# Patient Record
Sex: FEMALE | Race: BLACK OR AFRICAN AMERICAN | NOT HISPANIC OR LATINO | Employment: OTHER | URBAN - METROPOLITAN AREA
[De-identification: names, ages, dates, MRNs, and addresses within clinical notes are randomized per-mention and may not be internally consistent; named-entity substitution may affect disease eponyms.]

---

## 2017-06-28 ENCOUNTER — HOSPITAL ENCOUNTER (OUTPATIENT)
Dept: MEDSURG UNIT | Facility: HOSPITAL | Age: 27
End: 2017-06-30
Attending: INTERNAL MEDICINE | Admitting: INTERNAL MEDICINE

## 2017-06-28 LAB
ABS NEUT (OLG): 13.24 X10(3)/MCL (ref 2.1–9.2)
ALBUMIN SERPL-MCNC: 4.4 GM/DL (ref 3.4–5)
ALBUMIN/GLOB SERPL: 1 RATIO (ref 1–2)
ALP SERPL-CCNC: 66 UNIT/L (ref 45–117)
ALT SERPL-CCNC: 19 UNIT/L (ref 12–78)
AMPHET UR QL SCN: NEGATIVE
APPEARANCE, UA: CLEAR
AST SERPL-CCNC: 23 UNIT/L (ref 15–37)
BACTERIA #/AREA URNS AUTO: ABNORMAL /[HPF]
BARBITURATE SCN PRESENT UR: NEGATIVE
BASOPHILS # BLD AUTO: 0.15 X10(3)/MCL
BASOPHILS NFR BLD AUTO: 1 % (ref 0–1)
BENZODIAZ UR QL SCN: NEGATIVE
BILIRUB SERPL-MCNC: 1.6 MG/DL (ref 0.2–1)
BILIRUB UR QL STRIP: NEGATIVE
BILIRUBIN DIRECT+TOT PNL SERPL-MCNC: 0.3 MG/DL
BILIRUBIN DIRECT+TOT PNL SERPL-MCNC: 1.3 MG/DL
BUN SERPL-MCNC: 8 MG/DL (ref 7–18)
CALCIUM SERPL-MCNC: 9.2 MG/DL (ref 8.5–10.1)
CANNABINOIDS UR QL SCN: NEGATIVE
CHLORIDE SERPL-SCNC: 109 MMOL/L (ref 98–107)
CO2 SERPL-SCNC: 24 MMOL/L (ref 21–32)
COCAINE UR QL SCN: NEGATIVE
COLOR UR: YELLOW
CREAT SERPL-MCNC: 0.7 MG/DL (ref 0.6–1.3)
EOSINOPHIL # BLD AUTO: 0.08 X10(3)/MCL
EOSINOPHIL NFR BLD AUTO: 0 % (ref 0–5)
ERYTHROCYTE [DISTWIDTH] IN BLOOD BY AUTOMATED COUNT: 16.4 % (ref 11.5–14.5)
GLOBULIN SER-MCNC: 4.4 GM/ML (ref 2.3–3.5)
GLUCOSE (UA): NORMAL
GLUCOSE SERPL-MCNC: 118 MG/DL (ref 74–106)
HCT VFR BLD AUTO: 28.2 % (ref 35–46)
HGB BLD-MCNC: 9.3 GM/DL (ref 12–16)
HGB UR QL STRIP: >1 MG/DL
HYALINE CASTS #/AREA URNS LPF: ABNORMAL /[LPF]
IMM GRANULOCYTES # BLD AUTO: 0.06 10*3/UL
IMM GRANULOCYTES NFR BLD AUTO: 0 %
KETONES UR QL STRIP: NEGATIVE
LEUKOCYTE ESTERASE UR QL STRIP: 75 LEU/UL
LYMPHOCYTES # BLD AUTO: 4.67 X10(3)/MCL
LYMPHOCYTES NFR BLD AUTO: 24 % (ref 15–40)
MCH RBC QN AUTO: 29.2 PG (ref 26–34)
MCHC RBC AUTO-ENTMCNC: 33 GM/DL (ref 31–37)
MCV RBC AUTO: 88.4 FL (ref 80–100)
MONOCYTES # BLD AUTO: 1.24 X10(3)/MCL
MONOCYTES NFR BLD AUTO: 6 % (ref 4–12)
NEUTROPHILS # BLD AUTO: 13.24 X10(3)/MCL
NEUTROPHILS NFR BLD AUTO: 68 X10(3)/MCL
NITRITE UR QL STRIP: NEGATIVE
OPIATES UR QL SCN: POSITIVE
PCP UR QL: NEGATIVE
PH UR STRIP: 5.5 [PH] (ref 4.5–8)
PLATELET # BLD AUTO: 627 X10(3)/MCL (ref 130–400)
PMV BLD AUTO: 10.5 FL (ref 7.4–10.4)
POC BETA-HCG (QUAL): NEGATIVE
POTASSIUM SERPL-SCNC: 3.5 MMOL/L (ref 3.5–5.1)
PROT SERPL-MCNC: 8.8 GM/DL (ref 6.4–8.2)
PROT UR QL STRIP: 20 MG/DL
RBC # BLD AUTO: 3.19 X10(6)/MCL (ref 4–5.2)
RBC #/AREA URNS AUTO: ABNORMAL /[HPF]
RET# (OHS): 0.21 X10(6)/MCL (ref 0.02–0.08)
RETICULOCYTE COUNT AUTOMATED (OLG): 6.5 % (ref 0.5–1.5)
SODIUM SERPL-SCNC: 143 MMOL/L (ref 136–145)
SP GR UR STRIP: 1.01 (ref 1–1.03)
SQUAMOUS #/AREA URNS LPF: >100 /[LPF]
UROBILINOGEN UR STRIP-ACNC: NORMAL
WBC # SPEC AUTO: 19.4 X10(3)/MCL (ref 4.5–11)
WBC #/AREA URNS AUTO: ABNORMAL /HPF

## 2017-06-29 LAB
ABS NEUT (OLG): 9.47 X10(3)/MCL
ALBUMIN SERPL-MCNC: 3.8 GM/DL (ref 3.4–5)
ALBUMIN/GLOB SERPL: 1 RATIO (ref 1–2)
ALP SERPL-CCNC: 55 UNIT/L (ref 45–117)
ALT SERPL-CCNC: 16 UNIT/L (ref 12–78)
ANISOCYTOSIS BLD QL SMEAR: NORMAL
AST SERPL-CCNC: 22 UNIT/L (ref 15–37)
BASOPHILS NFR BLD MANUAL: 0 %
BILIRUB SERPL-MCNC: 1.3 MG/DL (ref 0.2–1)
BILIRUBIN DIRECT+TOT PNL SERPL-MCNC: 0.2 MG/DL
BILIRUBIN DIRECT+TOT PNL SERPL-MCNC: 1.1 MG/DL
BUN SERPL-MCNC: 7 MG/DL (ref 7–18)
CALCIUM SERPL-MCNC: 8.8 MG/DL (ref 8.5–10.1)
CHLORIDE SERPL-SCNC: 116 MMOL/L (ref 98–107)
CO2 SERPL-SCNC: 23 MMOL/L (ref 21–32)
CREAT SERPL-MCNC: 0.6 MG/DL (ref 0.6–1.3)
EOSINOPHIL NFR BLD MANUAL: 0 %
ERYTHROCYTE [DISTWIDTH] IN BLOOD BY AUTOMATED COUNT: 16.5 % (ref 11.5–14.5)
GLOBULIN SER-MCNC: 3.7 GM/ML (ref 2.3–3.5)
GLUCOSE SERPL-MCNC: 84 MG/DL (ref 74–106)
GRANULOCYTES NFR BLD MANUAL: 53 %
HCT VFR BLD AUTO: 25.3 % (ref 35–46)
HGB BLD-MCNC: 8.3 GM/DL (ref 12–16)
LYMPHOCYTES NFR BLD MANUAL: 46 %
MCH RBC QN AUTO: 30 PG (ref 26–34)
MCHC RBC AUTO-ENTMCNC: 32.8 GM/DL (ref 31–37)
MCV RBC AUTO: 91.3 FL (ref 80–100)
MONOCYTES NFR BLD MANUAL: 1 %
PLATELET # BLD AUTO: 532 X10(3)/MCL (ref 130–400)
PLATELET # BLD EST: NORMAL 10*3/UL
PMV BLD AUTO: 10.7 FL (ref 7.4–10.4)
POIKILOCYTOSIS BLD QL SMEAR: NORMAL
POLYCHROMASIA BLD QL SMEAR: NORMAL
POTASSIUM SERPL-SCNC: 3.9 MMOL/L (ref 3.5–5.1)
PROT SERPL-MCNC: 7.5 GM/DL (ref 6.4–8.2)
RBC # BLD AUTO: 2.77 X10(6)/MCL (ref 4–5.2)
RBC MORPH BLD: NORMAL
SCHISTOCYTES BLD QL AUTO: NORMAL
SODIUM SERPL-SCNC: 149 MMOL/L (ref 136–145)
TARGETS BLD QL SMEAR: NORMAL
WBC # SPEC AUTO: 16.5 X10(3)/MCL (ref 4.5–11)

## 2017-06-30 LAB
ABS NEUT (OLG): 8.61 X10(3)/MCL (ref 2.1–9.2)
ALBUMIN SERPL-MCNC: 3.8 GM/DL (ref 3.4–5)
ALBUMIN/GLOB SERPL: 1 RATIO (ref 1–2)
ALP SERPL-CCNC: 56 UNIT/L (ref 45–117)
ALT SERPL-CCNC: 16 UNIT/L (ref 12–78)
AST SERPL-CCNC: 21 UNIT/L (ref 15–37)
BASOPHILS # BLD AUTO: 0.12 X10(3)/MCL
BASOPHILS NFR BLD AUTO: 1 % (ref 0–1)
BILIRUB SERPL-MCNC: 1.2 MG/DL (ref 0.2–1)
BILIRUBIN DIRECT+TOT PNL SERPL-MCNC: 0.3 MG/DL
BILIRUBIN DIRECT+TOT PNL SERPL-MCNC: 0.9 MG/DL
BUN SERPL-MCNC: 6 MG/DL (ref 7–18)
CALCIUM SERPL-MCNC: 8.8 MG/DL (ref 8.5–10.1)
CHLORIDE SERPL-SCNC: 113 MMOL/L (ref 98–107)
CO2 SERPL-SCNC: 27 MMOL/L (ref 21–32)
CREAT SERPL-MCNC: 0.6 MG/DL (ref 0.6–1.3)
EOSINOPHIL # BLD AUTO: 0.23 10*3/UL
EOSINOPHIL NFR BLD AUTO: 2 % (ref 0–5)
ERYTHROCYTE [DISTWIDTH] IN BLOOD BY AUTOMATED COUNT: 16.2 % (ref 11.5–14.5)
FINAL CULTURE: NO GROWTH
FINAL CULTURE: NORMAL
GLOBULIN SER-MCNC: 3.6 GM/ML (ref 2.3–3.5)
GLUCOSE SERPL-MCNC: 84 MG/DL (ref 74–106)
HCT VFR BLD AUTO: 23.7 % (ref 35–46)
HGB BLD-MCNC: 8 GM/DL (ref 12–16)
IMM GRANULOCYTES # BLD AUTO: 0.05 10*3/UL
IMM GRANULOCYTES NFR BLD AUTO: 0 %
LYMPHOCYTES # BLD AUTO: 4.74 X10(3)/MCL
LYMPHOCYTES NFR BLD AUTO: 32 % (ref 15–40)
MCH RBC QN AUTO: 30.2 PG (ref 26–34)
MCHC RBC AUTO-ENTMCNC: 33.8 GM/DL (ref 31–37)
MCV RBC AUTO: 89.4 FL (ref 80–100)
MONOCYTES # BLD AUTO: 1.07 X10(3)/MCL
MONOCYTES NFR BLD AUTO: 7 % (ref 4–12)
NEUTROPHILS # BLD AUTO: 8.61 X10(3)/MCL
NEUTROPHILS NFR BLD AUTO: 58 X10(3)/MCL
PLATELET # BLD AUTO: 519 X10(3)/MCL (ref 130–400)
PMV BLD AUTO: 10.4 FL (ref 7.4–10.4)
POTASSIUM SERPL-SCNC: 3.7 MMOL/L (ref 3.5–5.1)
PROT SERPL-MCNC: 7.4 GM/DL (ref 6.4–8.2)
RAPID GROUP A STREP (OHS): NORMAL
RBC # BLD AUTO: 2.65 X10(6)/MCL (ref 4–5.2)
SODIUM SERPL-SCNC: 150 MMOL/L (ref 136–145)
WBC # SPEC AUTO: 14.8 X10(3)/MCL (ref 4.5–11)

## 2017-07-04 LAB
FINAL CULTURE: NORMAL
FINAL CULTURE: NORMAL

## 2017-08-29 ENCOUNTER — TELEPHONE (OUTPATIENT)
Dept: HEMATOLOGY/ONCOLOGY | Facility: CLINIC | Age: 27
End: 2017-08-29

## 2017-09-18 ENCOUNTER — TELEPHONE (OUTPATIENT)
Dept: HEMATOLOGY/ONCOLOGY | Facility: CLINIC | Age: 27
End: 2017-09-18

## 2017-09-18 NOTE — TELEPHONE ENCOUNTER
Spoke with patient.  She prefers to be seen in [place] or facility closer to her.  She was unable to keep last years apptmnt with Dr Izquierdo as she was in hospital.  Will forward to Dr Izquierdo's office    ----- Message from Day Paul sent at 9/18/2017  9:17 AM CDT -----  Contact: Self  Pt is calling to speak with Staff regarding her appt.  Pt says she was told she needed a referral and her doctor sent one and wants to know when her appt will be?    She can be reached at [phone].    Thank you.

## 2017-09-22 ENCOUNTER — TELEPHONE (OUTPATIENT)
Dept: HEMATOLOGY/ONCOLOGY | Facility: CLINIC | Age: 27
End: 2017-09-22

## 2017-09-22 NOTE — TELEPHONE ENCOUNTER
Explained to patient that Dr Bhagat works with other cancer patients but we can schedule her with hematologists here.  She does not want to drive 2 hours to come here.  Requested help in getting scheduled in [place].   Forwarded to [facility] facility.  ER records already in Media.  Pt has not seen hematologist in 6 mos  - she goes to ER when she has sickle cell crisis.    =====================================  ----- Message from Tracie Mitchell sent at 9/22/2017  1:33 PM CDT -----  Contact: pt   Pt contact [phone]    New pt said she was told to call back in a week if she hasn't heard anything about an appt to see Dr Bhagat    Pt wants an update about her appt    Please update pt

## 2019-09-08 PROBLEM — D57.1 SICKLE CELL DISEASE: Status: ACTIVE | Noted: 2019-09-08

## 2019-09-08 PROBLEM — E83.111 IRON OVERLOAD DUE TO REPEATED RED BLOOD CELL TRANSFUSIONS: Status: ACTIVE | Noted: 2019-09-08

## 2021-05-12 ENCOUNTER — PATIENT MESSAGE (OUTPATIENT)
Dept: RESEARCH | Facility: HOSPITAL | Age: 31
End: 2021-05-12

## 2022-04-11 ENCOUNTER — HISTORICAL (OUTPATIENT)
Dept: ADMINISTRATIVE | Facility: HOSPITAL | Age: 32
End: 2022-04-11
Payer: MEDICAID

## 2022-04-29 VITALS
DIASTOLIC BLOOD PRESSURE: 98 MMHG | HEIGHT: 67 IN | BODY MASS INDEX: 26.26 KG/M2 | WEIGHT: 167.31 LBS | SYSTOLIC BLOOD PRESSURE: 162 MMHG

## 2022-04-30 NOTE — H&P
"   Patient:   Anya Leal             MRN: [id number]            FIN: [id number]               Age:   27 years     Sex:  Female     :  1990   Associated Diagnoses:   None   Author:   Cindi Roman DO      Basic Information   Source of history:  Self.    Referral source:  Emergency department.    History limitation:  None.       Chief Complaint   2017 18:18 CDT      HX of sickle cell anemia. Reports Bilat knee, shoulder and back pain x 1 week. States " I dont have a PCP and I don't have any pain medicine." Pt also c/o sore throat x 2 days.        History of Present Illness   Patient is a 27-year-old -American female who presented to the ED today with complaints of pain in her back, bilateral arms, and bilateral legs.  Patient has a past medical history of sickle cell disease.  Patient states that her leg and back pain is always present, however over the last week, the pain has worsened and she has also developed bilateral arm pain.  She is also complaining of sore throat, tender left submandibular lymphadenopathy, and subjective fevers for the past week.  She denies any cough, chest pain, shortness of breath, dysuria, abdominal pain, weakness, lightheadedness, or syncope.  The patient has been transfused multiple times as a child and also in her adult life.  She could not recall the approximate date of the last transfusion.  Past medical history of sickle cell disease  Family history: None documented  Allergies: Aspirin, dilaudid, morphine, Nubain, Stadol, Toradol, tramadol  Medications: Folic acid 1 mg daily and occasionally Percocet 10/325.  Surgical history: Tubal ligation in .  MediPort insertion.  Social history: Denies tobacco, alcohol, and drug use.  Lives with her mother.  Has 2 children, neither has sickle cell disease.      Review of Systems   Constitutional:  Fever, No chills, No sweats, No weakness, No fatigue, No loss of appetite, No weight gain, No weight loss.  "   Eye:  No visual disturbances.    Ear/Nose/Mouth/Throat:  Sore throat, tender LAD, No decreased hearing, No nasal congestion.    Respiratory:  No shortness of breath, No cough, No sputum production, No hemoptysis, No wheezing.    Cardiovascular:  No chest pain, No palpitations, No peripheral edema, No syncope.    Gastrointestinal:  No nausea, No vomiting, No diarrhea, No constipation, No heartburn, No abdominal pain.    Genitourinary:  No dysuria, No hematuria.    Hematology/Lymphatics:  No bruising tendency.    Musculoskeletal:  Muscle pain, B/L thigh pain and B/L arm pain, No decreased range of motion, No joint stiffness, No joint swelling, No trauma.         Back pain: Bilaterally, In the lower region, The pain is severe, Not radiating.    Integumentary:  No rash, No pruritus, No breakdown, No skin lesion.    Neurologic:  Alert and oriented X4, No abnormal balance, No confusion, No numbness, No tingling.    Psychiatric:  No anxiety, No depression.       Physical Examination      Vital Signs (last 24 hrs)_____  Last Charted___________  Temp Oral     37.2 DegC  (JUN 28 23:21)  Heart Rate Peripheral   98 bpm  (JUN 28 23:21)  Resp Rate         17 br/min  (JUN 28 23:21)  SBP      H 145mmHg  (JUN 28 23:21)  DBP      85 mmHg  (JUN 28 23:21)  SpO2      99 %  (JUN 28 23:21)  Weight      73.9 kg  (JUN 28 23:21)  Height      170.18 cm  (JUN 28 22:00)     General:  Alert and oriented, No acute distress.    Eye:  Pupils are equal, round and reactive to light, Extraocular movements are intact, Normal conjunctiva.    HENT:  Normocephalic, Normal hearing.    Neck:  Supple, Non-tender, No carotid bruit, No jugular venous distention, No lymphadenopathy, No thyromegaly.    Respiratory:  Respirations are non-labored, Breath sounds are equal, Symmetrical chest wall expansion, No chest wall tenderness, decreased lung sounds LLL. CTA otherwise., trach in place.    Cardiovascular:  Regular rhythm, No murmur, No gallop, Good pulses  equal in all extremities, Normal peripheral perfusion, No edema, tachycardic.    Gastrointestinal:  Soft, Non-tender, Non-distended, Normal bowel sounds, No organomegaly.    Genitourinary:  No costovertebral angle tenderness.    Musculoskeletal:  Normal range of motion, Normal strength, No tenderness, No swelling, No deformity.    Integumentary:  Warm, Dry, Intact.    Neurologic:  Alert, Oriented, Normal sensory, Normal motor function, No focal deficits, Cranial Nerves II-XII are grossly intact.    Cognition and Speech:  Oriented, Speech clear and coherent.    Psychiatric:  Cooperative, Appropriate mood & affect.       Review / Management   Laboratory Results   Today's Lab Results : PowerNote Discrete Results   6/28/2017 18:52 CDT      WBC                       19.4 x10(3)/mcL  HI                             RBC                       3.19 x10(6)/mcL  LOW                             Hgb                       9.3 gm/dL  LOW                             Hct                       28.2 %  LOW                             Platelet                  627 x10(3)/mcL  HI                             MCV                       88.4 fL                             MCH                       29.2 pg                             MCHC                      33.0 gm/dL                             RDW                       16.4 %  HI                             MPV                       10.5 fL  HI                             Abs Neut                  13.24 x10(3)/mcL  HI                             Neutro Auto               68 x10(3)/mcL  NA                             Lymph Auto                24 %                             Mono Auto                 6 %                             Eos Auto                  0 %                             Abs Eos                   0.08 x10(3)/mcL  NA                             Basophil Auto             1 %                             Abs Neutro                13.24 x10(3)/mcL  NA                              Abs Lymph                 4.67 x10(3)/mcL  NA                             Abs Mono                  1.24 x10(3)/mcL  NA                             Abs Baso                  0.15 x10(3)/mcL  NA                             IG%                       0 %  NA                             IG#                       0.0600  NA                             Retic Cnt Auto            6.5 %  HI                             RET#                      0.21 x10(6)/mcL  HI                             Sodium Lvl                143 mmol/L                             Potassium Lvl             3.5 mmol/L                             Chloride                  109 mmol/L  HI                             CO2                       24 mmol/L                             Calcium Lvl               9.2 mg/dL                             Glucose Lvl               118 mg/dL  HI                             BUN                       8 mg/dL                             Creatinine                0.70 mg/dL                             eGFR-AA                   >105 mL/min                             eGFR-MILADY                  >105 mL/min                             Bili Total                1.6 mg/dL  HI                             Bili Direct               0.3 mg/dL                             Bili Indirect             1.3 mg/dL  HI                             AST                       23 unit/L                             ALT                       19 unit/L                             Alk Phos                  66 unit/L                             Total Protein             8.8 gm/dL  HI                             Albumin Lvl               4.4 gm/dL                             Globulin                  4.40 gm/mL  HI                             A/G Ratio                 1 ratio                             Rapid Strep Test          Review  (In Progress)   6/28/2017 18:51 CDT      UA Appear                 Clear                             UA Color                   YELLOW                             UA Spec Grav              1.012                             UA Bili                   Negative                             UA pH                     5.5                             UA Urobilinogen           Normal                             UA Blood                  >1.0 mg/dL  (Modified)                            UA Glucose                Normal                             UA Ketones                Negative                             UA Protein                20 mg/dL                             UA Nitrite                Negative                             UA Leuk Est               75 Dominick/uL                             UA WBC Interp             6-10 /HPF                             UA RBC Interp             3-5                             UA Bact Interp            Many                             UA Squam Epi Interp       >100                             UA Hyal Cast Interp       3-5                             U Amph Scr                Negative                             U Rakel Scr                Negative                             U Benzodia Scr            Negative                             U Cannab Scr              Negative                             U Cocaine Scr             Negative                             U Opiate Scr              Positive                             U Phencyclidine Scr       Negative        Radiology:  Chest x-ray:  Official read pending.  Personal interpretation: Possible right middle lobe infiltrate.      Impression and Plan   Sickle cell pain crisis  -Elevated reticulocyte count, mildly elevated indirect bilirubin.  -PRN norco, Demerol, Benadryl, Zofran and Phenergan, and nasal canula.  -Started patient on hydroxyurea 1000 mg daily and folic acid 1 mg.  -Chest x-ray possible right middle lobe infiltrates.  Monitor for signs of acute chest syndrome.  SPO2 100% on room air.  -H&H stable.  Continue to monitor    Tonsillitis with  exudate and 2/4 SIRS  -Sore throat with exudates and tender lymphadenopathy.    -Tachycardic and leukocytosis-possibly secondary to pain, cannot rule out infection  - Rapid strep negative, follow-up culture. UA revealed leukocyte esterase and WBCs, however was not a clean catch.  Follow-up repeat UA and urine culture.  -Chest x-ray official read pending, however possible right middle lobe infiltrate.  Patient asymptomatic.  -Started on IV fluids and received 1 g Rocephin in the ED.  -Follow-up a.m. labs and cultures.  Evaluate need to continue antibiotics.    Elevated blood pressure  -Likely secondary to pain  -PRN labetalol  -Continue to monitor    Ppx: VTE-SCDs, GI- protonix  Disposition: Patient admitted to medical unit for observation and treatment of acute pain crisis.  Follow-up cultures and monitor vitals.  Currently afebrile and satting 100% on room air.  Follow-up official read chest x-ray.  Evaluate the need to continue antibiotics in a.m.  Received 1 g of Rocephin in the ED.

## 2022-04-30 NOTE — ED PROVIDER NOTES
"   Patient:   Anya Leal             MRN: [id number]            FIN: [id number]               Age:   27 years     Sex:  Female     :  1990   Associated Diagnoses:   Acute sickle cell crisis   Author:   Santhosh Hennessy MD      Basic Information   Time seen: Date 2017, Immediately upon arrival.   History source: Patient.   Arrival mode: Private vehicle.   History limitation: None.   Additional information: Chief Complaint from Nursing Triage Note : Chief Complaint   2017 18:18 CDT      Chief Complaint           HX of sickle cell anemia. Reports Bilat knee, shoulder and back pain x 1 week. States " I dont have a PCP and I don't have any pain medicine." Pt also c/o sore throat x 2 days.  .      History of Present Illness   The patient presents with Hx of sickle cell anemia. noticed worsening of the pain past few days. also sore throat past 2 days.  The onset was 2  days ago.  The course/duration of symptoms is worsening.  Location: Generalized back upper extremity lower extremity. The character of symptoms is sharp.  The degree of pain is moderate.  The exacerbating factor is exertion.  The relieving factor is rest.  Risk factors consist of Sickle cell.  Prior episodes: similar to "sickle cell pain".  Therapy today: none.  Associated symptoms: none.        Review of Systems   Constitutional symptoms:  Fever, chills, no weakness, no fatigue, no decreased activity.    Skin symptoms:  No jaundice, no rash, no pruritus.    Eye symptoms:  No recent vision problems,    ENMT symptoms:  Sore throat, No nasal congestion,    Respiratory symptoms:  No orthopnea, no sputum production.    Cardiovascular symptoms:  No chest pain, no palpitations, no syncope, no diaphoresis.    Gastrointestinal symptoms:  No abdominal pain, no nausea, no vomiting, no diarrhea.    Genitourinary symptoms:  No dysuria,    Musculoskeletal symptoms:  Back pain, Muscle pain, Joint pain.    Neurologic symptoms:  No headache, no " dizziness, no altered level of consciousness.    Psychiatric symptoms:  No anxiety, no depression, no sleeping problems, no substance abuse.    Endocrine symptoms:  No polyuria, no polydipsia, no polyphagia, no hyperglycemia.    Hematologic/Lymphatic symptoms:  Bleeding tendency negative, bruising tendency negative, no petechiae, no gum bleeding.    Allergy/immunologic symptoms:  No food allergies, no recurrent infections, no impaired immunity.              Additional review of systems information: All other systems reviewed and otherwise negative.      Health Status   Allergies:    Allergic Reactions (Selected)  Severity Not Documented  Aspirin- No reactions were documented.  Dilaudid- No reactions were documented.  Morphine- No reactions were documented.  Nubain- No reactions were documented.  Stadol- No reactions were documented.  Toradol- No reactions were documented.  TraMADol- No reactions were documented.,    Allergies (7) Active Reaction  aspirin None Documented  Dilaudid None Documented  morphine None Documented  Nubain None Documented  Stadol None Documented  Toradol None Documented  traMADol None Documented  .   Medications:  (Selected)   Prescriptions  Prescribed  folic acid 1 mg oral tablet: 1 mg = 1 tab(s), Oral, Daily, # 30 tab(s), 3 Refill(s)  ibuprofen 600 mg oral tablet: 600 mg = 1 tab(s), Oral, q6hr, PRN as needed for pain, # 40 tab(s), 0 Refill(s), per nurse's notes.      Past Medical/ Family/ Social History   Medical history:    Resolved  Sickle cell ([id number]): Onset in 1993 at 3 years.  Resolved., Reviewed as documented in chart.   Surgical history:    Tubal ligation (SNOMED CT [id number]) on 4/26/2014 at 24 Years., Reviewed as documented in chart.   Family history:    No family history items have been selected or recorded., Reviewed as documented in chart.   Social history:    Social & Psychosocial Habits    Alcohol  07/07/2015  Use: Never    Substance Abuse  07/07/2015  Use:  Never    Tobacco  04/25/2015 Risk Assessment: Denies Tobacco Use    07/07/2015  Use: Never smoker  , Reviewed as documented in chart.   Problem list:    Active Problems (1)  Sickle cell   .      Physical Examination               Vital Signs             Time:  6/28/2017 18:53:00.   Vital Signs   6/28/2017 18:14 CDT      Temperature Oral          37.2 DegC                             Heart Rate Monitored      144 bpm  HI                             Respiratory Rate          18 br/min                             SpO2                      100 %                             Systolic Blood Pressure   132 mmHg                             Diastolic Blood Pressure  112 mmHg  HI  .      Vital Signs (last 24 hrs)_____  Last Charted___________  Temp Oral     37.2 DegC  (JUN 28 18:14)  Resp Rate         18 br/min  (JUN 28 18:14)  SBP      132 mmHg  (JUN 28 18:14)  DBP      H 112mmHg  (JUN 28 18:14)  SpO2      100 %  (JUN 28 18:14)  .   Basic Oxygen Information   6/28/2017 18:14 CDT      SpO2                      100 %  .   General:  Alert, no acute distress.    Skin:  Warm, pink, intact, moist, no pallor, no rash, normal for ethnicity.    Head:  Normocephalic, atraumatic.    Neck:  Supple, trachea midline, no tenderness, no JVD, no carotid bruit.    Eye:  Pupils are equal, round and reactive to light, extraocular movements are intact, normal conjunctiva.    Ears, nose, mouth and throat:  Tympanic membranes clear, oral mucosa moist, bilateral tonsilar exudates.    Cardiovascular:  No murmur, Normal peripheral perfusion, No edema, Tachycardia with regular rhythm.    Respiratory:  Lungs are clear to auscultation, respirations are non-labored, breath sounds are equal, Symmetrical chest wall expansion.    Chest wall:  No tenderness, No deformity.    Back:  Nontender, Normal range of motion, Normal alignment, no step-offs.    Musculoskeletal:  Normal ROM, normal strength, no tenderness, no swelling.    Gastrointestinal:  Soft,  Nontender, Non distended, Normal bowel sounds, No organomegaly.    Genitourinary   Neurological:  Alert and oriented to person, place, time, and situation, No focal neurological deficit observed, CN II-XII intact, normal sensory observed, normal motor observed.    Lymphatics:  No lymphadenopathy.   Psychiatric:  Cooperative, appropriate mood & affect, normal judgment, non-suicidal.       Medical Decision Making   Electrocardiogram:  Time 6/28/2017 18:54:00, rate 137, EP Interp, Sinus tachycardia with LVH and LAE.    Results review:     No qualifying data available.      Reexamination/ Reevaluation   Vital signs   Basic Oxygen Information   6/28/2017 18:14 CDT      SpO2                      100 %        Impression and Plan   Diagnosis   Acute sickle cell crisis (DYL64-FG D57.00)   Plan   Disposition: Patient care transitioned to: Time: 6/28/2017 19:00:00, Millie FAIRCHILD, Asma.

## 2022-04-30 NOTE — ED PROVIDER NOTES
"   Patient:   Anya Leal             MRN: [id number]            FIN: [id number]               Age:   27 years     Sex:  Female     :  1990   Associated Diagnoses:   Acute sickle cell crisis; Tonsillitis with exudate   Author:   Vance Pinto MD      Basic Information   Time seen: Date 2017, Immediately upon arrival.   History source: Patient.   Arrival mode: Private vehicle.   History limitation: None.   Additional information.   History of Present Illness   The patient presents with Hx of sickle cell anemia. noticed worsening of the pain past few days. also sore throat past 2 days.  The onset was 2  days ago.  The course/duration of symptoms is worsening.  Location: Generalized back upper extremity lower extremity. The character of symptoms is sharp.  The degree of pain is moderate.  The exacerbating factor is exertion.  The relieving factor is rest.  Risk factors consist of Sickle cell.  Prior episodes: similar to "sickle cell pain".  Therapy today: none.  Associated symptoms: none.        Review of Systems   Constitutional symptoms:  Fever, chills, no weakness, no fatigue, no decreased activity.    Skin symptoms:  No jaundice, no rash, no pruritus.    Eye symptoms:  No recent vision problems,    ENMT symptoms:  Sore throat, No nasal congestion,    Respiratory symptoms:  No orthopnea, no sputum production.    Cardiovascular symptoms:  No chest pain, no palpitations, no syncope, no diaphoresis.    Gastrointestinal symptoms:  No abdominal pain, no nausea, no vomiting, no diarrhea.    Genitourinary symptoms:  No dysuria,    Musculoskeletal symptoms:  Back pain, Muscle pain, Joint pain.    Neurologic symptoms:  No headache, no dizziness, no altered level of consciousness.    Psychiatric symptoms:  No anxiety, no depression, no sleeping problems, no substance abuse.    Endocrine symptoms:  No polyuria, no polydipsia, no polyphagia, no hyperglycemia.    Hematologic/Lymphatic symptoms:  Bleeding " tendency negative, bruising tendency negative, no petechiae, no gum bleeding.    Allergy/immunologic symptoms:  No food allergies, no recurrent infections, no impaired immunity.              Additional review of systems information: All other systems reviewed and otherwise negative.      Health Status   Allergies:    Allergic Reactions (Selected)  Severity Not Documented  Aspirin- No reactions were documented.  Dilaudid- No reactions were documented.  Morphine- No reactions were documented.  Nubain- No reactions were documented.  Stadol- No reactions were documented.  Toradol- No reactions were documented.  TraMADol- No reactions were documented.,    Allergies (7) Active Reaction  aspirin None Documented  Dilaudid None Documented  morphine None Documented  Nubain None Documented  Stadol None Documented  Toradol None Documented  traMADol None Documented  .   Medications:  (Selected)   Inpatient Medications  Ordered  NS 1,000 mL: 1,000 mL, 1,000 mL, IV, 1,000 mL/hr, start date 06/28/17 19:17:00 CDT  Prescriptions  Prescribed  folic acid 1 mg oral tablet: 1 mg = 1 tab(s), Oral, Daily, # 30 tab(s), 3 Refill(s)  ibuprofen 600 mg oral tablet: 600 mg = 1 tab(s), Oral, q6hr, PRN as needed for pain, # 40 tab(s), 0 Refill(s), per nurse's notes.      Past Medical/ Family/ Social History   Medical history:    Resolved  Sickle cell ([id number]): Onset in 1993 at 3 years.  Resolved., Reviewed as documented in chart.   Surgical history:    Tubal ligation ([id number]) on 4/26/2014 at 24 Years., Reviewed as documented in chart.   Family history:    No family history items have been selected or recorded., Reviewed as documented in chart.   Social history:    Social & Psychosocial Habits    Alcohol  07/07/2015  Use: Never    Substance Abuse  07/07/2015  Use: Never    Tobacco  04/25/2015 Risk Assessment: Denies Tobacco Use    07/07/2015  Use: Never smoker  , Reviewed as documented in chart.   Problem list:    Active Problems (1)  Sickle  cell   .      Physical Examination               Vital Signs             Time:  6/28/2017 18:53:00.      Vital Signs (last 24 hrs)_____  Last Charted___________  Temp Oral     37.2 DegC  (JUN 28 18:14)  Heart Rate Peripheral   H 144bpm  (JUN 28 18:53)  Resp Rate         18 br/min  (JUN 28 18:14)  SBP      132 mmHg  (JUN 28 18:14)  DBP      H 112mmHg  (JUN 28 18:14)  SpO2      100 %  (JUN 28 18:53)  .   Oxygen saturation.   General:  Alert, no acute distress.    Skin:  Warm, pink, intact, moist, no pallor, no rash, normal for ethnicity.    Head:  Normocephalic, atraumatic.    Neck:  Supple, trachea midline, no tenderness, no JVD, no carotid bruit.    Eye:  Pupils are equal, round and reactive to light, extraocular movements are intact, normal conjunctiva.    Ears, nose, mouth and throat:  Tympanic membranes clear, oral mucosa moist, Throat: Left, moderate, tonsil, erythema, with exudate, swelling, Throat: Right, moderate, tonsil, erythema, with exudate.    Cardiovascular:  No murmur, Normal peripheral perfusion, No edema, Tachycardia with regular rhythm.    Respiratory:  Lungs are clear to auscultation, respirations are non-labored, breath sounds are equal, Symmetrical chest wall expansion.    Chest wall:  No tenderness, No deformity.    Back:  Nontender, Normal range of motion, Normal alignment, no step-offs.    Musculoskeletal:  Normal ROM, normal strength, no tenderness, no swelling.    Gastrointestinal:  Soft, Nontender, Non distended, Normal bowel sounds, No organomegaly.    Genitourinary   Neurological:  Alert and oriented to person, place, time, and situation, No focal neurological deficit observed, CN II-XII intact, normal sensory observed, normal motor observed.    Lymphatics:  No lymphadenopathy.   Psychiatric:  Cooperative, appropriate mood & affect, normal judgment, non-suicidal.       Medical Decision Making   Documents reviewed:  Emergency department nurses' notes, emergency department records, prior  records.    Electrocardiogram:  Time 6/28/2017 18:54:00, rate 137, EP Interp, Sinus tachycardia with LVH and LAE.    Results review:  Lab results : Lab View   6/28/2017 19:05 CDT      U beta hCG Ql POC         Negative    6/28/2017 18:52 CDT      Sodium Lvl                143 mmol/L                             Potassium Lvl             3.5 mmol/L                             Chloride                  109 mmol/L  HI                             CO2                       24 mmol/L                             Calcium Lvl               9.2 mg/dL                             Glucose Lvl               118 mg/dL  HI                             BUN                       8 mg/dL                             Creatinine                0.70 mg/dL                             eGFR-AA                   >105 mL/min                             eGFR-MILADY                  >105 mL/min                             Bili Total                1.6 mg/dL  HI                             Bili Direct               0.3 mg/dL                             Bili Indirect             1.3 mg/dL  HI                             AST                       23 unit/L                             ALT                       19 unit/L                             Alk Phos                  66 unit/L                             Total Protein             8.8 gm/dL  HI                             Albumin Lvl               4.4 gm/dL                             Globulin                  4.40 gm/mL  HI                             A/G Ratio                 1 ratio                             WBC                       19.4 x10(3)/mcL  HI                             RBC                       3.19 x10(6)/mcL  LOW                             Hgb                       9.3 gm/dL  LOW                             Hct                       28.2 %  LOW                             Platelet                  627 x10(3)/mcL  HI                             MCV                       88.4 fL                              MCH                       29.2 pg                             MCHC                      33.0 gm/dL                             RDW                       16.4 %  HI                             MPV                       10.5 fL  HI                             Abs Neut                  13.24 x10(3)/mcL  HI                             Neutro Auto               68 x10(3)/mcL  NA                             Lymph Auto                24 %                             Mono Auto                 6 %                             Eos Auto                  0 %                             Abs Eos                   0.08 x10(3)/mcL  NA                             Basophil Auto             1 %                             Abs Neutro                13.24 x10(3)/mcL  NA                             Abs Lymph                 4.67 x10(3)/mcL  NA                             Abs Mono                  1.24 x10(3)/mcL  NA                             Abs Baso                  0.15 x10(3)/mcL  NA                             IG%                       0 %  NA                             IG#                       0.0600  NA                             Retic Cnt Auto            6.5 %  HI                             RET#                      0.21 x10(6)/mcL  HI                             Rapid Strep Test          Review  (In Progress)   6/28/2017 18:51 CDT      UA Appear                 Clear                             UA Color                  YELLOW                             UA Spec Grav              1.012                             UA Bili                   Negative                             UA pH                     5.5                             UA Urobilinogen           Normal                             UA Blood                  >1.0 mg/dL  (Modified)                            UA Glucose                Normal                             UA Ketones                Negative                             UA Protein                 20 mg/dL                             UA Nitrite                Negative                             UA Leuk Est               75 Dominick/uL                             UA WBC Interp             6-10 /HPF                             UA RBC Interp             3-5                             UA Bact Interp            Many                             UA Squam Epi Interp       >100                             UA Hyal Cast Interp       3-5                             U Amph Scr                Negative                             U Rakel Scr                Negative                             U Benzodia Scr            Negative                             U Cannab Scr              Negative                             U Cocaine Scr             Negative                             U Opiate Scr              Positive                             U Phencyclidine Scr       Negative    .   Chest X-Ray:  No acute disease process, interpretation by Emergency Physician.       Reexamination/ Reevaluation   Time: 6/28/2017 20:00:00 .   Vital signs   results included from flowsheet : Vital Signs   6/28/2017 20:40 CDT      Peripheral Pulse Rate     99 bpm    6/28/2017 19:55 CDT      Peripheral Pulse Rate     108 bpm  HI    6/28/2017 19:29 CDT      Peripheral Pulse Rate     141 bpm  HI                             SpO2                      100 %                             Oxygen Therapy            Room air                             Systolic Blood Pressure   149 mmHg  HI                             Diastolic Blood Pressure  114 mmHg  HI    6/28/2017 18:53 CDT      Peripheral Pulse Rate     144 bpm  HI                             SpO2                      100 %                             Oxygen Therapy            Room air    6/28/2017 18:14 CDT      Temperature Oral          37.2 DegC                             Heart Rate Monitored      144 bpm  HI                             Respiratory Rate          18 br/min                              SpO2                      100 %                             Systolic Blood Pressure   132 mmHg                             Diastolic Blood Pressure  112 mmHg  HI     Course: progressing as expected.   Pain status: unchanged.   Assessment: exam unchanged, Pt NAD, VSS, pt not ill or toxic appearing, pt with no acute abdomen, no neuro defecits, no active CP or SOB. Pt with tonsillitis and in pain crisis. will consult medicine for admission. Pt recevied 2 L NS, Rocephin 1 gm given in ER. .      Impression and Plan   Diagnosis   Acute sickle cell crisis (HCN66-XY D57.00)   Tonsillitis with exudate (BCW34-QY J03.90)      Calls-Consults   -  6/28/2017 20:14:00 , Internal Medicine, recommends Spoke with Dr. Umaña, will come and see patient in ER..    Plan   Condition: Stable.    Disposition: Admit time  6/28/2017 20:19:00, Place in Observation Unit.    Counseled: Patient, Regarding diagnosis, Regarding diagnostic results, Regarding treatment plan, Patient indicated understanding of instructions.    Notes:   2014: Discussed case with Dr. Eric regarding patient. Dr. Eric reports that he will be there shortly.   2140: Med team has not seen patient yet   2142: Paged Med team   2143: Dr. Roman reports she is on hre way to see patient.  .

## 2022-04-30 NOTE — DISCHARGE SUMMARY
Patient:   Anya Leal             MRN: [id number]            FIN: [id number]               Age:   27 years     Sex:  Female     :  1990   Associated Diagnoses:   None   Author:   Curtis Umaña MD        Admission Date: 2017  Discharge Date: 2017  Admit Diagnosis: Sickle cell pain crisis, indirect hyperbilirubinemia, and normochromic normocytic anemia  Discharge Diagnosis: Sickle cell pain crisis, indirect hyperbilirubinemia, and normochromic normocytic anemia      Resident: Curtis Umaña MD; Cindi Roman DO  Attending: Tanner Garrison MD  Referring Physician: ED  Consults: None   Procedures: None    HPI:  Patient is a 27-year-old -American female who presented to the ED today with complaints of pain in her back, bilateral arms, and bilateral legs.  Patient has a past medical history of sickle cell disease.  Patient states that her leg and back pain is always present, however over the last week, the pain has worsened and she has also developed bilateral arm pain.  She is also complaining of sore throat, tender left submandibular lymphadenopathy, and subjective fevers for the past week.  She denies any cough, chest pain, shortness of breath, dysuria, abdominal pain, weakness, lightheadedness, or syncope.  The patient has been transfused multiple times as a child and also in her adult life.  She could not recall the approximate date of the last transfusion.         Hospital Course   Patient was admitted to the hospital on 2017 for a sickle cell pain crisis.  Patient was initially initiated on Demerol 50 mg every 6 hours and Norco 5 mg for breakthrough pain secondary to multiple patient allergies.  The following day patient reported IV Demerol was not helping with her pain.  The patient was subsequently switched to long-acting formulary of MS Contin in which patient refused secondary to patient being allergic to morphine she reports.  Patient then requested IV Dilaudid but reports  she also has an allergy to that as well.  Patient initially stated that she had not been hospitalized for over a year for a sickle cell crisis.  Later stated that she was recently hospitalized in [place] approximately 2 weeks prior to presentation and was given IV Dilaudid which helped with her pain.  Patient not taking her folic acid and hydroxyurea.  Patient was transitioned to oxycodone 10 mg every 12 hours and release and Percocet for breakthrough pain.  Blood culture no growth ×1 day.  Urine culture no growth ×12 hours.  Indirect hyperbilirubinemia improved.  H&H remained stable.  On the day of discharge patient's hemoglobin was stable and tolerating by mouth with no nausea or vomiting.  Patient was discharged home on by mouth Percocet, hydroxyurea, and folic acid.      Physical Examination      Vital Signs (last 24 hrs)_____  Last Charted___________  Temp Oral     36.6 DegC  (JUN 30 04:00)  Heart Rate Peripheral   76 bpm  (JUN 30 04:00)  Resp Rate         17 br/min  (JUN 30 04:00)  SBP      H 151mmHg  (JUN 30 04:00)  DBP      90 mmHg  (JUN 30 04:00)  SpO2      98 %  (JUN 30 08:00)       General:  Alert and oriented, No acute distress.    Eye:  Pupils are equal, round and reactive to light, Extraocular movements are intact, Normal conjunctiva.    HENT:  Normocephalic, Normal hearing.    Neck:  Supple, Non-tender, No carotid bruit, No jugular venous distention, No lymphadenopathy, No thyromegaly.    Respiratory:  Respirations are non-labored, Breath sounds are equal, Symmetrical chest wall expansion, No chest wall tenderness, decreased lung sounds LLL. CTA otherwise., trach in place.    Cardiovascular:  Regular rhythm, No murmur, No gallop, Good pulses equal in all extremities, Normal peripheral perfusion, No edema, tachycardic.    Gastrointestinal:  Soft, Non-tender, Non-distended, Normal bowel sounds, No organomegaly.    Genitourinary:  No costovertebral angle tenderness.    Musculoskeletal:  Normal range  of motion, Normal strength, No tenderness, No swelling, No deformity.    Integumentary:  Warm, Dry, Intact.    Neurologic:  Alert, Oriented, Normal sensory, Normal motor function, No focal deficits, Cranial Nerves II-XII are grossly intact.    Cognition and Speech:  Oriented, Speech clear and coherent.    Psychiatric:  Cooperative, Appropriate mood & affect.           Discharge Plan   Discharge Summary Plan   Discharge Status: improved.     Discharge instructions given: to patient.     Discharge disposition: discharge to home, _.     Prescriptions: _, See MAR.                 Instructions: Keep all appointments as scheduled.  Take all medications as prescribed.  Return to emergency department if symptoms recur or for any other concerns. 15 minutes spent education on disease process, medications and future follow-up.  All questions answered to patient's satisfaction.  Patient provided with written education materials on current condition.       Follow-Up: Post barron follow up in 2 week with Dr. Umaña.

## 2022-06-10 DIAGNOSIS — D57.00 HB-SS DISEASE WITH CRISIS: ICD-10-CM

## 2022-06-10 DIAGNOSIS — D57.00 SICKLE CELL PAIN CRISIS: Primary | ICD-10-CM

## 2022-06-10 RX ORDER — HYDROXYUREA 500 MG/1
2000 CAPSULE ORAL DAILY
Qty: 120 CAPSULE | Refills: 1 | Status: SHIPPED | OUTPATIENT
Start: 2022-06-10 | End: 2023-01-12 | Stop reason: SDUPTHER

## 2022-06-10 RX ORDER — FOLIC ACID 1 MG/1
1 TABLET ORAL DAILY
Qty: 90 TABLET | Refills: 3 | Status: SHIPPED | OUTPATIENT
Start: 2022-06-10 | End: 2023-01-12 | Stop reason: SDUPTHER

## 2022-06-10 RX ORDER — OXYCODONE AND ACETAMINOPHEN 10; 325 MG/1; MG/1
1 TABLET ORAL EVERY 6 HOURS PRN
Qty: 60 TABLET | Refills: 0 | Status: SHIPPED | OUTPATIENT
Start: 2022-06-10 | End: 2022-06-25

## 2022-06-10 RX ORDER — DEFERASIROX 360 MG/1
1080 TABLET, FILM COATED ORAL DAILY
Qty: 90 TABLET | Refills: 0 | Status: SHIPPED | OUTPATIENT
Start: 2022-06-10 | End: 2022-07-12 | Stop reason: SDUPTHER

## 2022-06-10 RX ORDER — FENTANYL 100 UG/H
1 PATCH TRANSDERMAL
Qty: 10 PATCH | Refills: 0 | Status: SHIPPED | OUTPATIENT
Start: 2022-06-10 | End: 2022-07-12 | Stop reason: SDUPTHER

## 2022-06-10 RX ORDER — PROMETHAZINE HYDROCHLORIDE 25 MG/1
12.5 TABLET ORAL EVERY 6 HOURS PRN
Qty: 30 TABLET | Refills: 1 | Status: SHIPPED | OUTPATIENT
Start: 2022-06-10 | End: 2022-07-05 | Stop reason: SDUPTHER

## 2022-06-17 ENCOUNTER — HOSPITAL ENCOUNTER (EMERGENCY)
Facility: HOSPITAL | Age: 32
Discharge: HOME | End: 2022-06-17
Attending: EMERGENCY MEDICINE
Payer: MEDICAID

## 2022-06-17 VITALS
TEMPERATURE: 100 F | HEIGHT: 67 IN | RESPIRATION RATE: 20 BRPM | SYSTOLIC BLOOD PRESSURE: 129 MMHG | BODY MASS INDEX: 27.15 KG/M2 | HEART RATE: 104 BPM | WEIGHT: 173 LBS | DIASTOLIC BLOOD PRESSURE: 80 MMHG | OXYGEN SATURATION: 100 %

## 2022-06-17 DIAGNOSIS — D57.00 SICKLE CELL ANEMIA WITH PAIN: Primary | ICD-10-CM
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
## 2022-06-17 LAB
ANISOCYTOSIS BLD QL SMEAR: ABNORMAL
BASOPHILS # BLD AUTO: 0.08 X10(3)/MCL (ref 0–0.2)
BASOPHILS NFR BLD AUTO: 0.8 %
EOSINOPHIL # BLD AUTO: 0.15 X10(3)/MCL (ref 0–0.9)
EOSINOPHIL NFR BLD AUTO: 1.5 %
ERYTHROCYTE [DISTWIDTH] IN BLOOD BY AUTOMATED COUNT: 14.2 % (ref 11.5–17)
HCT VFR BLD AUTO: 24.7 % (ref 37–47)
HGB BLD-MCNC: 8.7 GM/DL (ref 12–16)
HYPOCHROMIA BLD QL SMEAR: SLIGHT
IMM GRANULOCYTES # BLD AUTO: 0.04 X10(3)/MCL (ref 0–0.02)
IMM GRANULOCYTES NFR BLD AUTO: 0.4 % (ref 0–0.43)
LYMPHOCYTES # BLD AUTO: 3.43 X10(3)/MCL (ref 0.6–4.6)
LYMPHOCYTES NFR BLD AUTO: 33.6 %
MACROCYTES BLD QL SMEAR: ABNORMAL
MCH RBC QN AUTO: 37.7 PG (ref 27–31)
MCHC RBC AUTO-ENTMCNC: 35.2 MG/DL (ref 33–36)
MCV RBC AUTO: 106.9 FL (ref 80–94)
MONOCYTES # BLD AUTO: 0.75 X10(3)/MCL (ref 0.1–1.3)
MONOCYTES NFR BLD AUTO: 7.4 %
NEUTROPHILS # BLD AUTO: 5.8 X10(3)/MCL (ref 2.1–9.2)
NEUTROPHILS NFR BLD AUTO: 56.3 %
NRBC BLD AUTO-RTO: 0.5 %
PLATELET # BLD AUTO: 363 X10(3)/MCL (ref 130–400)
PLATELET # BLD EST: ADEQUATE 10*3/UL
PLATELETS.RETICULATED NFR BLD AUTO: 2.4 % (ref 0.9–11.2)
PMV BLD AUTO: 9.7 FL (ref 9.4–12.4)
RBC # BLD AUTO: 2.31 X10(6)/MCL (ref 4.2–5.4)
RBC MORPH BLD: ABNORMAL
TARGETS BLD QL SMEAR: SLIGHT
WBC # SPEC AUTO: 10.2 X10(3)/MCL (ref 4.5–11.5)

## 2022-06-17 PROCEDURE — 63600175 PHARM REV CODE 636 W HCPCS: Performed by: EMERGENCY MEDICINE

## 2022-06-17 PROCEDURE — 85025 COMPLETE CBC W/AUTO DIFF WBC: CPT | Performed by: EMERGENCY MEDICINE

## 2022-06-17 PROCEDURE — 25000003 PHARM REV CODE 250: Performed by: EMERGENCY MEDICINE

## 2022-06-17 PROCEDURE — 96372 THER/PROPH/DIAG INJ SC/IM: CPT | Performed by: EMERGENCY MEDICINE

## 2022-06-17 PROCEDURE — 99284 EMERGENCY DEPT VISIT MOD MDM: CPT | Mod: 25

## 2022-06-17 PROCEDURE — 36415 COLL VENOUS BLD VENIPUNCTURE: CPT | Performed by: EMERGENCY MEDICINE

## 2022-06-17 RX ORDER — DIPHENHYDRAMINE HYDROCHLORIDE 50 MG/ML
25 INJECTION INTRAMUSCULAR; INTRAVENOUS
Status: COMPLETED | OUTPATIENT
Start: 2022-06-17 | End: 2022-06-17

## 2022-06-17 RX ORDER — OXYCODONE AND ACETAMINOPHEN 10; 325 MG/1; MG/1
1 TABLET ORAL ONCE
Status: COMPLETED | OUTPATIENT
Start: 2022-06-17 | End: 2022-06-17

## 2022-06-17 RX ORDER — ONDANSETRON 4 MG/1
4 TABLET, ORALLY DISINTEGRATING ORAL
Status: COMPLETED | OUTPATIENT
Start: 2022-06-17 | End: 2022-06-17

## 2022-06-17 RX ORDER — HYDROMORPHONE HYDROCHLORIDE 2 MG/ML
1 INJECTION, SOLUTION INTRAMUSCULAR; INTRAVENOUS; SUBCUTANEOUS
Status: COMPLETED | OUTPATIENT
Start: 2022-06-17 | End: 2022-06-17

## 2022-06-17 RX ADMIN — OXYCODONE AND ACETAMINOPHEN 1 TABLET: 10; 325 TABLET ORAL at 09:06

## 2022-06-17 RX ADMIN — DIPHENHYDRAMINE HYDROCHLORIDE 25 MG: 50 INJECTION INTRAMUSCULAR; INTRAVENOUS at 09:06

## 2022-06-17 RX ADMIN — HYDROMORPHONE HYDROCHLORIDE 1 MG: 2 INJECTION, SOLUTION INTRAMUSCULAR; INTRAVENOUS; SUBCUTANEOUS at 09:06

## 2022-06-17 RX ADMIN — ONDANSETRON 4 MG: 4 TABLET, ORALLY DISINTEGRATING ORAL at 09:06
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
## 2022-06-20 ENCOUNTER — OFFICE VISIT (OUTPATIENT)
Dept: HEMATOLOGY/ONCOLOGY | Facility: CLINIC | Age: 32
End: 2022-06-20
Payer: MEDICAID

## 2022-06-20 VITALS
HEART RATE: 111 BPM | OXYGEN SATURATION: 99 % | BODY MASS INDEX: 26.78 KG/M2 | RESPIRATION RATE: 18 BRPM | TEMPERATURE: 99 F | SYSTOLIC BLOOD PRESSURE: 131 MMHG | DIASTOLIC BLOOD PRESSURE: 84 MMHG | WEIGHT: 171 LBS

## 2022-06-20 DIAGNOSIS — R63.0 DECREASED APPETITE: ICD-10-CM

## 2022-06-20 DIAGNOSIS — D57.1 SICKLE CELL DISEASE WITHOUT CRISIS: Primary | ICD-10-CM

## 2022-06-20 DIAGNOSIS — D57.00 HB-SS DISEASE WITH CRISIS: ICD-10-CM

## 2022-06-20 PROCEDURE — 3079F DIAST BP 80-89 MM HG: CPT | Mod: CPTII,,, | Performed by: INTERNAL MEDICINE

## 2022-06-20 PROCEDURE — 3075F PR MOST RECENT SYSTOLIC BLOOD PRESS GE 130-139MM HG: ICD-10-PCS | Mod: CPTII,,, | Performed by: INTERNAL MEDICINE

## 2022-06-20 PROCEDURE — 3008F PR BODY MASS INDEX (BMI) DOCUMENTED: ICD-10-PCS | Mod: CPTII,,, | Performed by: INTERNAL MEDICINE

## 2022-06-20 PROCEDURE — 3075F SYST BP GE 130 - 139MM HG: CPT | Mod: CPTII,,, | Performed by: INTERNAL MEDICINE

## 2022-06-20 PROCEDURE — 99215 OFFICE O/P EST HI 40 MIN: CPT | Mod: ,,, | Performed by: INTERNAL MEDICINE

## 2022-06-20 PROCEDURE — 99215 PR OFFICE/OUTPT VISIT, EST, LEVL V, 40-54 MIN: ICD-10-PCS | Mod: ,,, | Performed by: INTERNAL MEDICINE

## 2022-06-20 PROCEDURE — 3079F PR MOST RECENT DIASTOLIC BLOOD PRESSURE 80-89 MM HG: ICD-10-PCS | Mod: CPTII,,, | Performed by: INTERNAL MEDICINE

## 2022-06-20 PROCEDURE — 3008F BODY MASS INDEX DOCD: CPT | Mod: CPTII,,, | Performed by: INTERNAL MEDICINE

## 2022-06-20 NOTE — PROGRESS NOTES
DATE:   06/20/2022    PROBLEM:  Sickle cell disease (SS)  With acute exacerbation    HxPI:   33 y/o F w/ PMHx of SCD and HTN now in for follow-up visit.     On hydrea started +/- 2018  Has been on folic acid for years  No ACS ever in her life  never intubated  Never full exchange transfusions  >20 lifetime transfusions, in 2019 pt reports ~10 units of PRBC overall  No AVN  Never told she has iron overload and has never been on iron chelation  No strokes  Not on any pediatric transfusion regimen  >10 pain crisis per year (occur almost with every cycle requiring frequent hospital visits)  hospitalization 2/2021, had partial exchange of 1 unit  Most recent hospitalization 11/2021, received 1 unit PRBC and 3 days of Desferal    Today   06/20/2022:    Her only brother was 28 years old was just shot and killed by a gunshot wound after an altercation.  This has caused the patient a great deal of emotional stress and she believes that she is in her early sickle cell crisis exacerbation laboratory data confirms hematocrit down to the 22 range with a hemoglobin of 8 and a retic count of 6.44.  Patient's LDH is elevated at 250, ferritin is at 1124.        PMHx: SCD, HTN, secondary iron overload  PSHx: tubal ligation, port  Social Hx: no tobacco, ETOH or drugs  Family Hx: unknown cancer in grandmother, other family members with SCD and trait  Meds: reviewed  Allergies: aspirin, compazine, morphine, nubain, stadol, toradol etc    Labs:  06/20/22 Hgb 8.0, Hct 22.8, , Bili 1.5, RETIC @ 6.44  9/5/17 Hg A 9.3 Hg A2 3.8 Hg F 3.6 Hg S 83.3  6/3/2020 hgb 9.5, WBC 10.7, , MCV 96.7, RDW 18.4 Hg A 30.6 Hg F 5.2% Hg S 60.7%  8/10/20 Hg F 7.8% Hg A 10.8% Hg S 77.4% ferritin 2997 Cr 0.64 Hg 9 .3  ANC 7.9  10/5/20 Cr 0.63, Tbili 2.4, , Ferritin 2556, WBC 8.23, Hgb 8.9, .5, , retic 8.41, ANC 4.78, Urine Protein 11.56, Hgb A 0% A21 3.8% F 8.9% S 83.5%  11/23/20 Cr 0.69, TP 8.5, ALB 4.6, alk phos  77, T bili 2.2, , iron 151, TIBC 194, iron sat 78%, ferritin 2746, WBC 8.47, Hgb 9.2, , retic 26K, ANC 5.69, urine protein 40.21, Hgb F 10.2, Hgb S 83.8  1/27/21 Hg A 0% A2 3.8 F 11.3% S 82.5% Other 2.4% Cr 0.61 Alb 4.3 TP 7.8 Tbili 2.1 AST 19 ALT 10  Iron 128 Ferritin 2218 WBC 9.87 Hg 9.1 .2  ANC 6.6 Abs retic 125k urine protein 12.17 mg/dl  3/31/21 Cr 0.69, TP 8.1, T bili 2.2, , Ferritin 2166, Folic acid 19.68, WBC 8.23, Hgb 8.0, .3, , ANC 3.90, abs retic ~175K, urine protein neg, urine culture neg, Hgb A 0%, Hgb A2 4.1%, Hgb F 11.7%, Hgb S 80.5% other 3.7%  8/23/21 WBC 9.59, Hgb 7.9, .4, , Cr 0.74, Tbili 1.5, , Folic acid 10.48, Ferritin 1460, UA protein neg, Hgb A2 3.5% F 10.8% S 82.6%  10/15/21 Hg A2 3.2% Hg F 9.2% Hg S 84.5% Cr 0.67 Alb 4.5 Tbili 1.8  Ferritin 1850 Folate 11.45 WBC 10.47 Hg 8.5  Retic 192k UA protein neg  12/8/21 HGB electrophoresis pending**, , Ferritin 1654, Folic acid 13.44, WBC 10.39, Hgb 8.1, PLT 42, Retic 6.13%, Urine protein neg    Imaging:  reviewed, X-Rays of b/l hips 11/2019 no AVN, CXR unremarkable    Path: none     Review of Systems    CONSTITUTIONAL: no fevers, no chills, no weight loss, but feels markedly weak and fatigued.  Having symptoms of abdominal pain and joint discomfort at this time.  HEMATOLOGIC: no abnormal bleeding, no abnormal bruising, no drenching  night sweats  ONCOLOGIC: no new masses or lumps  HEENT: no vision loss, no tinnitus or hearing loss, no nose bleeding, no dysphagia, no odynophagia  CVS: no chest pain, no palpitations, no dyspnea on exertion  RESP: no shortness of breath, no hemoptysis, no cough  BREAST: no nipple discharge, no breast tenderness, no breast masses on self breast examination  GI: + nausea, +occasional vomiting, no diarrhea, no constipation, no melena, no hematochezia, no hematemesis, + abdominal pain, no increase in abdominal girth  : no  dysuria, no hematuria, no discharge  GYN: no abnormal vaginal bleeding, no dyspareunia, no vaginal discharge  INTEGUMENT: no rashes, no abnormal bruising, no nail pitting, no hyperpigmentation  NEURO: no falls, no memory loss, no paresthesias or dysesthesias, no urofecal incontinence or retention, no loss of strength on any extremity  MSK: + back pain, + multi joint pain, no joint swelling  PSYCH: no suicidal or homicidal ideation, no depression, no insomnia, no anhedonia  ENDOCRINE: no heat or cold intolerance, no polyuria, no polydipsia     Physical Exam  Vitals & Measurements  T: 98.0  °F (Oral)  HR: 110(Peripheral)  RR: 16  BP: 145/96  SpO2: 95%    HT: 169.00 cm  WT: 82.400 kg  BMI: 28.85  LMP: 12/07/2021 00:00 CST        ECOG PS 0  GA: AAOx3, NAD  HEENT: NCAT, PERRLA, EOMI, good dentition, no oral ulcers  LYMPH: no cervical, axillary or supraclavicular adenopathy  CVS: s1s2 RRR, no M/R/G  RESP: CTA b/l, no crackles, no wheezes or rhonchi  ABD: soft, NT, ND, BS+, no hepatosplenomegaly  EXT: no deformities, no pedal edema  SKIN: no rashes, no bruises or purpura, warm and dry  NEURO: normal mentation, strength 5/5 on all 4 extremities, no sensory deficits        Referrals      Clinic Follow up, *Est. 02/07/22 3:00:00 CST, Order for future visit, Sickle cell disease, CCA at [facility]      Clinic Follow up, 02/07/22 13:40:00 CST, Order for future visit      Clinic Follow up, 12/13/21 13:30:00 CST     Problem List/Past Medical History    Ongoing  Hemochromatosis    Hemoglobin SS disease with crisis    Sickle cell    Sickle cell disease    Historical  Sickle cell    Sickle cell pain crisis    Procedure/Surgical History  Transfusion of Nonautologous Red Blood Cells into Peripheral Vein, Percutaneous Approach (09/09/2019)  Transfusion of packed cells (04/27/2015)  Tubal ligation (04/26/2014)  mediport insertion    Medications    fentaNYL 100 mcg/hr transdermal film, extended release, 1 patch(es), TOP,  q72hr    fentaNYL 100 mcg/hr transdermal film, extended release, 1 patch(es), TOP, q72hr    folic acid 1 mg oral tablet, 1 mg= 1 tab(s), Oral, Daily, 6 refills    folic acid 1 mg oral tablet, 1 mg= 1 tab(s), Oral, Daily, 3 refills,   Not taking: duplicate    Hydrea 500 mg oral capsule, See Instructions, 1 refills,   Not taking: duplicate    Hydrea 500 mg oral capsule, 2000 mg= 4 cap(s), Oral, Daily, 1 refills    Jadenu 360 mg oral tablet, 1080 mg= 3 tab(s), Oral, Daily, 1 refills    Jadenu 360 mg oral tablet, 1080 mg= 3 tab(s), Oral, Daily, 1 refills,   Not taking: duplicate    Jadenu 360 mg oral tablet, 1080 mg= 3 tab(s), Oral, Daily, 1 refills,   Not taking: duplicate    Mirena 52 mg intrauteral device, 1 EA, Intrauteral, Once    Percocet 10/325 oral tablet, See Instructions    Percocet 10/325 oral tablet, 1 tab(s), Oral, q6hr, PRN    Phenergan 12.5 mg Tab, 12.5 mg= 1 tab(s), Oral, q6hr, PRN    Phenergan 12.5 mg Tab, 12.5 mg= 1 tab(s), Oral, q4hr, PRN, 1 refills    Zofran 8 mg oral tablet, 8 mg= 1 tab(s), Oral, q8hr, PRN, 3 refills    Allergies    Compazine (swelling)    Nubain    Stadol    Toradol    aspirin (hives)    ketorolac (other)    morphine (hives)    traMADol    Exercise duration: 15. Exercise frequency: 1-2 times/week. Exercise type: Walking., 12/09/2020      Home/Environment      Lives with Mother. Living situation: Home/Independent., 01/08/2020      Nutrition/Health      Regular, Poor, 12/09/2020      Sexual      Sexually active: Yes. No, 12/09/2020      Spiritual/Cultural      Lutheran, Yes, 12/09/2020      Substance Use      Never, 12/09/2020      Tobacco - Denies Tobacco Use, 04/25/2015      Never (less than 100 in lifetime), No, 12/13/2021    Family History    Hypertension.: Mother and Sister.    Immunizations      Vaccine    Date    Status      influenza virus vaccine, inactivated 11/02/2007 Recorded   human papillomavirus vaccine 05/07/2007 Recorded   haemophilus b-hepatitis B vaccine 11/16/2001  Recorded   pneumococcal 23-polyvalent vaccine 10/20/1999 Recorded   influenza virus vaccine, inactivated 10/20/1999 Recorded     Health Maintenance  Health Maintenance     Pending (in the next year)        OverDue           Alcohol Misuse Screening due  01/02/21  and every 1  year(s)        Due            Cervical Cancer Screening due  12/13/21  Unknown Frequency           Tetanus Vaccine due  12/13/21  and every 10  year(s)        Due In Future            Obesity Screening not due until  01/01/22  and every 1  year(s)           Hypertension Management-BMP not due until  11/24/22  and every 1  year(s)           ADL Screening not due until  11/24/22  and every 1  year(s)     Satisfied (in the past 1 year)        Satisfied            ADL Screening on  11/24/21.  Satisfied by Oksana Sun           Blood Pressure Screening on  12/13/21.  Satisfied by Sakina Walker           Body Mass Index Check on  12/13/21.  Satisfied by Sakina Walker           Depression Screening on  12/13/21.  Satisfied by Sakina Walker           Diabetes Screening on  11/24/21.  Satisfied by Brittni Penaloza           Hypertension Management-Blood Pressure on  12/13/21.  Satisfied by Sakina Walker           Influenza Vaccine on  12/13/21.  Satisfied by Sakina Walker           Obesity Screening on  12/13/21.  Satisfied by Sakina Walker       Lab       Assessment:    1. Sickle cell disease D57.1  Early acute crisis  Hydrea 2500mg daily (dose increased from 2000mg to 2500mg on 12/4/20), Hg F not at goal and pt has not been taking correct dose due to GI toxicity. She has only been taking 3 tabs since her last visit. Increase to 4 tabs 2000mg daily, 2 tabs in AM and 2 tabs in PM  C/w folic acid 1mg daily  Adequate PO hydration  Analgesia with fentanyl and percocet 10mg as needed. C/w fentanyl to 100mcg/hr, I advised her to use fentanyl consistently. I also advised her to use PRN motrin 400-600mg as needed  Avoid  overtransfusion. Only transfuse if symptomatic anemia. Do not transfuse for simple pain crisis  Iron overload is noted. She had upset stomach previously with iron chelation. Initiated jadenu at 360mg 1 tab daily 8/2020, increased to 720mg and then increased to 1080mg on 12/4/20. Decreased to 720mg on 8/24/21 due to GI toxicity. She states she has been taking 3 tabs (1080mg) daily, will continue as she states she has been tolerating it okay    PLAN:  Contact hospitalist service.  Patient will need admission for hydration and transfusion.  Continue chelation treatment.  Next follow-up in 8 weeks or sooner on a p.r.n. basis.    AMY ZAVALA M.D., FACP  Answers for HPI/ROS submitted by the patient on 6/19/2022  appetite change : Yes  unexpected weight change: Yes  mouth sores: No  visual disturbance: No  cough: No  shortness of breath: No  chest pain: Yes  abdominal pain: Yes  diarrhea: Yes  frequency: Yes  back pain: Yes  rash: No  headaches: Yes  adenopathy: No  nervous/ anxious: No

## 2022-06-22 RX ORDER — PROMETHAZINE HYDROCHLORIDE 25 MG/1
12.5 TABLET ORAL EVERY 6 HOURS PRN
Qty: 30 TABLET | Refills: 1 | Status: CANCELLED | OUTPATIENT
Start: 2022-06-22

## 2022-06-22 RX ORDER — CYPROHEPTADINE HYDROCHLORIDE 4 MG/1
4 TABLET ORAL 3 TIMES DAILY PRN
Qty: 90 TABLET | Refills: 1 | Status: CANCELLED | OUTPATIENT
Start: 2022-06-22

## 2022-06-30 DIAGNOSIS — D57.00 HB-SS DISEASE WITH CRISIS: ICD-10-CM

## 2022-07-01 RX ORDER — OXYCODONE AND ACETAMINOPHEN 10; 325 MG/1; MG/1
1 TABLET ORAL EVERY 6 HOURS PRN
Qty: 60 TABLET | Refills: 0 | OUTPATIENT
Start: 2022-07-01 | End: 2022-07-16

## 2022-07-12 DIAGNOSIS — D57.00 SICKLE CELL PAIN CRISIS: ICD-10-CM

## 2022-07-12 DIAGNOSIS — D57.00 HB-SS DISEASE WITH CRISIS: ICD-10-CM

## 2022-07-13 RX ORDER — DEFERASIROX 360 MG/1
1080 TABLET, FILM COATED ORAL DAILY
Qty: 90 TABLET | Refills: 3 | Status: SHIPPED | OUTPATIENT
Start: 2022-07-13 | End: 2023-01-12 | Stop reason: SDUPTHER

## 2022-07-13 RX ORDER — FENTANYL 100 UG/H
1 PATCH TRANSDERMAL
Qty: 10 PATCH | Refills: 0 | Status: SHIPPED | OUTPATIENT
Start: 2022-07-13 | End: 2022-08-18 | Stop reason: SDUPTHER

## 2022-07-13 RX ORDER — OXYCODONE AND ACETAMINOPHEN 10; 325 MG/1; MG/1
1 TABLET ORAL EVERY 6 HOURS PRN
Qty: 30 TABLET | Refills: 0 | Status: SHIPPED | OUTPATIENT
Start: 2022-07-13 | End: 2022-07-21 | Stop reason: SDUPTHER

## 2022-07-13 RX ORDER — PROMETHAZINE HYDROCHLORIDE 25 MG/1
12.5 TABLET ORAL EVERY 6 HOURS PRN
Qty: 30 TABLET | Refills: 1 | Status: SHIPPED | OUTPATIENT
Start: 2022-07-13 | End: 2022-10-12 | Stop reason: SDUPTHER

## 2022-07-14 DIAGNOSIS — D57.00 HB-SS DISEASE WITH CRISIS: ICD-10-CM

## 2022-07-14 DIAGNOSIS — D57.00 SICKLE CELL PAIN CRISIS: ICD-10-CM

## 2022-07-14 RX ORDER — HYDROXYUREA 500 MG/1
2000 CAPSULE ORAL DAILY
Qty: 120 CAPSULE | Refills: 1 | OUTPATIENT
Start: 2022-07-14

## 2022-07-14 RX ORDER — FOLIC ACID 1 MG/1
1 TABLET ORAL DAILY
Qty: 90 TABLET | Refills: 3 | OUTPATIENT
Start: 2022-07-14 | End: 2023-07-14

## 2022-07-14 RX ORDER — DEFERASIROX 360 MG/1
1080 TABLET, FILM COATED ORAL DAILY
Qty: 90 TABLET | Refills: 3 | OUTPATIENT
Start: 2022-07-14

## 2022-07-21 DIAGNOSIS — D57.00 HB-SS DISEASE WITH CRISIS: ICD-10-CM

## 2022-07-21 RX ORDER — OXYCODONE AND ACETAMINOPHEN 10; 325 MG/1; MG/1
1 TABLET ORAL EVERY 6 HOURS PRN
Qty: 30 TABLET | Refills: 0 | Status: CANCELLED | OUTPATIENT
Start: 2022-07-21

## 2022-07-21 RX ORDER — OXYCODONE AND ACETAMINOPHEN 10; 325 MG/1; MG/1
1 TABLET ORAL EVERY 6 HOURS PRN
Qty: 30 TABLET | Refills: 0 | Status: SHIPPED | OUTPATIENT
Start: 2022-07-21 | End: 2022-07-27 | Stop reason: SDUPTHER

## 2022-08-18 ENCOUNTER — OFFICE VISIT (OUTPATIENT)
Dept: HEMATOLOGY/ONCOLOGY | Facility: CLINIC | Age: 32
End: 2022-08-18
Payer: MEDICAID

## 2022-08-18 VITALS — WEIGHT: 173 LBS | HEIGHT: 67 IN | BODY MASS INDEX: 27.15 KG/M2

## 2022-08-18 DIAGNOSIS — R63.0 DECREASED APPETITE: ICD-10-CM

## 2022-08-18 DIAGNOSIS — D57.00 HB-SS DISEASE WITH CRISIS: ICD-10-CM

## 2022-08-18 DIAGNOSIS — D57.00 SICKLE CELL DISEASE WITH CRISIS: Primary | ICD-10-CM

## 2022-08-18 PROCEDURE — 99214 OFFICE O/P EST MOD 30 MIN: CPT | Mod: 95,,, | Performed by: INTERNAL MEDICINE

## 2022-08-18 PROCEDURE — 1159F MED LIST DOCD IN RCRD: CPT | Mod: CPTII,95,, | Performed by: INTERNAL MEDICINE

## 2022-08-18 PROCEDURE — 3008F BODY MASS INDEX DOCD: CPT | Mod: CPTII,95,, | Performed by: INTERNAL MEDICINE

## 2022-08-18 PROCEDURE — 1159F PR MEDICATION LIST DOCUMENTED IN MEDICAL RECORD: ICD-10-PCS | Mod: CPTII,95,, | Performed by: INTERNAL MEDICINE

## 2022-08-18 PROCEDURE — 3008F PR BODY MASS INDEX (BMI) DOCUMENTED: ICD-10-PCS | Mod: CPTII,95,, | Performed by: INTERNAL MEDICINE

## 2022-08-18 PROCEDURE — 1160F PR REVIEW ALL MEDS BY PRESCRIBER/CLIN PHARMACIST DOCUMENTED: ICD-10-PCS | Mod: CPTII,95,, | Performed by: INTERNAL MEDICINE

## 2022-08-18 PROCEDURE — 1160F RVW MEDS BY RX/DR IN RCRD: CPT | Mod: CPTII,95,, | Performed by: INTERNAL MEDICINE

## 2022-08-18 PROCEDURE — 99214 PR OFFICE/OUTPT VISIT, EST, LEVL IV, 30-39 MIN: ICD-10-PCS | Mod: 95,,, | Performed by: INTERNAL MEDICINE

## 2022-08-18 RX ORDER — FENTANYL 100 UG/H
1 PATCH TRANSDERMAL
Qty: 10 PATCH | Refills: 0 | Status: SHIPPED | OUTPATIENT
Start: 2022-08-18 | End: 2022-10-18

## 2022-08-18 RX ORDER — OXYCODONE AND ACETAMINOPHEN 10; 325 MG/1; MG/1
1 TABLET ORAL EVERY 4 HOURS PRN
Qty: 90 TABLET | Refills: 0 | Status: SHIPPED | OUTPATIENT
Start: 2022-08-18 | End: 2022-10-05

## 2022-08-18 NOTE — PROGRESS NOTES
DATE:  08/18/2022    Established Patient - Audio Only Telehealth Visit     The patient location is: HOME  The chief complaint leading to consultation is: SICKLE CELL  Visit type: Virtual visit with audio only (telephone)  Total time spent with patient: 20min.       The reason for the audio only service rather than synchronous audio and video virtual visit was related to technical difficulties or patient preference/necessity.     Each patient to whom I provide medical services by telemedicine is:  (1) informed of the relationship between the physician and patient and the respective role of any other health care provider with respect to management of the patient; and (2) notified that they may decline to receive medical services by telemedicine and may withdraw from such care at any time. Patient verbally consented to receive this service via voice-only telephone call.       HPI:  Continued chronic pain issues from sickle cell disease    ROS:  CONSTITUTIONAL: no fevers, no chills, no weight loss, but feels markedly weak and fatigued.  Having symptoms of abdominal pain and joint discomfort at this time.  HEMATOLOGIC: no abnormal bleeding, no abnormal bruising, no drenching  night sweats  ONCOLOGIC: no new masses or lumps  HEENT: no vision loss, no tinnitus or hearing loss, no nose bleeding, no dysphagia, no odynophagia  CVS: no chest pain, no palpitations, no dyspnea on exertion  RESP: no shortness of breath, no hemoptysis, no cough  BREAST: no nipple discharge, no breast tenderness, no breast masses on self breast examination  GI: + nausea, +occasional vomiting, no diarrhea, no constipation, no melena, no hematochezia, no hematemesis, + abdominal pain, no increase in abdominal girth  : no dysuria, no hematuria, no discharge  GYN: no abnormal vaginal bleeding, no dyspareunia, no vaginal discharge  INTEGUMENT: no rashes, no abnormal bruising, no nail pitting, no hyperpigmentation  NEURO: no falls, no memory loss, no  paresthesias or dysesthesias, no urofecal incontinence or retention, no loss of strength on any extremity  MSK: + back pain, + multi joint pain, no joint swelling  PSYCH: no suicidal or homicidal ideation, no depression, no insomnia, no anhedonia  ENDOCRINE: no heat or cold intolerance, no polyuria, no polydipsia   Answers for HPI/ROS submitted by the patient on 8/16/2022  appetite change : Yes  unexpected weight change: Yes  mouth sores: No  visual disturbance: No  cough: No  shortness of breath: No  chest pain: No  abdominal pain: No  diarrhea: No  frequency: Yes  back pain: Yes  rash: No  headaches: No  adenopathy: No  nervous/ anxious: No    LABS:  WBC 10.11, hemoglobin 8.0, hematocrit 23.3, .4.  Ferritin at 884.    ASSESSMENT:  Active sickle cell disease.  Above transfusion threshold, however.    PLAN:  Refill pain medication.  Patient instructed to stay well hydrated.  She needs to have a face-to-face visit with us within 4 weeks.  We need to talk to her about prospects of new and different treatment such as Crizanlizumab which may improve her chronic pain issues.    AMY ZAVALA M.D., [facility]                   This service was not originating from a related E/M service provided within the previous 7 days nor will  to an E/M service or procedure within the next 24 hours or my soonest available appointment.  Prevailing standard of care was able to be met in this audio-only visit.

## 2022-08-19 RX ORDER — FENTANYL 100 UG/H
1 PATCH TRANSDERMAL
Qty: 10 PATCH | Refills: 0 | Status: SHIPPED | OUTPATIENT
Start: 2022-08-19 | End: 2022-09-15 | Stop reason: SDUPTHER

## 2022-08-19 RX ORDER — OXYCODONE AND ACETAMINOPHEN 10; 325 MG/1; MG/1
1 TABLET ORAL EVERY 6 HOURS PRN
Qty: 30 TABLET | Refills: 0 | Status: SHIPPED | OUTPATIENT
Start: 2022-08-19 | End: 2022-10-05

## 2022-08-19 RX ORDER — CYPROHEPTADINE HYDROCHLORIDE 4 MG/1
4 TABLET ORAL 3 TIMES DAILY PRN
Qty: 90 TABLET | Refills: 1 | Status: SHIPPED | OUTPATIENT
Start: 2022-08-19 | End: 2023-05-23 | Stop reason: SDUPTHER

## 2022-09-08 ENCOUNTER — OFFICE VISIT (OUTPATIENT)
Dept: HEMATOLOGY/ONCOLOGY | Facility: CLINIC | Age: 32
End: 2022-09-08
Payer: MEDICAID

## 2022-09-08 VITALS
TEMPERATURE: 98 F | HEIGHT: 67 IN | RESPIRATION RATE: 18 BRPM | WEIGHT: 180 LBS | BODY MASS INDEX: 28.25 KG/M2 | SYSTOLIC BLOOD PRESSURE: 128 MMHG | HEART RATE: 117 BPM | OXYGEN SATURATION: 100 % | DIASTOLIC BLOOD PRESSURE: 88 MMHG

## 2022-09-08 DIAGNOSIS — D57.00 SICKLE CELL DISEASE WITH CRISIS: Primary | ICD-10-CM

## 2022-09-08 PROCEDURE — 99214 PR OFFICE/OUTPT VISIT, EST, LEVL IV, 30-39 MIN: ICD-10-PCS | Mod: ,,, | Performed by: INTERNAL MEDICINE

## 2022-09-08 PROCEDURE — 3008F PR BODY MASS INDEX (BMI) DOCUMENTED: ICD-10-PCS | Mod: CPTII,,, | Performed by: INTERNAL MEDICINE

## 2022-09-08 PROCEDURE — 3074F SYST BP LT 130 MM HG: CPT | Mod: CPTII,,, | Performed by: INTERNAL MEDICINE

## 2022-09-08 PROCEDURE — 1160F RVW MEDS BY RX/DR IN RCRD: CPT | Mod: CPTII,,, | Performed by: INTERNAL MEDICINE

## 2022-09-08 PROCEDURE — 3079F DIAST BP 80-89 MM HG: CPT | Mod: CPTII,,, | Performed by: INTERNAL MEDICINE

## 2022-09-08 PROCEDURE — 3074F PR MOST RECENT SYSTOLIC BLOOD PRESSURE < 130 MM HG: ICD-10-PCS | Mod: CPTII,,, | Performed by: INTERNAL MEDICINE

## 2022-09-08 PROCEDURE — 1159F PR MEDICATION LIST DOCUMENTED IN MEDICAL RECORD: ICD-10-PCS | Mod: CPTII,,, | Performed by: INTERNAL MEDICINE

## 2022-09-08 PROCEDURE — 3008F BODY MASS INDEX DOCD: CPT | Mod: CPTII,,, | Performed by: INTERNAL MEDICINE

## 2022-09-08 PROCEDURE — 99214 OFFICE O/P EST MOD 30 MIN: CPT | Mod: ,,, | Performed by: INTERNAL MEDICINE

## 2022-09-08 PROCEDURE — 1160F PR REVIEW ALL MEDS BY PRESCRIBER/CLIN PHARMACIST DOCUMENTED: ICD-10-PCS | Mod: CPTII,,, | Performed by: INTERNAL MEDICINE

## 2022-09-08 PROCEDURE — 3079F PR MOST RECENT DIASTOLIC BLOOD PRESSURE 80-89 MM HG: ICD-10-PCS | Mod: CPTII,,, | Performed by: INTERNAL MEDICINE

## 2022-09-08 PROCEDURE — 1159F MED LIST DOCD IN RCRD: CPT | Mod: CPTII,,, | Performed by: INTERNAL MEDICINE

## 2022-09-08 RX ORDER — OXYCODONE AND ACETAMINOPHEN 10; 325 MG/1; MG/1
1 TABLET ORAL EVERY 4 HOURS PRN
Qty: 120 TABLET | Refills: 0 | Status: SHIPPED | OUTPATIENT
Start: 2022-09-08 | End: 2022-10-05

## 2022-09-08 NOTE — PROGRESS NOTES
DATE:   09/08/2022    PROBLEM:  Sickle cell disease (SS)  With acute exacerbation    HxPI:   31 y/o F w/ PMHx of SCD and HTN now in for follow-up visit.     On hydrea started +/- 2018  Has been on folic acid for years  No ACS ever in her life  never intubated  Never full exchange transfusions  >20 lifetime transfusions, in 2019 pt reports ~10 units of PRBC overall  No AVN  Never told she has iron overload and has never been on iron chelation  No strokes  Not on any pediatric transfusion regimen  >10 pain crisis per year (occur almost with every cycle requiring frequent hospital visits)  hospitalization 2/2021, had partial exchange of 1 unit  Most recent hospitalization 11/2021, received 1 unit PRBC and 3 days of Desferal    INTERVAL Hx:  09/08/2022.   Feels weak and fatigued, but no worse than usual.  Continues on oral Jadenu as an iron chelation agent     PMHx: SCD, HTN, secondary iron overload  PSHx: tubal ligation, port  Social Hx: no tobacco, ETOH or drugs  Family Hx: unknown cancer in grandmother, other family members with SCD and trait  Meds: reviewed  Allergies: aspirin, compazine, morphine, nubain, stadol, toradol etc    Imaging:  reviewed, X-Rays of b/l hips 11/2019 no AVN, CXR unremarkable    Path: none     Review of Systems    CONSTITUTIONAL: no fevers, no chills, no weight loss, but feels markedly weak and fatigued.  Having symptoms of abdominal pain and joint discomfort at this time.  HEMATOLOGIC: no abnormal bleeding, no abnormal bruising, no drenching  night sweats  ONCOLOGIC: no new masses or lumps  HEENT: no vision loss, no tinnitus or hearing loss, no nose bleeding, no dysphagia, no odynophagia  CVS: no chest pain, no palpitations, no dyspnea on exertion  RESP: no shortness of breath, no hemoptysis, no cough  BREAST: no nipple discharge, no breast tenderness, no breast masses on self breast examination  GI: + nausea, +occasional vomiting, no diarrhea, no constipation, no melena, no hematochezia, no  hematemesis, + abdominal pain, no increase in abdominal girth  : no dysuria, no hematuria, no discharge  GYN: no abnormal vaginal bleeding, no dyspareunia, no vaginal discharge  INTEGUMENT: no rashes, no abnormal bruising, no nail pitting, no hyperpigmentation  NEURO: no falls, no memory loss, no paresthesias or dysesthesias, no urofecal incontinence or retention, no loss of strength on any extremity  MSK: + back pain, + multi joint pain, no joint swelling  PSYCH: no suicidal or homicidal ideation, no depression, no insomnia, no anhedonia  ENDOCRINE: no heat or cold intolerance, no polyuria, no polydipsia     Physical Exam  Vitals & Measurements  T: 98.0  °F (Oral)  HR: 110(Peripheral)  RR: 16  BP: 145/96  SpO2: 95%    HT: 169.00 cm  WT: 82.400 kg  BMI: 28.85  LMP: 12/07/2021 00:00 CST        ECOG PS 0  GA: AAOx3, NAD  HEENT: NCAT, PERRLA, EOMI, good dentition, no oral ulcers  LYMPH: no cervical, axillary or supraclavicular adenopathy  CVS: s1s2 RRR, no M/R/G  RESP: CTA b/l, no crackles, no wheezes or rhonchi  ABD: soft, NT, ND, BS+, no hepatosplenomegaly  EXT: no deformities, no pedal edema  SKIN: no rashes, no bruises or purpura, warm and dry  NEURO: normal mentation, strength 5/5 on all 4 extremities, no sensory deficits        Referrals      Clinic Follow up, *Est. 02/07/22 3:00:00 CST, Order for future visit, Sickle cell disease, [facility] at [facility]      Clinic Follow up, 02/07/22 13:40:00 CST, Order for future visit      Clinic Follow up, 12/13/21 13:30:00 CST     Problem List/Past Medical History    Ongoing  Hemochromatosis    Hemoglobin SS disease with crisis    Sickle cell    Sickle cell disease    Historical  Sickle cell    Sickle cell pain crisis    Procedure/Surgical History  Transfusion of Nonautologous Red Blood Cells into Peripheral Vein, Percutaneous Approach (09/09/2019)  Transfusion of packed cells (04/27/2015)  Tubal ligation (04/26/2014)  mediport insertion    Medications    fentaNYL  100 mcg/hr transdermal film, extended release, 1 patch(es), TOP, q72hr    fentaNYL 100 mcg/hr transdermal film, extended release, 1 patch(es), TOP, q72hr    folic acid 1 mg oral tablet, 1 mg= 1 tab(s), Oral, Daily, 6 refills    folic acid 1 mg oral tablet, 1 mg= 1 tab(s), Oral, Daily, 3 refills,   Not taking: duplicate    Hydrea 500 mg oral capsule, See Instructions, 1 refills,   Not taking: duplicate    Hydrea 500 mg oral capsule, 2000 mg= 4 cap(s), Oral, Daily, 1 refills    Jadenu 360 mg oral tablet, 1080 mg= 3 tab(s), Oral, Daily, 1 refills    Jadenu 360 mg oral tablet, 1080 mg= 3 tab(s), Oral, Daily, 1 refills,   Not taking: duplicate    Jadenu 360 mg oral tablet, 1080 mg= 3 tab(s), Oral, Daily, 1 refills,   Not taking: duplicate    Mirena 52 mg intrauteral device, 1 EA, Intrauteral, Once    Percocet 10/325 oral tablet, See Instructions    Percocet 10/325 oral tablet, 1 tab(s), Oral, q6hr, PRN    Phenergan 12.5 mg Tab, 12.5 mg= 1 tab(s), Oral, q6hr, PRN    Phenergan 12.5 mg Tab, 12.5 mg= 1 tab(s), Oral, q4hr, PRN, 1 refills    Zofran 8 mg oral tablet, 8 mg= 1 tab(s), Oral, q8hr, PRN, 3 refills    Allergies    Compazine (swelling)    Nubain    Stadol    Toradol    aspirin (hives)    ketorolac (other)    morphine (hives)    traMADol    Exercise duration: 15. Exercise frequency: 1-2 times/week. Exercise type: Walking., 12/09/2020      Home/Environment      Lives with Mother. Living situation: Home/Independent., 01/08/2020      Nutrition/Health      Regular, Poor, 12/09/2020      Sexual      Sexually active: Yes. No, 12/09/2020      Spiritual/Cultural      Spiritism, Yes, 12/09/2020      Substance Use      Never, 12/09/2020      Tobacco - Denies Tobacco Use, 04/25/2015      Never (less than 100 in lifetime), No, 12/13/2021    Family History    Hypertension.: Mother and Sister.    Immunizations      Vaccine    Date    Status      influenza virus vaccine, inactivated 11/02/2007 Recorded   human papillomavirus vaccine  "05/07/2007 Recorded   haemophilus b-hepatitis B vaccine 11/16/2001 Recorded   pneumococcal 23-polyvalent vaccine 10/20/1999 Recorded   influenza virus vaccine, inactivated 10/20/1999 Recorded     Health Maintenance  Health Maintenance     Pending (in the next year)        OverDue           Alcohol Misuse Screening due  01/02/21  and every 1  year(s)        Due            Cervical Cancer Screening due  12/13/21  Unknown Frequency           Tetanus Vaccine due  12/13/21  and every 10  year(s)        Due In Future            Obesity Screening not due until  01/01/22  and every 1  year(s)           Hypertension Management-BMP not due until  11/24/22  and every 1  year(s)           ADL Screening not due until  11/24/22  and every 1  year(s)     Satisfied (in the past 1 year)        Satisfied            ADL Screening on  11/24/21.  Satisfied by Oksana Sun           Blood Pressure Screening on  12/13/21.  Satisfied by Sakina Walker           Body Mass Index Check on  12/13/21.  Satisfied by Sakina Walker           Depression Screening on  12/13/21.  Satisfied by Sakina Walker           Diabetes Screening on  11/24/21.  Satisfied by Brittni Penaloza           Hypertension Management-Blood Pressure on  12/13/21.  Satisfied by Sakina Walker           Influenza Vaccine on  12/13/21.  Satisfied by Sakina Walker           Obesity Screening on  12/13/21.  Satisfied by Sakina Walker       LABS:  09/08/2022:  WBC 9.8, hemoglobin 7.6, hematocrit 21.7, platelet count 3 8 6 K    Assessment:  Sickle cell disease D57.1.  The counts were just above transfusion threshold, but that can change "on a dime.    PLAN:  Follow-up in 2 weeks.  Counts on RTC.  Give the patient literature to read about OXBRYTA.  This might improve her sense of well-being and decreased transfusion needs if it is efficacious in her situation.    AMY ZAVALA M.D., FACP  "

## 2022-09-15 DIAGNOSIS — D57.00 HB-SS DISEASE WITH CRISIS: ICD-10-CM

## 2022-09-16 RX ORDER — FENTANYL 100 UG/H
1 PATCH TRANSDERMAL
Qty: 10 PATCH | Refills: 0 | Status: SHIPPED | OUTPATIENT
Start: 2022-09-16 | End: 2022-10-12 | Stop reason: SDUPTHER

## 2022-10-05 DIAGNOSIS — D57.00 SICKLE CELL DISEASE WITH CRISIS: Primary | ICD-10-CM

## 2022-10-05 RX ORDER — OXYCODONE AND ACETAMINOPHEN 10; 325 MG/1; MG/1
1 TABLET ORAL EVERY 4 HOURS PRN
Qty: 90 TABLET | Refills: 0 | Status: CANCELLED | OUTPATIENT
Start: 2022-10-05

## 2022-10-05 RX ORDER — OXYCODONE AND ACETAMINOPHEN 10; 325 MG/1; MG/1
1 TABLET ORAL EVERY 4 HOURS PRN
Qty: 120 TABLET | Refills: 0 | Status: SHIPPED | OUTPATIENT
Start: 2022-10-05 | End: 2022-10-31 | Stop reason: SDUPTHER

## 2022-10-05 NOTE — TELEPHONE ENCOUNTER
Called the pharmacy spoke with Niki, and she spoke with pharmacist pt has already signed contract from a different provider stating that she will not receive any pain meds from another provider.    Called pt back  she states that she signed the form with Dr. Izquierdo at [facility] in [place] pt states that he not there anymore.

## 2022-10-06 ENCOUNTER — OFFICE VISIT (OUTPATIENT)
Dept: HEMATOLOGY/ONCOLOGY | Facility: CLINIC | Age: 32
End: 2022-10-06
Payer: MEDICAID

## 2022-10-06 DIAGNOSIS — D57.00 SICKLE CELL DISEASE WITH CRISIS: Primary | ICD-10-CM

## 2022-10-06 PROCEDURE — 1159F MED LIST DOCD IN RCRD: CPT | Mod: CPTII,95,, | Performed by: INTERNAL MEDICINE

## 2022-10-06 PROCEDURE — 99213 PR OFFICE/OUTPT VISIT, EST, LEVL III, 20-29 MIN: ICD-10-PCS | Mod: 95,,, | Performed by: INTERNAL MEDICINE

## 2022-10-06 PROCEDURE — 99213 OFFICE O/P EST LOW 20 MIN: CPT | Mod: 95,,, | Performed by: INTERNAL MEDICINE

## 2022-10-06 PROCEDURE — 1159F PR MEDICATION LIST DOCUMENTED IN MEDICAL RECORD: ICD-10-PCS | Mod: CPTII,95,, | Performed by: INTERNAL MEDICINE

## 2022-10-06 NOTE — PROGRESS NOTES
Established Patient - Audio Only Telehealth Visit     The patient location is:HOME  The chief complaint leading to consultation is: SICKLE CELL F/U  Visit type: Virtual visit with audio only (telephone)  Total time spent with patient: 12 MIN.       The reason for the audio only service rather than synchronous audio and video virtual visit was related to technical difficulties or patient preference/necessity.     Each patient to whom I provide medical services by telemedicine is:  (1) informed of the relationship between the physician and patient and the respective role of any other health care provider with respect to management of the patient; and (2) notified that they may decline to receive medical services by telemedicine and may withdraw from such care at any time. Patient verbally consented to receive this service via voice-only telephone call.       HxPI:  Patient feels rundown but no worse than usual.  Blood work drawn yesterday reveals hemoglobin of 8.3 and hematocrit in the 25 range.     Assessment and plan:  Sickle cell disease, active, but not to a point where patient requires another transfusion at this time.  We will have patient get another CBC next week and we will have another TeleMed at that time.     AMY ZAVALA M.D., FACP

## 2022-10-12 DIAGNOSIS — D57.00 HB-SS DISEASE WITH CRISIS: ICD-10-CM

## 2022-10-13 RX ORDER — PROMETHAZINE HYDROCHLORIDE 25 MG/1
12.5 TABLET ORAL EVERY 6 HOURS PRN
Qty: 30 TABLET | Refills: 1 | Status: SHIPPED | OUTPATIENT
Start: 2022-10-13 | End: 2023-02-14 | Stop reason: SDUPTHER

## 2022-10-13 RX ORDER — FENTANYL 100 UG/H
1 PATCH TRANSDERMAL
Qty: 10 PATCH | Refills: 0 | Status: SHIPPED | OUTPATIENT
Start: 2022-10-13 | End: 2022-10-18 | Stop reason: SDUPTHER

## 2022-10-13 RX ORDER — ONDANSETRON 4 MG/1
8 TABLET, FILM COATED ORAL EVERY 8 HOURS PRN
Qty: 30 TABLET | Refills: 3 | Status: SHIPPED | OUTPATIENT
Start: 2022-10-13 | End: 2023-06-21 | Stop reason: SDUPTHER

## 2022-10-18 DIAGNOSIS — D57.00 HB-SS DISEASE WITH CRISIS: ICD-10-CM

## 2022-10-18 RX ORDER — FENTANYL 100 UG/H
1 PATCH TRANSDERMAL
Qty: 10 PATCH | Refills: 0 | Status: SHIPPED | OUTPATIENT
Start: 2022-10-18 | End: 2022-11-15 | Stop reason: SDUPTHER

## 2022-10-31 ENCOUNTER — OFFICE VISIT (OUTPATIENT)
Dept: HEMATOLOGY/ONCOLOGY | Facility: CLINIC | Age: 32
End: 2022-10-31
Payer: MEDICAID

## 2022-10-31 VITALS — WEIGHT: 180 LBS | HEIGHT: 67 IN | BODY MASS INDEX: 28.25 KG/M2

## 2022-10-31 DIAGNOSIS — D57.1 SICKLE CELL DISEASE WITHOUT CRISIS: Primary | ICD-10-CM

## 2022-10-31 PROCEDURE — 1159F PR MEDICATION LIST DOCUMENTED IN MEDICAL RECORD: ICD-10-PCS | Mod: CPTII,95,, | Performed by: INTERNAL MEDICINE

## 2022-10-31 PROCEDURE — 99214 OFFICE O/P EST MOD 30 MIN: CPT | Mod: 95,,, | Performed by: INTERNAL MEDICINE

## 2022-10-31 PROCEDURE — 1160F PR REVIEW ALL MEDS BY PRESCRIBER/CLIN PHARMACIST DOCUMENTED: ICD-10-PCS | Mod: CPTII,95,, | Performed by: INTERNAL MEDICINE

## 2022-10-31 PROCEDURE — 99214 PR OFFICE/OUTPT VISIT, EST, LEVL IV, 30-39 MIN: ICD-10-PCS | Mod: 95,,, | Performed by: INTERNAL MEDICINE

## 2022-10-31 PROCEDURE — 1159F MED LIST DOCD IN RCRD: CPT | Mod: CPTII,95,, | Performed by: INTERNAL MEDICINE

## 2022-10-31 PROCEDURE — 1160F RVW MEDS BY RX/DR IN RCRD: CPT | Mod: CPTII,95,, | Performed by: INTERNAL MEDICINE

## 2022-10-31 RX ORDER — OXYCODONE AND ACETAMINOPHEN 10; 325 MG/1; MG/1
1 TABLET ORAL EVERY 6 HOURS PRN
Qty: 120 TABLET | Refills: 0 | Status: SHIPPED | OUTPATIENT
Start: 2022-10-31 | End: 2022-11-28 | Stop reason: SDUPTHER

## 2022-10-31 NOTE — PROGRESS NOTES
Established Patient - Audio Only Telehealth Visit    PROBLEM:  1.)  Sickle cell disease  2. ) URI symptoms currently on course of antibiotic therapy.     The patient location is: Home  The chief complaint leading to consultation is: SICKLE CELL F/U  Visit type: Virtual visit with audio only (telephone)  Total time spent with patient:12 min       The reason for the audio only service rather than synchronous audio and video virtual visit was related to technical difficulties or patient preference/necessity.     Each patient to whom I provide medical services by telemedicine is:  (1) informed of the relationship between the physician and patient and the respective role of any other health care provider with respect to management of the patient; and (2) notified that they may decline to receive medical services by telemedicine and may withdraw from such care at any time. Patient verbally consented to receive this service via voice-only telephone call.    INTERVAL Hx:  10/31/2021:  Feels rundown diminished energy level, but not to point were she feels as if she needs a transfusion as of yet.  By the way she feels and with the familiarity of her own disease process, she feels she might need a transfusion by next week however.  Patient seen by primary care physician for productive cough and sore throat.  Was placed on course of oral antibiotics end of last week.  She has several more days of treatment to go.    ROS:  Answers submitted by the patient for this visit:  10/30/2022  appetite change : Yes  unexpected weight change: Yes  mouth sores: No  visual disturbance: No  cough: No  shortness of breath: No  chest pain: No  abdominal pain: No  diarrhea: No  frequency: Yes  back pain: Yes  rash: No  headaches: Yes  adenopathy: No  nervous/ anxious: No    LABS:  10/21/2022:  WBC 15.63, hemoglobin 7.4, hematocrit 21.1, platelet count 454K     PLAN:    Patient offered a transfusion at this time, but she declines.  She would like  to try and hold off until next week.  Lab another TeleMed visit in a week with CBC and CMP prior to RTC.  Patient instructed to finish her course of antibiotics for her URI symptoms.  We will refill meds today.    AMY ZAVALA M.D., FACP          .

## 2022-11-01 DIAGNOSIS — D57.1 SICKLE CELL DISEASE WITHOUT CRISIS: Primary | ICD-10-CM

## 2022-11-07 ENCOUNTER — TELEPHONE (OUTPATIENT)
Dept: HEMATOLOGY/ONCOLOGY | Facility: CLINIC | Age: 32
End: 2022-11-07

## 2022-11-07 NOTE — TELEPHONE ENCOUNTER
Please advise patient it is important for her to get labs done ASAP as her hemoglobin is low, and she may need blood transfusion if it gets lower.   Thanks. MISHA Marie

## 2022-11-07 NOTE — TELEPHONE ENCOUNTER
----- Message from Shilpi Brownlee sent at 11/7/2022  8:34 AM CST -----  Ok    ----- Message -----  From: Nadir Beverly  Sent: 11/7/2022   8:32 AM CST  To: Melissa Irizarry NP    Good morning,     PT called this morning stating that she was in pain and did not complete her reuired labs for her visit today. I asked the patient if she would be able to have the labs done before noon since her visit was at 1300. PT stated that she would not be going to do lab work and would like to be r/s. I asked the PT when she would be able to do labs so that I can schedule her follow up accordingly. PT stated she is unsure when she will be able to go do labs. She will call when she is going to do her lab work and be scheduled following that.     Thank you,  Nadir

## 2022-11-07 NOTE — TELEPHONE ENCOUNTER
----- Message from Nadir Beverly sent at 11/7/2022  8:29 AM CST -----  Good morning,     PT called this morning stating that she was in pain and did not complete her reuired labs for her visit today. I asked the patient if she would be able to have the labs done before noon since her visit was at 1300. PT stated that she would not be going to do lab work and would like to be r/s. I asked the PT when she would be able to do labs so that I can schedule her follow up accordingly. PT stated she is unsure when she will be able to go do labs. She will call when she is going to do her lab work and be scheduled following that.     Thank you,  Nadir

## 2022-11-15 DIAGNOSIS — D57.00 HB-SS DISEASE WITH CRISIS: ICD-10-CM

## 2022-11-16 RX ORDER — FENTANYL 100 UG/H
1 PATCH TRANSDERMAL
Qty: 10 PATCH | Refills: 0 | Status: SHIPPED | OUTPATIENT
Start: 2022-11-16 | End: 2022-12-22 | Stop reason: SDUPTHER

## 2022-11-28 DIAGNOSIS — D57.1 SICKLE CELL DISEASE WITHOUT CRISIS: ICD-10-CM

## 2022-11-28 RX ORDER — OXYCODONE AND ACETAMINOPHEN 10; 325 MG/1; MG/1
1 TABLET ORAL EVERY 6 HOURS PRN
Qty: 120 TABLET | Refills: 0 | Status: SHIPPED | OUTPATIENT
Start: 2022-11-28 | End: 2022-12-22 | Stop reason: SDUPTHER

## 2022-12-01 ENCOUNTER — OFFICE VISIT (OUTPATIENT)
Dept: HEMATOLOGY/ONCOLOGY | Facility: CLINIC | Age: 32
End: 2022-12-01
Payer: MEDICAID

## 2022-12-01 VITALS — WEIGHT: 179 LBS | BODY MASS INDEX: 28.04 KG/M2

## 2022-12-01 DIAGNOSIS — D57.1 SICKLE CELL DISEASE WITHOUT CRISIS: Primary | ICD-10-CM

## 2022-12-01 PROCEDURE — 1159F PR MEDICATION LIST DOCUMENTED IN MEDICAL RECORD: ICD-10-PCS | Mod: CPTII,95,, | Performed by: INTERNAL MEDICINE

## 2022-12-01 PROCEDURE — 1159F MED LIST DOCD IN RCRD: CPT | Mod: CPTII,95,, | Performed by: INTERNAL MEDICINE

## 2022-12-01 PROCEDURE — 3008F PR BODY MASS INDEX (BMI) DOCUMENTED: ICD-10-PCS | Mod: CPTII,95,, | Performed by: INTERNAL MEDICINE

## 2022-12-01 PROCEDURE — 99212 OFFICE O/P EST SF 10 MIN: CPT | Mod: 95,,, | Performed by: INTERNAL MEDICINE

## 2022-12-01 PROCEDURE — 1160F RVW MEDS BY RX/DR IN RCRD: CPT | Mod: CPTII,95,, | Performed by: INTERNAL MEDICINE

## 2022-12-01 PROCEDURE — 99212 PR OFFICE/OUTPT VISIT, EST, LEVL II, 10-19 MIN: ICD-10-PCS | Mod: 95,,, | Performed by: INTERNAL MEDICINE

## 2022-12-01 PROCEDURE — 3008F BODY MASS INDEX DOCD: CPT | Mod: CPTII,95,, | Performed by: INTERNAL MEDICINE

## 2022-12-01 PROCEDURE — 1160F PR REVIEW ALL MEDS BY PRESCRIBER/CLIN PHARMACIST DOCUMENTED: ICD-10-PCS | Mod: CPTII,95,, | Performed by: INTERNAL MEDICINE

## 2022-12-01 NOTE — PROGRESS NOTES
Established Patient - Audio Only Telehealth Visit     The patient location is:  Home  The chief complaint leading to consultation is:  Follow-up of sickle cell disease  Visit type: Virtual visit with audio only (telephone)  Total time spent with patient:  13 minutes       The reason for the audio only service rather than synchronous audio and video virtual visit was related to technical difficulties or patient preference/necessity.     Each patient to whom I provide medical services by telemedicine is:  (1) informed of the relationship between the physician and patient and the respective role of any other health care provider with respect to management of the patient; and (2) notified that they may decline to receive medical services by telemedicine and may withdraw from such care at any time. Patient verbally consented to receive this service via voice-only telephone call.    LAB:   12.01/2022: WBC 13.36, hemoglobin 7.5, hematocrit 22.6, platelet count 4 4 6.  MCV 95.8    HxPI:  Patient feels her energy level is now starting to wane but not to the point that she needs a transfusion.  As the way she feels now, she states that she might need 1 in a couple of weeks by mid December.    Answers submitted by the patient for this visit:  Review of Systems Questionnaire (Submitted on 11/30/2022)  appetite change : No  unexpected weight change: No  mouth sores: No  visual disturbance: No  cough: No  shortness of breath: No  chest pain: No  abdominal pain: No  diarrhea: No  frequency: No  back pain: No  rash: No  headaches: No  adenopathy: No  nervous/ anxious: No    ASSESSMENT:   Sickle cell disease    PLAN:  TeleMed visit in 2 weeks.  CBC prior to TeleMed visit.  She might need transfusion in mid December.    AMY ZAVALA M.D., [facility]

## 2022-12-22 DIAGNOSIS — D57.00 HB-SS DISEASE WITH CRISIS: ICD-10-CM

## 2022-12-22 DIAGNOSIS — D57.1 SICKLE CELL DISEASE WITHOUT CRISIS: ICD-10-CM

## 2022-12-23 RX ORDER — OXYCODONE AND ACETAMINOPHEN 10; 325 MG/1; MG/1
1 TABLET ORAL EVERY 6 HOURS PRN
Qty: 120 TABLET | Refills: 0 | OUTPATIENT
Start: 2022-12-27 | End: 2023-12-27

## 2022-12-23 RX ORDER — FENTANYL 100 UG/H
1 PATCH TRANSDERMAL
Qty: 10 PATCH | Refills: 0 | Status: SHIPPED | OUTPATIENT
Start: 2022-12-23 | End: 2023-01-12 | Stop reason: SDUPTHER

## 2022-12-23 RX ORDER — OXYCODONE AND ACETAMINOPHEN 10; 325 MG/1; MG/1
1 TABLET ORAL EVERY 6 HOURS PRN
Qty: 120 TABLET | Refills: 0 | Status: SHIPPED | OUTPATIENT
Start: 2022-12-27 | End: 2023-01-12 | Stop reason: SDUPTHER

## 2022-12-23 NOTE — TELEPHONE ENCOUNTER
Dinapetar stated that her percocet Rx wasn't receive at the pharmacy my [facility]. She just got out the hospital and she need a refill

## 2023-01-11 NOTE — PROGRESS NOTES
Established Patient - Audio Only Telehealth Visit     The patient location is: home   The chief complaint leading to consultation is: home  Visit type: Virtual visit with audio only (telephone)  Total time spent with patient: 15       The reason for the audio only service rather than synchronous audio and video virtual visit was related to technical difficulties or patient preference/necessity.     Each patient to whom I provide medical services by telemedicine is:  (1) informed of the relationship between the physician and patient and the respective role of any other health care provider with respect to management of the patient; and (2) notified that they may decline to receive medical services by telemedicine and may withdraw from such care at any time. Patient verbally consented to receive this service via voice-only telephone call.           HEMATOLOGY / ONCOLOGY   CLINIC NOTE     ONCOLOGICAL HISTORY:     Diagnosis:  - SCD    Treatment History:  - Folic acid, HU, Jadenu    Current Treatment:   -     Subjective:       Chief Complaint: sickle cell  (Lab results review, pt discharged from hospital [place]13 [place] [facility] pt states she had blood transfusion while in hospital, pt states she is currently in pain now )          HPI      Anya Leal  32 y.o.  female with past medical history significant for SCD here for follow-up    According to the patient she had been on folic acid, hydroxyurea and jadenu in the past.  Patient had not been taking her HU and Jadenu recently.  Patient had multiple transfusions over the year, requiring almost 10 transfusion last year.  Denies any history of exchange transfusion or acute chest syndrome.  Denies any history of avascular necrosis.  Denies any history of stroke.     Interval History:       Patient was [place] [facility] [place]le cell crisis from 12/11/2022 to 12/23/2022 and was transfused 3 units of PRBC.  Patient is still complaining of body  aches.      Past Medical History:   Diagnosis Date    Encounter for blood transfusion     Flu 04/2019    sore throat    Sickle cell anemia       Past Surgical History:   Procedure Laterality Date    MEDIPORT INSERTION, SINGLE      TUBAL LIGATION       Social History     Socioeconomic History    Marital status:    Tobacco Use    Smoking status: Never    Smokeless tobacco: Never   Substance and Sexual Activity    Alcohol use: No    Drug use: No    Sexual activity: Yes     Partners: Male      Family History   Problem Relation Age of Onset    Hypertension Mother       Review of patient's allergies indicates:   Allergen Reactions    Aspirin     Butorphanol     Compazine [prochlorperazine edisylate]     Ketorolac      Other reaction(s): other    Morpholine analogues     Nubain [nalbuphine]     Stadol [butorphanol tartrate]     Tramadol Rash     hives      Review of Systems   Constitutional:  Positive for appetite change and unexpected weight change.   HENT:  Negative for mouth sores.    Eyes:  Negative for visual disturbance.   Respiratory:  Negative for cough and shortness of breath.    Cardiovascular:  Negative for chest pain.   Gastrointestinal:  Negative for abdominal pain and diarrhea.   Genitourinary:  Negative for frequency.   Musculoskeletal:  Positive for back pain.   Integumentary:  Negative for rash.   Neurological:  Negative for headaches.   Hematological:  Negative for adenopathy.   Psychiatric/Behavioral:  The patient is not nervous/anxious.        Objective:        There were no vitals filed for this visit.     Physical Exam      LABS / IMAGING        12.01/2022: WBC 13.36, hemoglobin 7.5, hematocrit 22.6, platelet count 4 4 6.  MCV 95.8  10/21/2022:  WBC 15.63, hemoglobin 7.4, hematocrit 21.1, platelet count 454K    - 12/13/2022 bilateral hips-normal hips      Assessment:     SICKLE CELL DISEASE  - no history of acute chest syndrome, stroke, avascular necrosis  - no history of exchange  transfusion  - previously taking folic acid, hydroxyurea, Jadenu      Plan:     - restarted folic acid, hydroxyurea and jadenu with prescriptions sent to the pharmacy   - referred to ophthalmology for yearly evaluation  - echo requested  -  reviewed and prescription sent for fentanyl and Percocet   - Repeat labs on follow up: CBC, CMP, FERRITIN   - MD / LABS VISIT - 4 WEEKS     The patient was interviewed . Pertinent lab and radiology studies were reviewed. Pt instructed to call should develop concerning signs/symptoms or have further questions.       Portions of the record may have been created with voice recognition software. Occasional wrong-word or sound-a-like substitutions may have occurred due to the inherent limitations of voice recognition software. Read the chart carefully and recognize, using context, where substitutions have occurred.    Mecca Bowles MD  Hematology / Oncology                        This service was not originating from a related E/M service provided within the previous 7 days nor will  to an E/M service or procedure within the next 24 hours or my soonest available appointment.  Prevailing standard of care was able to be met in this audio-only visit.

## 2023-01-12 ENCOUNTER — OFFICE VISIT (OUTPATIENT)
Dept: HEMATOLOGY/ONCOLOGY | Facility: CLINIC | Age: 33
End: 2023-01-12
Payer: MEDICAID

## 2023-01-12 VITALS — WEIGHT: 167 LBS | BODY MASS INDEX: 26.16 KG/M2

## 2023-01-12 DIAGNOSIS — D57.1 SICKLE CELL DISEASE WITHOUT CRISIS: Primary | ICD-10-CM

## 2023-01-12 DIAGNOSIS — D57.00 HB-SS DISEASE WITH CRISIS: ICD-10-CM

## 2023-01-12 DIAGNOSIS — E83.111 IRON OVERLOAD DUE TO REPEATED RED BLOOD CELL TRANSFUSIONS: ICD-10-CM

## 2023-01-12 DIAGNOSIS — D57.00 SICKLE CELL PAIN CRISIS: ICD-10-CM

## 2023-01-12 PROCEDURE — 99214 OFFICE O/P EST MOD 30 MIN: CPT | Mod: 95,,, | Performed by: INTERNAL MEDICINE

## 2023-01-12 PROCEDURE — 3008F PR BODY MASS INDEX (BMI) DOCUMENTED: ICD-10-PCS | Mod: CPTII,95,, | Performed by: INTERNAL MEDICINE

## 2023-01-12 PROCEDURE — 1159F PR MEDICATION LIST DOCUMENTED IN MEDICAL RECORD: ICD-10-PCS | Mod: CPTII,95,, | Performed by: INTERNAL MEDICINE

## 2023-01-12 PROCEDURE — 3008F BODY MASS INDEX DOCD: CPT | Mod: CPTII,95,, | Performed by: INTERNAL MEDICINE

## 2023-01-12 PROCEDURE — 1159F MED LIST DOCD IN RCRD: CPT | Mod: CPTII,95,, | Performed by: INTERNAL MEDICINE

## 2023-01-12 PROCEDURE — 99214 PR OFFICE/OUTPT VISIT, EST, LEVL IV, 30-39 MIN: ICD-10-PCS | Mod: 95,,, | Performed by: INTERNAL MEDICINE

## 2023-01-12 RX ORDER — FENTANYL 100 UG/H
1 PATCH TRANSDERMAL
Qty: 10 PATCH | Refills: 0 | Status: SHIPPED | OUTPATIENT
Start: 2023-01-12 | End: 2023-01-12

## 2023-01-12 RX ORDER — DEFERASIROX 360 MG/1
1080 TABLET, FILM COATED ORAL DAILY
Qty: 90 TABLET | Refills: 3 | Status: SHIPPED | OUTPATIENT
Start: 2023-01-12 | End: 2023-04-19 | Stop reason: SDUPTHER

## 2023-01-12 RX ORDER — HYDROXYUREA 500 MG/1
1500 CAPSULE ORAL DAILY
Qty: 90 CAPSULE | Refills: 5 | Status: SHIPPED | OUTPATIENT
Start: 2023-01-12 | End: 2023-01-12

## 2023-01-12 RX ORDER — FOLIC ACID 1 MG/1
1 TABLET ORAL DAILY
Qty: 90 TABLET | Refills: 3 | Status: SHIPPED | OUTPATIENT
Start: 2023-01-12 | End: 2023-04-19 | Stop reason: SDUPTHER

## 2023-01-12 RX ORDER — HYDROXYUREA 500 MG/1
1500 CAPSULE ORAL DAILY
Qty: 90 CAPSULE | Refills: 5 | Status: SHIPPED | OUTPATIENT
Start: 2023-01-12 | End: 2023-04-19 | Stop reason: SDUPTHER

## 2023-01-12 RX ORDER — FOLIC ACID 1 MG/1
1 TABLET ORAL DAILY
Qty: 90 TABLET | Refills: 3 | Status: SHIPPED | OUTPATIENT
Start: 2023-01-12 | End: 2023-01-12

## 2023-01-12 RX ORDER — OXYCODONE AND ACETAMINOPHEN 10; 325 MG/1; MG/1
1 TABLET ORAL EVERY 6 HOURS PRN
Qty: 120 TABLET | Refills: 0 | Status: SHIPPED | OUTPATIENT
Start: 2023-01-12 | End: 2023-01-12

## 2023-01-12 RX ORDER — DEFERASIROX 360 MG/1
1080 TABLET, FILM COATED ORAL DAILY
Qty: 90 TABLET | Refills: 3 | Status: SHIPPED | OUTPATIENT
Start: 2023-01-12 | End: 2023-01-12

## 2023-01-12 RX ORDER — FENTANYL 100 UG/H
1 PATCH TRANSDERMAL
Qty: 10 PATCH | Refills: 0 | Status: SHIPPED | OUTPATIENT
Start: 2023-01-12 | End: 2023-02-14 | Stop reason: SDUPTHER

## 2023-01-12 RX ORDER — OXYCODONE AND ACETAMINOPHEN 10; 325 MG/1; MG/1
1 TABLET ORAL EVERY 6 HOURS PRN
Qty: 120 TABLET | Refills: 0 | Status: SHIPPED | OUTPATIENT
Start: 2023-01-12 | End: 2023-02-14 | Stop reason: SDUPTHER

## 2023-01-20 DIAGNOSIS — D57.1 SICKLE CELL DISEASE WITHOUT CRISIS: ICD-10-CM

## 2023-02-14 DIAGNOSIS — D57.00 HB-SS DISEASE WITH CRISIS: ICD-10-CM

## 2023-02-14 DIAGNOSIS — D57.1 SICKLE CELL DISEASE WITHOUT CRISIS: ICD-10-CM

## 2023-02-14 RX ORDER — FENTANYL 100 UG/H
1 PATCH TRANSDERMAL
Qty: 10 PATCH | Refills: 0 | Status: SHIPPED | OUTPATIENT
Start: 2023-02-14 | End: 2023-03-22

## 2023-02-14 RX ORDER — ONDANSETRON 4 MG/1
8 TABLET, ORALLY DISINTEGRATING ORAL 2 TIMES DAILY
Qty: 30 TABLET | Refills: 3 | Status: SHIPPED | OUTPATIENT
Start: 2023-02-14 | End: 2023-04-19 | Stop reason: SDUPTHER

## 2023-02-14 RX ORDER — OXYCODONE AND ACETAMINOPHEN 10; 325 MG/1; MG/1
1 TABLET ORAL EVERY 6 HOURS PRN
Qty: 120 TABLET | Refills: 0 | Status: SHIPPED | OUTPATIENT
Start: 2023-02-14 | End: 2023-02-20

## 2023-02-14 RX ORDER — PROMETHAZINE HYDROCHLORIDE 25 MG/1
12.5 TABLET ORAL EVERY 6 HOURS PRN
Qty: 30 TABLET | Refills: 1 | Status: SHIPPED | OUTPATIENT
Start: 2023-02-14 | End: 2023-04-19 | Stop reason: SDUPTHER

## 2023-02-20 DIAGNOSIS — D57.00 SICKLE CELL PAIN CRISIS: Primary | ICD-10-CM

## 2023-02-22 RX ORDER — OXYCODONE AND ACETAMINOPHEN 10; 325 MG/1; MG/1
1 TABLET ORAL EVERY 6 HOURS PRN
Qty: 120 TABLET | Refills: 0 | Status: SHIPPED | OUTPATIENT
Start: 2023-02-22 | End: 2023-03-20 | Stop reason: SDUPTHER

## 2023-03-16 DIAGNOSIS — D57.00 HB-SS DISEASE WITH CRISIS: ICD-10-CM

## 2023-03-16 DIAGNOSIS — D57.00 SICKLE CELL PAIN CRISIS: ICD-10-CM

## 2023-03-16 RX ORDER — OXYCODONE AND ACETAMINOPHEN 10; 325 MG/1; MG/1
1 TABLET ORAL EVERY 6 HOURS PRN
Qty: 120 TABLET | Refills: 0 | Status: CANCELLED | OUTPATIENT
Start: 2023-03-16 | End: 2024-03-15

## 2023-03-22 RX ORDER — FENTANYL 100 UG/H
1 PATCH TRANSDERMAL
Qty: 10 PATCH | Refills: 0 | OUTPATIENT
Start: 2023-03-22

## 2023-03-24 ENCOUNTER — TELEPHONE (OUTPATIENT)
Dept: HEMATOLOGY/ONCOLOGY | Facility: CLINIC | Age: 33
End: 2023-03-24

## 2023-03-24 NOTE — TELEPHONE ENCOUNTER
----- Message from Staci Smith NP sent at 3/24/2023  8:21 AM CDT -----  Please note. I will not refill pain medication for next month until patient is in clinic. Please advise patient. Thanks

## 2023-04-19 ENCOUNTER — OFFICE VISIT (OUTPATIENT)
Dept: HEMATOLOGY/ONCOLOGY | Facility: CLINIC | Age: 33
End: 2023-04-19
Payer: MEDICAID

## 2023-04-19 VITALS — HEIGHT: 67 IN | WEIGHT: 176 LBS | BODY MASS INDEX: 27.62 KG/M2

## 2023-04-19 DIAGNOSIS — D57.00 SICKLE CELL PAIN CRISIS: ICD-10-CM

## 2023-04-19 DIAGNOSIS — D57.00 HB-SS DISEASE WITH CRISIS: ICD-10-CM

## 2023-04-19 DIAGNOSIS — E83.111 IRON OVERLOAD DUE TO REPEATED RED BLOOD CELL TRANSFUSIONS: ICD-10-CM

## 2023-04-19 DIAGNOSIS — D57.00 SICKLE CELL DISEASE WITH CRISIS: Primary | ICD-10-CM

## 2023-04-19 PROCEDURE — 99214 PR OFFICE/OUTPT VISIT, EST, LEVL IV, 30-39 MIN: ICD-10-PCS | Mod: 95,,,

## 2023-04-19 PROCEDURE — 3008F BODY MASS INDEX DOCD: CPT | Mod: CPTII,95,,

## 2023-04-19 PROCEDURE — 99214 OFFICE O/P EST MOD 30 MIN: CPT | Mod: 95,,,

## 2023-04-19 PROCEDURE — 1159F MED LIST DOCD IN RCRD: CPT | Mod: CPTII,95,,

## 2023-04-19 PROCEDURE — 1159F PR MEDICATION LIST DOCUMENTED IN MEDICAL RECORD: ICD-10-PCS | Mod: CPTII,95,,

## 2023-04-19 PROCEDURE — 3008F PR BODY MASS INDEX (BMI) DOCUMENTED: ICD-10-PCS | Mod: CPTII,95,,

## 2023-04-19 RX ORDER — ONDANSETRON 4 MG/1
8 TABLET, ORALLY DISINTEGRATING ORAL 2 TIMES DAILY
Qty: 30 TABLET | Refills: 3 | Status: SHIPPED | OUTPATIENT
Start: 2023-04-19 | End: 2024-01-04 | Stop reason: SDUPTHER

## 2023-04-19 RX ORDER — HYDROXYUREA 500 MG/1
1500 CAPSULE ORAL DAILY
Qty: 90 CAPSULE | Refills: 5 | Status: SHIPPED | OUTPATIENT
Start: 2023-04-19 | End: 2023-04-19 | Stop reason: SDUPTHER

## 2023-04-19 RX ORDER — OXYCODONE AND ACETAMINOPHEN 10; 325 MG/1; MG/1
1 TABLET ORAL EVERY 6 HOURS PRN
Qty: 120 TABLET | Refills: 0 | Status: SHIPPED | OUTPATIENT
Start: 2023-04-19 | End: 2023-04-19 | Stop reason: SDUPTHER

## 2023-04-19 RX ORDER — FOLIC ACID 1 MG/1
1 TABLET ORAL DAILY
Qty: 90 TABLET | Refills: 3 | Status: SHIPPED | OUTPATIENT
Start: 2023-04-19 | End: 2023-04-19 | Stop reason: SDUPTHER

## 2023-04-19 RX ORDER — DEFERASIROX 360 MG/1
1080 TABLET, FILM COATED ORAL DAILY
Qty: 90 TABLET | Refills: 3 | Status: SHIPPED | OUTPATIENT
Start: 2023-04-19 | End: 2023-06-21 | Stop reason: SDUPTHER

## 2023-04-19 RX ORDER — FOLIC ACID 1 MG/1
1 TABLET ORAL DAILY
Qty: 90 TABLET | Refills: 3 | Status: SHIPPED | OUTPATIENT
Start: 2023-04-19 | End: 2023-06-21 | Stop reason: SDUPTHER

## 2023-04-19 RX ORDER — PROMETHAZINE HYDROCHLORIDE 25 MG/1
12.5 TABLET ORAL EVERY 6 HOURS PRN
Qty: 30 TABLET | Refills: 1 | Status: SHIPPED | OUTPATIENT
Start: 2023-04-19 | End: 2023-04-19 | Stop reason: SDUPTHER

## 2023-04-19 RX ORDER — FENTANYL 100 UG/H
PATCH TRANSDERMAL
Qty: 10 PATCH | Refills: 0 | Status: SHIPPED | OUTPATIENT
Start: 2023-04-19 | End: 2023-05-23 | Stop reason: SDUPTHER

## 2023-04-19 RX ORDER — PROMETHAZINE HYDROCHLORIDE 25 MG/1
12.5 TABLET ORAL EVERY 6 HOURS PRN
Qty: 30 TABLET | Refills: 1 | Status: SHIPPED | OUTPATIENT
Start: 2023-04-19 | End: 2023-06-21 | Stop reason: SDUPTHER

## 2023-04-19 RX ORDER — HYDROXYUREA 500 MG/1
1500 CAPSULE ORAL DAILY
Qty: 90 CAPSULE | Refills: 5 | Status: SHIPPED | OUTPATIENT
Start: 2023-04-19 | End: 2023-06-21 | Stop reason: SDUPTHER

## 2023-04-19 RX ORDER — ONDANSETRON 4 MG/1
8 TABLET, ORALLY DISINTEGRATING ORAL 2 TIMES DAILY
Qty: 30 TABLET | Refills: 3 | Status: SHIPPED | OUTPATIENT
Start: 2023-04-19 | End: 2023-04-19 | Stop reason: SDUPTHER

## 2023-04-19 RX ORDER — DEFERASIROX 360 MG/1
1080 TABLET, FILM COATED ORAL DAILY
Qty: 90 TABLET | Refills: 3 | Status: SHIPPED | OUTPATIENT
Start: 2023-04-19 | End: 2023-04-19 | Stop reason: SDUPTHER

## 2023-04-19 RX ORDER — OXYCODONE AND ACETAMINOPHEN 10; 325 MG/1; MG/1
1 TABLET ORAL EVERY 6 HOURS PRN
Qty: 120 TABLET | Refills: 0 | Status: SHIPPED | OUTPATIENT
Start: 2023-04-19 | End: 2023-05-23 | Stop reason: SDUPTHER

## 2023-04-19 RX ORDER — FENTANYL 100 UG/H
PATCH TRANSDERMAL
Qty: 10 PATCH | Refills: 0 | Status: SHIPPED | OUTPATIENT
Start: 2023-04-19 | End: 2023-04-19 | Stop reason: SDUPTHER

## 2023-04-19 NOTE — PROGRESS NOTES
Established Patient - Audio Only Telehealth Visit     The patient location is: Home  The chief complaint leading to consultation is: Sickle cell  Visit type: Virtual visit with audio only (telephone)  Total time spent with patient: 13 minutes      Established Patient - Audio Only Telehealth Visit     The reason for the audio only service rather than synchronous audio and video virtual visit was related to technical difficulties or patient preference/necessity.     Each patient to whom I provide medical services by telemedicine is:  (1) informed of the relationship between the physician and patient and the respective role of any other health care provider with respect to management of the patient; and (2) notified that they may decline to receive medical services by telemedicine and may withdraw from such care at any time. Patient verbally consented to receive this service via voice-only telephone call.    Diagnosis:  - SCD    Treatment History:  - Folic acid, Hydrea, Jadenu    Current Treatment:   - hydrea 1500 mg daily  - Jadenu 1080 mg daily  - Folic acid 1 mg daily        Subjective:       Chief Complaint: Weakness (Gets weak a lot and blacks, she states when she gets weak she has to sit fast so she does not fall, had 2 falls and states this happens a lot and not sure what is causing it and she feels when it is about to come on ) and Pain (Pain in lower back and legs )          HPI      Anya Leal  33 y.o.  female with past medical history significant for SCD here for follow-up    According to the patient she had been on folic acid, hydroxyurea and jadenu in the past.  Patient had not been taking her HU and Jadenu recently.  Patient had multiple transfusions over the year, requiring almost 10 transfusion last year.  Denies any history of exchange transfusion or acute chest syndrome.  Denies any history of avascular necrosis.  Denies any history of stroke. No ACS ever in her life never intubated  Never full exchange transfusions >20 lifetime transfusions, in 2019 pt reports ~10 units of PRBC overall  No AVN  Never told she has iron overload and has never been on iron chelation  No strokes  Not on any pediatric transfusion regimen  >10 pain crisis per year (occur almost with every cycle requiring frequent hospital visits)  hospitalization 2/2021, had partial exchange of 1 unit  Most recent hospitalization 3/2022, did not receive any blood transfusions         Interval History:   Today 04/19/23: Patient via telephone today for follow up visit. She has not taken hydrea, jadenu and folic acid due to prescription expiring. She has cancelled several clinic visits. She continues with increased pain during her menstrual cycles. She continues using fentanyl patches and takes percocet PRN breakthrough pain. We discussed compliance with OV appts and medication.  Today she is concerned over symptoms of severe weakness, dizziness, and lower back pain (may be related to menstral cramping). She continues to decline adakveo. She has not scheduled follow up with ophthalmology. She denies fevers, chills, CP, melena, hematochezia, constipation or any other complaints. No other complaints or concerns reported.        Past Medical History:   Diagnosis Date    Encounter for blood transfusion     Flu 04/2019    sore throat    Sickle cell anemia       Past Surgical History:   Procedure Laterality Date    MEDIPORT INSERTION, SINGLE      TUBAL LIGATION       Social History     Socioeconomic History    Marital status:    Tobacco Use    Smoking status: Never    Smokeless tobacco: Never   Substance and Sexual Activity    Alcohol use: No    Drug use: No    Sexual activity: Yes     Partners: Male      Family History   Problem Relation Age of Onset    Hypertension Mother       Review of patient's allergies indicates:   Allergen Reactions    Aspirin     Butorphanol     Compazine [prochlorperazine edisylate]     Ketorolac      Other  reaction(s): other    Morpholine analogues     Nubain [nalbuphine]     Stadol [butorphanol tartrate]     Tramadol Rash     hives      Review of Systems   Constitutional:  Positive for appetite change and unexpected weight change.   HENT:  Negative for mouth sores.    Eyes:  Negative for visual disturbance.   Respiratory:  Negative for cough and shortness of breath.    Cardiovascular:  Negative for chest pain.   Gastrointestinal:  Negative for abdominal pain and diarrhea.   Genitourinary:  Negative for frequency.   Musculoskeletal:  Positive for back pain.   Integumentary:  Negative for rash.   Neurological:  Negative for headaches.   Hematological:  Negative for adenopathy.   Psychiatric/Behavioral:  The patient is not nervous/anxious.        Objective:        There were no vitals filed for this visit.     Physical Exam      No PE or VS for telephone visit    LABS / IMAGING      Other labs reviewed, Hg ranged 7-10, normal Cr  9/5/17 Hg A 9.3 Hg A2 3.8 Hg F 3.6 Hg S 83.3  6/3/2020 hgb 9.5, WBC 10.7, , MCV 96.7, RDW 18.4 Hg A 30.6 Hg F 5.2% Hg S 60.7%  8/10/20 Hg F 7.8% Hg A 10.8% Hg S 77.4% ferritin 2997 Cr 0.64 Hg 9 .3  ANC 7.9  10/5/20 Cr 0.63, Tbili 2.4, , Ferritin 2556, WBC 8.23, Hgb 8.9, .5, , retic 8.41, ANC 4.78, Urine Protein 11.56, Hgb A 0% A21 3.8% F 8.9% S 83.5%  11/23/20 Cr 0.69, TP 8.5, ALB 4.6, alk phos 77, T bili 2.2, , iron 151, TIBC 194, iron sat 78%, ferritin 2746, WBC 8.47, Hgb 9.2, , retic 26K, ANC 5.69, urine protein 40.21, Hgb F 10.2, Hgb S 83.8  1/27/21 Hg A 0% A2 3.8 F 11.3% S 82.5% Other 2.4% Cr 0.61 Alb 4.3 TP 7.8 Tbili 2.1 AST 19 ALT 10  Iron 128 Ferritin 2218 WBC 9.87 Hg 9.1 .2  ANC 6.6 Abs retic 125k urine protein 12.17 mg/dl  3/31/21 Cr 0.69, TP 8.1, T bili 2.2, , Ferritin 2166, Folic acid 19.68, WBC 8.23, Hgb 8.0, .3, , ANC 3.90, abs retic ~175K, urine protein neg, urine culture neg, Hgb A  0%, Hgb A2 4.1%, Hgb F 11.7%, Hgb S 80.5% other 3.7%  8/23/21 WBC 9.59, Hgb 7.9, .4, , Cr 0.74, Tbili 1.5, , Folic acid 10.48, Ferritin 1460, UA protein neg, Hgb A2 3.5% F 10.8% S 82.6%  10/15/21 Hg A2 3.2% Hg F 9.2% Hg S 84.5% Cr 0.67 Alb 4.5 Tbili 1.8  Ferritin 1850 Folate 11.45 WBC 10.47 Hg 8.5  Retic 192k UA protein neg  12/8/21 Hg A 18.1% A2 3.1% F 7.7% S 71.1%, , Ferritin 1654, Folic acid 13.44, WBC 10.39, Hgb 8.1, PLT 42, Retic 6.13%, Urine protein neg  2/3/22 Cr 0.75 Alb 4.7 TP 8.2 Tbili 2.9  Ferritin 1729 Folate 15.9 WBC 11.75 Hg 7.4 RBC 2.25 MCV 97.8 RDW 17.9  Retic 5.85% Urine protein neg Hg A 10.6% Hg F 8.3% Hg S 78.1% Hg A2 3%  4/25/22 Cr 0.77, Alb 4.5, TP 7.8, Tbili 1.8, , Ferritin 1584, folic acid 15, WBC 9.90, Hgb 8.2, , ANC 3.77, urine protein 10.47 mg/dL, hgb electrophoresis pending**  - 10/21/2022:  WBC 15.63, hemoglobin 7.4, hematocrit 21.1, platelet count 454K  - 12/01/2022: WBC 13.36, hemoglobin 7.5, hematocrit 22.6, platelet count 4 4 6.  MCV 95.8    04/18/23: Chloride 109, cr 0.71, alb 4.5, ca 9.5, TB 2.4, , LFTs WNL, Ferritin 1457, uric acid 7.2, wbc 11.87, hgb 8.0, plt 508, ANC 4.51, retic 9.39,     Imaging:  reviewed, X-Rays of b/l hips 11/2019 no AVN, CXR unremarkable    Assessment:   1. Sickle cell disease D57.1 C/w hydrea 1500mg daily, Hg F not at goal on most recent lab work, Hgb electrophoresis obtained today still pending  C/w folic acid 1mg daily  Adequate PO hydration  Analgesia with fentanyl and percocet 10mg as needed. C/w fentanyl 100mcg/hr, I advised her to use fentanyl consistently. I also advised her to use PRN motrin 400-600mg as needed  Avoid overtransfusion. Only transfuse if symptomatic anemia. Do not transfuse for simple pain crisis  Iron overload is noted. She had upset stomach previously with iron chelation. Initiated jadenu at 360mg 1 tab daily 8/2020, increased to 720mg and then increased to  1080mg on 12/4/20. She states she has been taking 3 tabs (1080mg) daily, will attempt to increase to 4 tabs (1440mg daily)  Of note, she did receive 3 days of Desferal and was transfused 1 unit PRBC during hospitalization at [facility] 11/2021.  Desferal x 10 days planned for 3/2022 but patient decided against Desferal, will continue to monitor ferritin closely and will c/w Jadenu as above  C/w zofran and Phenergan PRN nausea, Phenergan refilled today  Obtain hg electrophoresis, ferritin, iron, tibc, CBC w/ diff, CMP, folate, retic, UA for urine protein 1 week prior to return visit  Unknown prior vaccinations. She believes she last got PNA vaccinations ~5 years ago.  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  - Continue folic acid 1 mg daily,  - Continue hydroxyurea 1500 daily  - Continue jadenu 1080 daily  - all above refilled today  - C/w antinausea meds   - Echo scheduled for end of month  - referral resent for ophthalmology   -  reviewed and prescription sent for fentanyl and Percocet  - Repeat labs on follow up including vit D  - RTC 4 WEEKS            This service was not originating from a related E/M service provided within the previous 7 days nor will  to an E/M service or procedure within the next 24 hours or my soonest available appointment.  Prevailing standard of care was able to be met in this audio-only visit.

## 2023-04-19 NOTE — TELEPHONE ENCOUNTER
Can you please resend these medications, insurance called and they are only covered under you and will not be covered under [facility]PI.     Thank you

## 2023-05-17 DIAGNOSIS — D57.1 SICKLE CELL DISEASE WITHOUT CRISIS: ICD-10-CM

## 2023-05-23 ENCOUNTER — OFFICE VISIT (OUTPATIENT)
Dept: HEMATOLOGY/ONCOLOGY | Facility: CLINIC | Age: 33
End: 2023-05-23
Payer: MEDICAID

## 2023-05-23 VITALS
DIASTOLIC BLOOD PRESSURE: 92 MMHG | BODY MASS INDEX: 26.32 KG/M2 | HEIGHT: 67 IN | HEART RATE: 109 BPM | OXYGEN SATURATION: 98 % | TEMPERATURE: 99 F | WEIGHT: 167.69 LBS | SYSTOLIC BLOOD PRESSURE: 137 MMHG | RESPIRATION RATE: 18 BRPM

## 2023-05-23 DIAGNOSIS — R63.0 DECREASED APPETITE: ICD-10-CM

## 2023-05-23 DIAGNOSIS — D50.0 ANEMIA DUE TO CHRONIC BLOOD LOSS: ICD-10-CM

## 2023-05-23 DIAGNOSIS — D57.1 SICKLE CELL DISEASE WITHOUT CRISIS: ICD-10-CM

## 2023-05-23 DIAGNOSIS — D57.00 SICKLE CELL PAIN CRISIS: ICD-10-CM

## 2023-05-23 DIAGNOSIS — E83.111 IRON OVERLOAD DUE TO REPEATED RED BLOOD CELL TRANSFUSIONS: Primary | ICD-10-CM

## 2023-05-23 DIAGNOSIS — D57.00 HB-SS DISEASE WITH CRISIS: ICD-10-CM

## 2023-05-23 PROCEDURE — 1159F MED LIST DOCD IN RCRD: CPT | Mod: CPTII,,, | Performed by: INTERNAL MEDICINE

## 2023-05-23 PROCEDURE — 3075F PR MOST RECENT SYSTOLIC BLOOD PRESS GE 130-139MM HG: ICD-10-PCS | Mod: CPTII,,, | Performed by: INTERNAL MEDICINE

## 2023-05-23 PROCEDURE — 3008F BODY MASS INDEX DOCD: CPT | Mod: CPTII,,, | Performed by: INTERNAL MEDICINE

## 2023-05-23 PROCEDURE — 99214 OFFICE O/P EST MOD 30 MIN: CPT | Mod: ,,, | Performed by: INTERNAL MEDICINE

## 2023-05-23 PROCEDURE — 3080F DIAST BP >= 90 MM HG: CPT | Mod: CPTII,,, | Performed by: INTERNAL MEDICINE

## 2023-05-23 PROCEDURE — 3080F PR MOST RECENT DIASTOLIC BLOOD PRESSURE >= 90 MM HG: ICD-10-PCS | Mod: CPTII,,, | Performed by: INTERNAL MEDICINE

## 2023-05-23 PROCEDURE — 3075F SYST BP GE 130 - 139MM HG: CPT | Mod: CPTII,,, | Performed by: INTERNAL MEDICINE

## 2023-05-23 PROCEDURE — 3008F PR BODY MASS INDEX (BMI) DOCUMENTED: ICD-10-PCS | Mod: CPTII,,, | Performed by: INTERNAL MEDICINE

## 2023-05-23 PROCEDURE — 99214 PR OFFICE/OUTPT VISIT, EST, LEVL IV, 30-39 MIN: ICD-10-PCS | Mod: ,,, | Performed by: INTERNAL MEDICINE

## 2023-05-23 PROCEDURE — 1159F PR MEDICATION LIST DOCUMENTED IN MEDICAL RECORD: ICD-10-PCS | Mod: CPTII,,, | Performed by: INTERNAL MEDICINE

## 2023-05-23 RX ORDER — FENTANYL 100 UG/H
PATCH TRANSDERMAL
Qty: 10 PATCH | Refills: 0 | Status: SHIPPED | OUTPATIENT
Start: 2023-05-23 | End: 2023-06-21 | Stop reason: SDUPTHER

## 2023-05-23 RX ORDER — OXYCODONE AND ACETAMINOPHEN 10; 325 MG/1; MG/1
1 TABLET ORAL EVERY 6 HOURS PRN
Qty: 120 TABLET | Refills: 0 | Status: SHIPPED | OUTPATIENT
Start: 2023-05-23 | End: 2023-06-21 | Stop reason: SDUPTHER

## 2023-05-23 RX ORDER — CYPROHEPTADINE HYDROCHLORIDE 4 MG/1
4 TABLET ORAL 3 TIMES DAILY PRN
Qty: 90 TABLET | Refills: 1 | Status: SHIPPED | OUTPATIENT
Start: 2023-05-23 | End: 2024-02-01 | Stop reason: SDUPTHER

## 2023-05-23 RX ORDER — TAPENTADOL HYDROCHLORIDE 75 MG/1
TABLET, FILM COATED ORAL
Qty: 120 TABLET | Refills: 0 | Status: SHIPPED | OUTPATIENT
Start: 2023-05-23 | End: 2023-06-21

## 2023-05-23 NOTE — PROGRESS NOTES
Diagnosis:  - SCD    Treatment History:  - Folic acid, Hydrea, Jadenu    Current Treatment:   - hydrea 1500 mg daily  - Jadenu 1080 mg daily  - Folic acid 1 mg daily        Subjective:       Chief Complaint: No chief complaint on file.    [facility]  Anya Leal  33 y.o.  female with past medical history significant for SCD here for follow-up  According to the patient she had been on folic acid, hydroxyurea and jadenu in the past.  Patient had not been taking her HU and Jadenu recently.  Patient had multiple transfusions over the year, requiring almost 10 transfusion last year.  Denies any history of exchange transfusion or acute chest syndrome.  Denies any history of avascular necrosis.  Denies any history of stroke. No ACS ever in her life never intubated Never full exchange transfusions >20 lifetime transfusions, in 2019 pt reports ~10 units of PRBC overall  No AVN  Never told she has iron overload and has never been on iron chelation  No strokes  Not on any pediatric transfusion regimen  >10 pain crisis per year (occur almost with every cycle requiring frequent hospital visits)  hospitalization 2/2021, had partial exchange of 1 unit  Most recent hospitalization 3/2022, did not receive any blood transfusions         Interval History:   Today 5/23/23. Last pain crisis in December (though she does feel bad around her menstrual cycle). Prior to Hydrea she states that she was having every 2 months. Last transfusion was in December. Requires a transfusion today for Hgb 6.9, orders written but she did not show up for type and cross, stated would do so tomorrow. Despite claiming compliant with Jadenu, her ferritin is a little higher.   04/19/23: Patient via telephone today for follow up visit. She has not taken hydrea, jadenu and folic acid due to prescription expiring. She has cancelled several clinic visits. She continues with increased pain during her menstrual cycles. She continues using fentanyl patches  and takes percocet PRN breakthrough pain. We discussed compliance with OV appts and medication.  Today she is concerned over symptoms of severe weakness, dizziness, and lower back pain (may be related to menstral cramping). She continues to decline adakveo. She has not scheduled follow up with ophthalmology. She denies fevers, chills, CP, melena, hematochezia, constipation or any other complaints. No other complaints or concerns reported.    Past Medical History:   Diagnosis Date    Encounter for blood transfusion     Flu 04/2019    sore throat    Sickle cell anemia       Past Surgical History:   Procedure Laterality Date    MEDIPORT INSERTION, SINGLE      TUBAL LIGATION       Social History     Socioeconomic History    Marital status:    Tobacco Use    Smoking status: Never    Smokeless tobacco: Never   Substance and Sexual Activity    Alcohol use: No    Drug use: No    Sexual activity: Yes     Partners: Male      Family History   Problem Relation Age of Onset    Hypertension Mother       Review of patient's allergies indicates:   Allergen Reactions    Aspirin     Butorphanol     Compazine [prochlorperazine edisylate]     Ketorolac      Other reaction(s): other    Morpholine analogues     Nubain [nalbuphine]     Stadol [butorphanol tartrate]     Tramadol Rash     hives      Review of Systems   Constitutional:  Positive for appetite change and unexpected weight change.   HENT:  Negative for mouth sores.    Eyes:  Negative for visual disturbance.   Respiratory:  Negative for cough and shortness of breath.    Cardiovascular:  Negative for chest pain.   Gastrointestinal:  Negative for abdominal pain and diarrhea.   Genitourinary:  Negative for frequency.   Musculoskeletal:  Positive for back pain.   Integumentary:  Negative for rash.   Neurological:  Negative for headaches.   Hematological:  Negative for adenopathy.   Psychiatric/Behavioral:  The patient is not nervous/anxious.        Objective:        There  were no vitals filed for this visit.     Physical Exam      No PE or VS for telephone visit    LABS / IMAGING      Other labs reviewed, Hg ranged 7-10, normal Cr  9/5/17 Hg A 9.3 Hg A2 3.8 Hg F 3.6 Hg S 83.3  6/3/2020 hgb 9.5, WBC 10.7, , MCV 96.7, RDW 18.4 Hg A 30.6 Hg F 5.2% Hg S 60.7%  8/10/20 Hg F 7.8% Hg A 10.8% Hg S 77.4% ferritin 2997 Cr 0.64 Hg 9 .3  ANC 7.9  10/5/20 Cr 0.63, Tbili 2.4, , Ferritin 2556, WBC 8.23, Hgb 8.9, .5, , retic 8.41, ANC 4.78, Urine Protein 11.56, Hgb A 0% A21 3.8% F 8.9% S 83.5%  11/23/20 Cr 0.69, TP 8.5, ALB 4.6, alk phos 77, T bili 2.2, , iron 151, TIBC 194, iron sat 78%, ferritin 2746, WBC 8.47, Hgb 9.2, , retic 26K, ANC 5.69, urine protein 40.21, Hgb F 10.2, Hgb S 83.8  1/27/21 Hg A 0% A2 3.8 F 11.3% S 82.5% Other 2.4% Cr 0.61 Alb 4.3 TP 7.8 Tbili 2.1 AST 19 ALT 10  Iron 128 Ferritin 2218 WBC 9.87 Hg 9.1 .2  ANC 6.6 Abs retic 125k urine protein 12.17 mg/dl  3/31/21 Cr 0.69, TP 8.1, T bili 2.2, , Ferritin 2166, Folic acid 19.68, WBC 8.23, Hgb 8.0, .3, , ANC 3.90, abs retic ~175K, urine protein neg, urine culture neg, Hgb A 0%, Hgb A2 4.1%, Hgb F 11.7%, Hgb S 80.5% other 3.7%  8/23/21 WBC 9.59, Hgb 7.9, .4, , Cr 0.74, Tbili 1.5, , Folic acid 10.48, Ferritin 1460, UA protein neg, Hgb A2 3.5% F 10.8% S 82.6%  10/15/21 Hg A2 3.2% Hg F 9.2% Hg S 84.5% Cr 0.67 Alb 4.5 Tbili 1.8  Ferritin 1850 Folate 11.45 WBC 10.47 Hg 8.5  Retic 192k UA protein neg  12/8/21 Hg A 18.1% A2 3.1% F 7.7% S 71.1%, , Ferritin 1654, Folic acid 13.44, WBC 10.39, Hgb 8.1, PLT 42, Retic 6.13%, Urine protein neg  2/3/22 Cr 0.75 Alb 4.7 TP 8.2 Tbili 2.9  Ferritin 1729 Folate 15.9 WBC 11.75 Hg 7.4 RBC 2.25 MCV 97.8 RDW 17.9  Retic 5.85% Urine protein neg Hg A 10.6% Hg F 8.3% Hg S 78.1% Hg A2 3%  4/25/22 Cr 0.77, Alb 4.5, TP 7.8, Tbili 1.8, , Ferritin 1584,  folic acid 15, WBC 9.90, Hgb 8.2, , ANC 3.77, urine protein 10.47 mg/dL, hgb electrophoresis pending**  - 10/21/2022:  WBC 15.63, hemoglobin 7.4, hematocrit 21.1, platelet count 454K  - 12/01/2022: WBC 13.36, hemoglobin 7.5, hematocrit 22.6, platelet count 4 4 6.  MCV 95.8    -04/18/23: Chloride 109, cr 0.71, alb 4.5, ca 9.5, TB 2.4, , LFTs WNL, Ferritin 1457, uric acid 7.2, wbc 11.87, hgb 8.0, plt 508, ANC 4.51, retic 9.39,   -5/19/23 CMP WNL except bili 2.9, , ferritin 1467, folate 16, UA 8.1 vit D 4, , WBC 15.87, HGB 6.9, HCT 20,  , retic 9.46,   Imaging:  reviewed, X-Rays of b/l hips 11/2019 no AVN, CXR unremarkable    Assessment:   1. Sickle cell disease D57.1 C/w hydrea 1500mg daily, Hg F not at goal on most recent lab work, Hgb electrophoresis obtained today still pending  C/w folic acid 1mg daily  Adequate PO hydration  Analgesia with fentanyl and percocet 10mg as needed. C/w fentanyl 100mcg/hr, I advised her to use fentanyl consistently. I also advised her to use PRN motrin 400-600mg as needed  Avoid overtransfusion. Only transfuse if symptomatic anemia. Do not transfuse for simple pain crisis  Iron overload is noted. She had upset stomach previously with iron chelation. Initiated jadenu at 360mg 1 tab daily 8/2020, increased to 720mg and then increased to 1080mg on 12/4/20. She states she has been taking 3 tabs (1080mg) daily, will attempt to increase to 4 tabs (1440mg daily)  Of note, she did receive 3 days of Desferal and was transfused 1 unit PRBC during hospitalization at [facility] 11/2021.  Desferal x 10 days planned for 3/2022 but patient decided against Desferal, will continue to monitor ferritin closely and will c/w Jadenu as above  C/w zofran and Phenergan PRN nausea, Phenergan refilled today  Obtain hg electrophoresis, ferritin, iron, tibc, CBC w/ diff, CMP, folate, retic, UA for urine protein 1 week prior to return visit  Unknown prior vaccinations. She believes she last  got PNA vaccinations ~5 years ago.  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  - Continue folic acid 1 mg daily,  - Continue hydroxyurea 1500 daily  - Continue jadenu 1080 daily  -transfuse one unit of blood  - C/w antinausea meds   -  reviewed and prescription sent for fentanyl and Percocet and nucynta  - Repeat labs on follow up including vit D- RTC 4 WEEKS

## 2023-06-05 DIAGNOSIS — D57.1 SICKLE CELL DISEASE WITHOUT CRISIS: ICD-10-CM

## 2023-06-05 NOTE — TELEPHONE ENCOUNTER
Rx was sent in by Dr. Nguyen on 5/23 [facility] reports insurance will not let any provider fill this medication except Dr. Hdz or Staci Polo NP.--SC, LPN

## 2023-06-21 ENCOUNTER — OFFICE VISIT (OUTPATIENT)
Dept: HEMATOLOGY/ONCOLOGY | Facility: CLINIC | Age: 33
End: 2023-06-21
Payer: MEDICAID

## 2023-06-21 VITALS
TEMPERATURE: 99 F | HEART RATE: 93 BPM | DIASTOLIC BLOOD PRESSURE: 80 MMHG | OXYGEN SATURATION: 96 % | HEIGHT: 67 IN | RESPIRATION RATE: 18 BRPM | WEIGHT: 164.5 LBS | BODY MASS INDEX: 25.82 KG/M2 | SYSTOLIC BLOOD PRESSURE: 122 MMHG

## 2023-06-21 DIAGNOSIS — D57.00 SICKLE CELL PAIN CRISIS: ICD-10-CM

## 2023-06-21 DIAGNOSIS — E83.111 IRON OVERLOAD DUE TO REPEATED RED BLOOD CELL TRANSFUSIONS: ICD-10-CM

## 2023-06-21 DIAGNOSIS — D57.00 HB-SS DISEASE WITH CRISIS: Primary | ICD-10-CM

## 2023-06-21 DIAGNOSIS — Z51.5 PALLIATIVE CARE ENCOUNTER: ICD-10-CM

## 2023-06-21 PROCEDURE — 1159F MED LIST DOCD IN RCRD: CPT | Mod: CPTII,,, | Performed by: STUDENT IN AN ORGANIZED HEALTH CARE EDUCATION/TRAINING PROGRAM

## 2023-06-21 PROCEDURE — 3079F DIAST BP 80-89 MM HG: CPT | Mod: CPTII,,, | Performed by: STUDENT IN AN ORGANIZED HEALTH CARE EDUCATION/TRAINING PROGRAM

## 2023-06-21 PROCEDURE — 3008F BODY MASS INDEX DOCD: CPT | Mod: CPTII,,, | Performed by: STUDENT IN AN ORGANIZED HEALTH CARE EDUCATION/TRAINING PROGRAM

## 2023-06-21 PROCEDURE — 3079F PR MOST RECENT DIASTOLIC BLOOD PRESSURE 80-89 MM HG: ICD-10-PCS | Mod: CPTII,,, | Performed by: STUDENT IN AN ORGANIZED HEALTH CARE EDUCATION/TRAINING PROGRAM

## 2023-06-21 PROCEDURE — 99215 PR OFFICE/OUTPT VISIT, EST, LEVL V, 40-54 MIN: ICD-10-PCS | Mod: ,,, | Performed by: STUDENT IN AN ORGANIZED HEALTH CARE EDUCATION/TRAINING PROGRAM

## 2023-06-21 PROCEDURE — 1159F PR MEDICATION LIST DOCUMENTED IN MEDICAL RECORD: ICD-10-PCS | Mod: CPTII,,, | Performed by: STUDENT IN AN ORGANIZED HEALTH CARE EDUCATION/TRAINING PROGRAM

## 2023-06-21 PROCEDURE — 3074F SYST BP LT 130 MM HG: CPT | Mod: CPTII,,, | Performed by: STUDENT IN AN ORGANIZED HEALTH CARE EDUCATION/TRAINING PROGRAM

## 2023-06-21 PROCEDURE — 99215 OFFICE O/P EST HI 40 MIN: CPT | Mod: ,,, | Performed by: STUDENT IN AN ORGANIZED HEALTH CARE EDUCATION/TRAINING PROGRAM

## 2023-06-21 PROCEDURE — 3074F PR MOST RECENT SYSTOLIC BLOOD PRESSURE < 130 MM HG: ICD-10-PCS | Mod: CPTII,,, | Performed by: STUDENT IN AN ORGANIZED HEALTH CARE EDUCATION/TRAINING PROGRAM

## 2023-06-21 PROCEDURE — 3008F PR BODY MASS INDEX (BMI) DOCUMENTED: ICD-10-PCS | Mod: CPTII,,, | Performed by: STUDENT IN AN ORGANIZED HEALTH CARE EDUCATION/TRAINING PROGRAM

## 2023-06-21 RX ORDER — FENTANYL 100 UG/H
PATCH TRANSDERMAL
Qty: 10 PATCH | Refills: 0 | Status: SHIPPED | OUTPATIENT
Start: 2023-06-21 | End: 2023-07-19 | Stop reason: SDUPTHER

## 2023-06-21 RX ORDER — FOLIC ACID 1 MG/1
1 TABLET ORAL DAILY
Qty: 90 TABLET | Refills: 3 | Status: SHIPPED | OUTPATIENT
Start: 2023-06-21 | End: 2023-07-31 | Stop reason: SDUPTHER

## 2023-06-21 RX ORDER — HYDROXYUREA 500 MG/1
1500 CAPSULE ORAL DAILY
Qty: 90 CAPSULE | Refills: 5 | Status: SHIPPED | OUTPATIENT
Start: 2023-06-21 | End: 2023-10-04 | Stop reason: SDUPTHER

## 2023-06-21 RX ORDER — OXYCODONE AND ACETAMINOPHEN 10; 325 MG/1; MG/1
1 TABLET ORAL EVERY 6 HOURS PRN
Qty: 120 TABLET | Refills: 0 | Status: SHIPPED | OUTPATIENT
Start: 2023-06-21 | End: 2023-07-19 | Stop reason: SDUPTHER

## 2023-06-21 RX ORDER — ONDANSETRON 4 MG/1
8 TABLET, FILM COATED ORAL EVERY 8 HOURS PRN
Qty: 30 TABLET | Refills: 3 | Status: SHIPPED | OUTPATIENT
Start: 2023-06-21 | End: 2023-07-31 | Stop reason: SDUPTHER

## 2023-06-21 RX ORDER — PROMETHAZINE HYDROCHLORIDE 25 MG/1
12.5 TABLET ORAL EVERY 6 HOURS PRN
Qty: 30 TABLET | Refills: 1 | Status: SHIPPED | OUTPATIENT
Start: 2023-06-21 | End: 2023-07-31 | Stop reason: SDUPTHER

## 2023-06-21 RX ORDER — DEFERASIROX 360 MG/1
1080 TABLET, FILM COATED ORAL DAILY
Qty: 90 TABLET | Refills: 3 | Status: SHIPPED | OUTPATIENT
Start: 2023-06-21 | End: 2023-07-31 | Stop reason: SDUPTHER

## 2023-06-21 NOTE — PROGRESS NOTES
Diagnosis:  - SCD    Treatment History:  - Folic acid, Hydrea, Jadenu    Current Treatment:   - hydrea 1500 mg daily  - Jadenu 1080 mg daily  - Folic acid 1 mg daily        Subjective:       Chief Complaint: Follow-up    HPI  Anya Leal  33 y.o.  female with past medical history significant for SCD here for follow-up  According to the patient she had been on folic acid, hydroxyurea and jadenu in the past.  Patient had not been taking her HU and Jadenu recently.  Patient had multiple transfusions over the year, requiring almost 10 transfusion last year.  Denies any history of exchange transfusion or acute chest syndrome.  Denies any history of avascular necrosis.  Denies any history of stroke. No ACS ever in her life never intubated Never full exchange transfusions >20 lifetime transfusions, in 2019 pt reports ~10 units of PRBC overall  No AVN  Never told she has iron overload and has never been on iron chelation prior to initial presentation  No strokes  Not on any pediatric transfusion regimen  >10 pain crisis per year (occur almost with every cycle requiring frequent hospital visits)  hospitalization 2/2021, had partial exchange of 1 unit  Most recent hospitalization 3/2022, did not receive any blood transfusions         Interval History:   Today, 06/21/2023, patient denies any acute concerns since last follow-up visit with us.  She does report an ER visit with uncontrolled pain since last follow-up during which she was treated for UTI.  She denies any further ER/hospital visits.  She denies any fevers, chills.  Patient does report decreased appetite weight loss however she is grieving from her brother's death and attributes it to it.      Past Medical History:   Diagnosis Date    Encounter for blood transfusion     Flu 04/2019    sore throat    Sickle cell anemia       Past Surgical History:   Procedure Laterality Date    MEDIPORT INSERTION, SINGLE      TUBAL LIGATION       Social History      Socioeconomic History    Marital status:    Tobacco Use    Smoking status: Never    Smokeless tobacco: Never   Substance and Sexual Activity    Alcohol use: No    Drug use: No    Sexual activity: Yes     Partners: Male      Family History   Problem Relation Age of Onset    Hypertension Mother       Review of patient's allergies indicates:   Allergen Reactions    Aspirin     Butorphanol     Compazine [prochlorperazine edisylate]     Ketorolac      Other reaction(s): other    Morpholine analogues     Nubain [nalbuphine]     Stadol [butorphanol tartrate]     Tramadol Rash     hives      CONSTITUTIONAL: no fevers, no chills, no weight loss, no fatigue, no weakness  HEMATOLOGIC: no abnormal bleeding, no abnormal bruising, no drenching night sweats  ONCOLOGIC: no new masses or lumps  HEENT: no vision loss, no tinnitus or hearing loss, no nose bleeding, no dysphagia, no odynophagia  CVS: no chest pain, no palpitations, no dyspnea on exertion  RESP: no shortness of breath, no hemoptysis, no cough  GI: no nausea, no vomiting, no diarrhea, no constipation, no melena, no hematochezia, no hematemesis, no abdominal pain, no increase in abdominal girth  : no dysuria, no hematuria, no hesitancy, no scrotal swelling, no discharge  INTEGUMENT: no rashes, no abnormal bruising, no nail pitting, no hyperpigmentation  NEURO: no falls, no memory loss, no paresthesias or dysesthesias, no urofecal incontinence or retention, no loss of strength on any extremity  MSK: no back pain, no new joint pain, no joint swelling  PSYCH: no suicidal or homicidal ideation, no depression, no insomnia, no anhedonia  ENDOCRINE: no heat or cold intolerance, no polyuria, no polydipsia        Objective:        Vitals:    06/21/23 1336   BP: 122/80   Pulse: 93   Resp: 18   Temp: 99.3 °F (37.4 °C)        ECOG PS 0  GA: AAOx3, NAD  HEENT:  Scleral icterus EOMI, good dentition, moist oral mucous membranes  LYMPH: no cervical, axillary or  supraclavicular adenopathy  CVS: s1s2 RRR, no M/R/G, port in place without surrounding edema or erythema  RESP: CTA b/l, no crackles, no wheezes or rhonchi  ABD: soft, NT, ND, BS+, no hepatosplenomegaly  EXT: no deformities, no pedal edema  SKIN: no rashes, warm and dry  NEURO: normal mentation, strength 5/5 on all 4 extremities, no sensory deficits      LABS / IMAGING      Other labs reviewed, Hg ranged 7-10, normal Cr  9/5/17 Hg A 9.3 Hg A2 3.8 Hg F 3.6 Hg S 83.3  6/3/2020 hgb 9.5, WBC 10.7, , MCV 96.7, RDW 18.4 Hg A 30.6 Hg F 5.2% Hg S 60.7%  8/10/20 Hg F 7.8% Hg A 10.8% Hg S 77.4% ferritin 2997 Cr 0.64 Hg 9 .3  ANC 7.9  10/5/20 Cr 0.63, Tbili 2.4, , Ferritin 2556, WBC 8.23, Hgb 8.9, .5, , retic 8.41, ANC 4.78, Urine Protein 11.56, Hgb A 0% A21 3.8% F 8.9% S 83.5%  11/23/20 Cr 0.69, TP 8.5, ALB 4.6, alk phos 77, T bili 2.2, , iron 151, TIBC 194, iron sat 78%, ferritin 2746, WBC 8.47, Hgb 9.2, , retic 26K, ANC 5.69, urine protein 40.21, Hgb F 10.2, Hgb S 83.8  1/27/21 Hg A 0% A2 3.8 F 11.3% S 82.5% Other 2.4% Cr 0.61 Alb 4.3 TP 7.8 Tbili 2.1 AST 19 ALT 10  Iron 128 Ferritin 2218 WBC 9.87 Hg 9.1 .2  ANC 6.6 Abs retic 125k urine protein 12.17 mg/dl  3/31/21 Cr 0.69, TP 8.1, T bili 2.2, , Ferritin 2166, Folic acid 19.68, WBC 8.23, Hgb 8.0, .3, , ANC 3.90, abs retic ~175K, urine protein neg, urine culture neg, Hgb A 0%, Hgb A2 4.1%, Hgb F 11.7%, Hgb S 80.5% other 3.7%  8/23/21 WBC 9.59, Hgb 7.9, .4, , Cr 0.74, Tbili 1.5, , Folic acid 10.48, Ferritin 1460, UA protein neg, Hgb A2 3.5% F 10.8% S 82.6%  10/15/21 Hg A2 3.2% Hg F 9.2% Hg S 84.5% Cr 0.67 Alb 4.5 Tbili 1.8  Ferritin 1850 Folate 11.45 WBC 10.47 Hg 8.5  Retic 192k UA protein neg  12/8/21 Hg A 18.1% A2 3.1% F 7.7% S 71.1%, , Ferritin 1654, Folic acid 13.44, WBC 10.39, Hgb 8.1, PLT 42, Retic 6.13%, Urine protein neg  2/3/22  Cr 0.75 Alb 4.7 TP 8.2 Tbili 2.9  Ferritin 1729 Folate 15.9 WBC 11.75 Hg 7.4 RBC 2.25 MCV 97.8 RDW 17.9  Retic 5.85% Urine protein neg Hg A 10.6% Hg F 8.3% Hg S 78.1% Hg A2 3%  4/25/22 Cr 0.77, Alb 4.5, TP 7.8, Tbili 1.8, , Ferritin 1584, folic acid 15, WBC 9.90, Hgb 8.2, , ANC 3.77, urine protein 10.47 mg/dL, hgb electrophoresis pending**  - 10/21/2022:  WBC 15.63, hemoglobin 7.4, hematocrit 21.1, platelet count 454K  - 12/01/2022: WBC 13.36, hemoglobin 7.5, hematocrit 22.6, platelet count 4 4 6.  MCV 95.8    -04/18/23: Chloride 109, cr 0.71, alb 4.5, ca 9.5, TB 2.4, , LFTs WNL, Ferritin 1457, uric acid 7.2, wbc 11.87, hgb 8.0, plt 508, ANC 4.51, retic 9.39,   -5/19/23 CMP WNL except bili 2.9, , ferritin 1467, folate 16, UA 8.1 vit D 4, , WBC 15.87, HGB 6.9, HCT 20,  , retic 9.46,   06/20/2023:  WBC count 10.3, hemoglobin 8.5, MCV 97.6, platelet count 5 110, ANC 5.9, creatinine 0.72, albumin 4.6, calcium 9.2, LFTs within normal limits, bilirubin 1.7, ferritin 1261, hemoglobin F was ordered but not done.    Imaging:  reviewed, X-Rays of b/l hips 11/2019 no AVN, CXR unremarkable    Assessment:   1. Sickle cell disease D57.1 C/w hydrea 1500mg daily, Hg F not at goal on most recent lab work, Hgb electrophoresis obtained today still pending  C/w folic acid 1mg daily  Adequate PO hydration  Analgesia with fentanyl and percocet 10mg as needed. C/w fentanyl 100mcg/hr,   Avoid overtransfusion. Only transfuse if symptomatic anemia. Do not transfuse for simple pain crisis  Of note, she did receive 3 days of Desferal and was transfused 1 unit PRBC during hospitalization at [facility] 11/2021.  Desferal x 10 days planned for 3/2022 but patient decided against Desferal, will continue to monitor ferritin closely and will Obtain hg electrophoresis, ferritin, iron, tibc, CBC w/ diff, CMP, folate, retic, UA for urine protein 1 week prior to return visit  Unknown prior vaccinations. She  believes she last got PNA vaccinations ~5 years ago.  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  - Continue folic acid 1 mg daily,  - Continue hydroxyurea 1500 daily  - Continue jadenu 1080 daily  -transfuse one unit of blood  - C/w antinausea meds   -  reviewed and prescription sent for fentanyl and Percocet  - Repeat labs on follow up including hemoglobin electrophoresis- RTC 4 WEEKS     Portions of the record may have been created with voice recognition software. Occasional wrong-word or sound-a-like substitutions may have occurred due to the inherent limitations of voice recognition software. Read the chart carefully and recognize, using context, where substitutions have occurred.

## 2023-07-13 DIAGNOSIS — N39.0 URINARY TRACT INFECTION WITHOUT HEMATURIA, SITE UNSPECIFIED: Primary | ICD-10-CM

## 2023-07-13 RX ORDER — CIPROFLOXACIN 500 MG/1
500 TABLET ORAL EVERY 12 HOURS
Qty: 12 TABLET | Refills: 0 | OUTPATIENT
Start: 2023-07-13 | End: 2023-07-19

## 2023-07-13 NOTE — TELEPHONE ENCOUNTER
Pt reports was seen in ED and given antibiotics for UTI. States medication is not covered by insurance d/t Pt is locked in to one provider. Pt is requesting Cipro sent to pharmacy by Dr. Hdz. Verified with [facility] that rejection is d/t locked in to one provider.--SC, LPN

## 2023-07-13 NOTE — TELEPHONE ENCOUNTER
Per Dr. Hdz obtain ED records. Spoke with Pt to inquire which ED she was seen at. States was seen at [facility] ED 1 month ago. Denies dysuria, pelvic pain, hematuria, or fever. Instructed urine culture was negative but if she starts having any urinary symptoms go to Urgent Care or ED for repeat UA. Dr. Hdz notified.--SC, BHARATI

## 2023-07-19 DIAGNOSIS — D57.00 HB-SS DISEASE WITH CRISIS: ICD-10-CM

## 2023-07-19 DIAGNOSIS — D57.00 SICKLE CELL PAIN CRISIS: ICD-10-CM

## 2023-07-20 RX ORDER — FENTANYL 100 UG/H
PATCH TRANSDERMAL
Qty: 10 PATCH | Refills: 0 | Status: SHIPPED | OUTPATIENT
Start: 2023-07-20 | End: 2023-08-17 | Stop reason: SDUPTHER

## 2023-07-20 RX ORDER — OXYCODONE AND ACETAMINOPHEN 10; 325 MG/1; MG/1
1 TABLET ORAL EVERY 6 HOURS PRN
Qty: 120 TABLET | Refills: 0 | Status: SHIPPED | OUTPATIENT
Start: 2023-07-20 | End: 2023-08-17 | Stop reason: SDUPTHER

## 2023-07-27 NOTE — PROGRESS NOTES
7/31/2023  Diagnosis:  - SCD    Treatment History:  - Folic acid, Hydrea, Jadenu    Current Treatment:   - hydrea 1500 mg daily  - Jadenu 1080 mg daily  - Folic acid 1 mg daily        Subjective:       HPI  Anya Matt Leal  33 y.o.  female with past medical history significant for SCD here for follow-up  According to the patient she had been on folic acid, hydroxyurea and jadenu in the past.  Patient had not been taking her HU and Jadenu recently.  Patient had multiple transfusions over the year, requiring almost 10 transfusion last year.  Denies any history of exchange transfusion or acute chest syndrome.  Denies any history of avascular necrosis.  Denies any history of stroke. No ACS ever in her life never intubated Never full exchange transfusions >20 lifetime transfusions, in 2019 pt reports ~10 units of PRBC overall  No AVN  Never told she has iron overload and has never been on iron chelation prior to initial presentation  No strokes  Not on any pediatric transfusion regimen  >10 pain crisis per year (occur almost with every cycle requiring frequent hospital visits)  hospitalization 2/2021, had partial exchange of 1 unit  Most recent hospitalization 3/2022, did not receive any blood transfusions         Interval History:   7/31/2023:  Ms. Leal is here today for her follow-up regarding her Sickle Cell Disease.  She denies any recent hospitalizations, or blood transfusion.  She reports managing pain crisis at home with good hydration, diet, rest, and pain medication.  She reports she is under a lot of stress due to 2 brothers dying within 2 months of each other.  She denies any ACS, confirms back, leg, and arm pain, denies any stroke like symptoms, fever, infections, reports fever blisters on lips, reports grade 1 fatigue, reports decrease appetite without weight loss.  She reports compliance with Folic acid, Hydrea, and Jadence.  She is requesting refills on all medications on her medication  record. See Labs section for all abnormal labs.  Discussed lab result of Vitamin D deficiency (6 ng/mL) with normal range being 30-40 ng/mL, and the need for daily Vitamin D supplementation, which may help with chronic pain and bone loss.  No other problems and concerns at this time.      Past Medical History:   Diagnosis Date    Encounter for blood transfusion     Flu 04/2019    sore throat    Sickle cell anemia       Past Surgical History:   Procedure Laterality Date    MEDIPORT INSERTION, SINGLE      TUBAL LIGATION       Social History     Socioeconomic History    Marital status:    Tobacco Use    Smoking status: Never    Smokeless tobacco: Never   Substance and Sexual Activity    Alcohol use: No    Drug use: No    Sexual activity: Yes     Partners: Male      Family History   Problem Relation Age of Onset    Hypertension Mother       Review of patient's allergies indicates:   Allergen Reactions    Aspirin     Butorphanol     Compazine [prochlorperazine edisylate]     Ketorolac      Other reaction(s): other    Morpholine analogues     Nubain [nalbuphine]     Stadol [butorphanol tartrate]     Tramadol Rash     hives      CONSTITUTIONAL: no fevers, no chills, no weight loss, no fatigue, no weakness  HEMATOLOGIC: no abnormal bleeding, no abnormal bruising, no drenching night sweats  ONCOLOGIC: no new masses or lumps  HEENT: no vision loss, no tinnitus or hearing loss, no nose bleeding, no dysphagia, no odynophagia  CVS: no chest pain, no palpitations, no dyspnea on exertion  RESP: no shortness of breath, no hemoptysis, no cough  GI: no nausea, no vomiting, no diarrhea, no constipation, no melena, no hematochezia, no hematemesis, no abdominal pain, no increase in abdominal girth  : no dysuria, no hematuria, no hesitancy, no scrotal swelling, no discharge  INTEGUMENT: no rashes, no abnormal bruising, no nail pitting, no hyperpigmentation  NEURO: no falls, no memory loss, no paresthesias or dysesthesias, no  urofecal incontinence or retention, no loss of strength on any extremity  MSK: no back pain, no new joint pain, no joint swelling  PSYCH: no suicidal or homicidal ideation, no depression, no insomnia, no anhedonia  ENDOCRINE: no heat or cold intolerance, no polyuria, no polydipsia        Objective:        Vitals:    07/31/23 1426   BP: 122/79   Pulse: 102   Resp: 18   Temp: 98.3 °F (36.8 °C)          ECOG PS 0  GA: AAOx3, NAD  HEENT:  Scleral icterus EOMI, good dentition, moist oral mucous membranes  LYMPH: no cervical, axillary or supraclavicular adenopathy  CVS: s1s2 RRR, no M/R/G, port in place without surrounding edema or erythema  RESP: CTA b/l, no crackles, no wheezes or rhonchi  ABD: soft, NT, ND, BS+, no hepatosplenomegaly  EXT: no deformities, no pedal edema  SKIN: no rashes, warm and dry  NEURO: normal mentation, strength 5/5 on all 4 extremities, no sensory deficits      LABS / IMAGING      Other labs reviewed, Hg ranged 7-10, normal Cr  9/5/17 Hg A 9.3 Hg A2 3.8 Hg F 3.6 Hg S 83.3  6/3/2020 hgb 9.5, WBC 10.7, , MCV 96.7, RDW 18.4 Hg A 30.6 Hg F 5.2% Hg S 60.7%  8/10/20 Hg F 7.8% Hg A 10.8% Hg S 77.4% ferritin 2997 Cr 0.64 Hg 9 .3  ANC 7.9  10/5/20 Cr 0.63, Tbili 2.4, , Ferritin 2556, WBC 8.23, Hgb 8.9, .5, , retic 8.41, ANC 4.78, Urine Protein 11.56, Hgb A 0% A21 3.8% F 8.9% S 83.5%  11/23/20 Cr 0.69, TP 8.5, ALB 4.6, alk phos 77, T bili 2.2, , iron 151, TIBC 194, iron sat 78%, ferritin 2746, WBC 8.47, Hgb 9.2, , retic 26K, ANC 5.69, urine protein 40.21, Hgb F 10.2, Hgb S 83.8  1/27/21 Hg A 0% A2 3.8 F 11.3% S 82.5% Other 2.4% Cr 0.61 Alb 4.3 TP 7.8 Tbili 2.1 AST 19 ALT 10  Iron 128 Ferritin 2218 WBC 9.87 Hg 9.1 .2  ANC 6.6 Abs retic 125k urine protein 12.17 mg/dl  3/31/21 Cr 0.69, TP 8.1, T bili 2.2, , Ferritin 2166, Folic acid 19.68, WBC 8.23, Hgb 8.0, .3, , ANC 3.90, abs retic ~175K, urine protein neg,  urine culture neg, Hgb A 0%, Hgb A2 4.1%, Hgb F 11.7%, Hgb S 80.5% other 3.7%  8/23/21 WBC 9.59, Hgb 7.9, .4, , Cr 0.74, Tbili 1.5, , Folic acid 10.48, Ferritin 1460, UA protein neg, Hgb A2 3.5% F 10.8% S 82.6%  10/15/21 Hg A2 3.2% Hg F 9.2% Hg S 84.5% Cr 0.67 Alb 4.5 Tbili 1.8  Ferritin 1850 Folate 11.45 WBC 10.47 Hg 8.5  Retic 192k UA protein neg  12/8/21 Hg A 18.1% A2 3.1% F 7.7% S 71.1%, , Ferritin 1654, Folic acid 13.44, WBC 10.39, Hgb 8.1, PLT 42, Retic 6.13%, Urine protein neg  2/3/22 Cr 0.75 Alb 4.7 TP 8.2 Tbili 2.9  Ferritin 1729 Folate 15.9 WBC 11.75 Hg 7.4 RBC 2.25 MCV 97.8 RDW 17.9  Retic 5.85% Urine protein neg Hg A 10.6% Hg F 8.3% Hg S 78.1% Hg A2 3%  4/25/22 Cr 0.77, Alb 4.5, TP 7.8, Tbili 1.8, , Ferritin 1584, folic acid 15, WBC 9.90, Hgb 8.2, , ANC 3.77, urine protein 10.47 mg/dL, hgb electrophoresis pending**  - 10/21/2022:  WBC 15.63, hemoglobin 7.4, hematocrit 21.1, platelet count 454K  - 12/01/2022: WBC 13.36, hemoglobin 7.5, hematocrit 22.6, platelet count 4 4 6.  MCV 95.8    -04/18/23: Chloride 109, cr 0.71, alb 4.5, ca 9.5, TB 2.4, , LFTs WNL, Ferritin 1457, uric acid 7.2, wbc 11.87, hgb 8.0, plt 508, ANC 4.51, retic 9.39,   -5/19/23 CMP WNL except bili 2.9, , ferritin 1467, folate 16, UA 8.1 vit D 4, , WBC 15.87, HGB 6.9, HCT 20,  , retic 9.46,   06/20/2023:  WBC count 10.3, hemoglobin 8.5, MCV 97.6, platelet count 5 110, ANC 5.9, creatinine 0.72, albumin 4.6, calcium 9.2, LFTs within normal limits, bilirubin 1.7, ferritin 1261, hemoglobin F was ordered but not done.  7/29/2023:  CMP:  abnormal only:  Cl 110, bilirubin 1.9.  , Ferritin 1449, Uric acid 7.5, Vit D 6 ng/mL,  CBC:  abnormal only:  WBC 13.87, Hgb 7.7, Hct 22.1, MCV 98.7, RDW-CV 16.6, platelets 387,000, retic 8.54, Lymph 6.44, mono 1.18.     Imaging:  reviewed, X-Rays of b/l hips 11/2019 no AVN, CXR unremarkable    Assessment:   1.  Sickle cell disease D57.1 C/w hydrea 1500mg daily, Hg F not at goal on most recent lab work, Hgb electrophoresis obtained today still pending  C/w folic acid 1mg daily  Adequate PO hydration  Analgesia with fentanyl and percocet 10mg as needed. C/w fentanyl 100mcg/hr,   Avoid overtransfusion. Only transfuse if symptomatic anemia. Do not transfuse for simple pain crisis  Of note, she did receive 3 days of Desferal and was transfused 1 unit PRBC during hospitalization at [facility] 11/2021.  Desferal x 10 days planned for 3/2022 but patient decided against Desferal, will continue to monitor ferritin closely and will Obtain hg electrophoresis, ferritin, iron, tibc, CBC w/ diff, CMP, folate, retic, UA for urine protein 1 week prior to return visit  Unknown prior vaccinations. She believes she last got PNA vaccinations ~5 years ago.  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   She is under a lot of stress due to 2 brothers dying within 2 months of each other.     Reports compliance with Folic acid, Hydrea, and Jadence.    Requesting refills on all medications on her medication record.   Vitamin D deficiency (6 ng/mL).    Plan:   - Reinforced medication compliance, good oral hydration, diet, and rest for current pain crisis.  - Continue folic acid 1 mg daily,  - Continue hydroxyurea 1500 daily  - Continue Jadenu 1080 daily  - C/w antinausea meds   - Continue with pain medication for pain crisis.  - Will order Vit D 3 today for Vit D deficiency  - Hemoglobin Electrophoresis is pending from today's labs.  -- Repeat labs on follow up including hemoglobin electrophoresis- RTC 4 WEEKS     SUE Francis

## 2023-07-31 ENCOUNTER — OFFICE VISIT (OUTPATIENT)
Dept: HEMATOLOGY/ONCOLOGY | Facility: CLINIC | Age: 33
End: 2023-07-31
Payer: MEDICAID

## 2023-07-31 VITALS
BODY MASS INDEX: 26.66 KG/M2 | DIASTOLIC BLOOD PRESSURE: 79 MMHG | OXYGEN SATURATION: 100 % | TEMPERATURE: 98 F | RESPIRATION RATE: 18 BRPM | HEART RATE: 102 BPM | HEIGHT: 67 IN | WEIGHT: 169.88 LBS | SYSTOLIC BLOOD PRESSURE: 122 MMHG

## 2023-07-31 DIAGNOSIS — D57.1 SICKLE CELL DISEASE WITHOUT CRISIS: Primary | ICD-10-CM

## 2023-07-31 DIAGNOSIS — D57.00 HB-SS DISEASE WITH CRISIS: ICD-10-CM

## 2023-07-31 DIAGNOSIS — E83.111 IRON OVERLOAD DUE TO REPEATED RED BLOOD CELL TRANSFUSIONS: ICD-10-CM

## 2023-07-31 DIAGNOSIS — D57.00 SICKLE CELL PAIN CRISIS: ICD-10-CM

## 2023-07-31 PROBLEM — E83.119 HEMOCHROMATOSIS: Status: ACTIVE | Noted: 2019-09-08

## 2023-07-31 PROCEDURE — 3008F BODY MASS INDEX DOCD: CPT | Mod: CPTII,,, | Performed by: NURSE PRACTITIONER

## 2023-07-31 PROCEDURE — 3074F PR MOST RECENT SYSTOLIC BLOOD PRESSURE < 130 MM HG: ICD-10-PCS | Mod: CPTII,,, | Performed by: NURSE PRACTITIONER

## 2023-07-31 PROCEDURE — 1159F MED LIST DOCD IN RCRD: CPT | Mod: CPTII,,, | Performed by: NURSE PRACTITIONER

## 2023-07-31 PROCEDURE — 1159F PR MEDICATION LIST DOCUMENTED IN MEDICAL RECORD: ICD-10-PCS | Mod: CPTII,,, | Performed by: NURSE PRACTITIONER

## 2023-07-31 PROCEDURE — 99214 PR OFFICE/OUTPT VISIT, EST, LEVL IV, 30-39 MIN: ICD-10-PCS | Mod: ,,, | Performed by: NURSE PRACTITIONER

## 2023-07-31 PROCEDURE — 3008F PR BODY MASS INDEX (BMI) DOCUMENTED: ICD-10-PCS | Mod: CPTII,,, | Performed by: NURSE PRACTITIONER

## 2023-07-31 PROCEDURE — 99214 OFFICE O/P EST MOD 30 MIN: CPT | Mod: ,,, | Performed by: NURSE PRACTITIONER

## 2023-07-31 PROCEDURE — 3074F SYST BP LT 130 MM HG: CPT | Mod: CPTII,,, | Performed by: NURSE PRACTITIONER

## 2023-07-31 PROCEDURE — 3078F DIAST BP <80 MM HG: CPT | Mod: CPTII,,, | Performed by: NURSE PRACTITIONER

## 2023-07-31 PROCEDURE — 3078F PR MOST RECENT DIASTOLIC BLOOD PRESSURE < 80 MM HG: ICD-10-PCS | Mod: CPTII,,, | Performed by: NURSE PRACTITIONER

## 2023-07-31 RX ORDER — DEFERASIROX 360 MG/1
1080 TABLET, FILM COATED ORAL DAILY
Qty: 90 TABLET | Refills: 3 | Status: SHIPPED | OUTPATIENT
Start: 2023-07-31 | End: 2023-10-04 | Stop reason: SDUPTHER

## 2023-07-31 RX ORDER — FOLIC ACID 1 MG/1
1 TABLET ORAL DAILY
Qty: 90 TABLET | Refills: 3 | Status: SHIPPED | OUTPATIENT
Start: 2023-07-31 | End: 2023-10-04 | Stop reason: SDUPTHER

## 2023-07-31 RX ORDER — PROMETHAZINE HYDROCHLORIDE 25 MG/1
12.5 TABLET ORAL EVERY 6 HOURS PRN
Qty: 30 TABLET | Refills: 1 | Status: SHIPPED | OUTPATIENT
Start: 2023-07-31 | End: 2023-08-30 | Stop reason: SDUPTHER

## 2023-07-31 RX ORDER — ONDANSETRON 4 MG/1
8 TABLET, FILM COATED ORAL EVERY 8 HOURS PRN
Qty: 30 TABLET | Refills: 3 | Status: SHIPPED | OUTPATIENT
Start: 2023-07-31 | End: 2023-10-04 | Stop reason: SDUPTHER

## 2023-08-01 DIAGNOSIS — E55.9 VITAMIN D DEFICIENCY: Primary | ICD-10-CM

## 2023-08-01 RX ORDER — ASPIRIN 325 MG
50000 TABLET, DELAYED RELEASE (ENTERIC COATED) ORAL
Qty: 20 CAPSULE | Refills: 1 | Status: SHIPPED | OUTPATIENT
Start: 2023-08-01 | End: 2023-10-04 | Stop reason: SDUPTHER

## 2023-08-17 DIAGNOSIS — D57.00 SICKLE CELL PAIN CRISIS: ICD-10-CM

## 2023-08-17 DIAGNOSIS — D57.00 HB-SS DISEASE WITH CRISIS: ICD-10-CM

## 2023-08-17 RX ORDER — OXYCODONE AND ACETAMINOPHEN 10; 325 MG/1; MG/1
1 TABLET ORAL EVERY 6 HOURS PRN
Qty: 120 TABLET | Refills: 0 | Status: SHIPPED | OUTPATIENT
Start: 2023-08-17 | End: 2023-09-14 | Stop reason: SDUPTHER

## 2023-08-17 RX ORDER — FENTANYL 100 UG/H
PATCH TRANSDERMAL
Qty: 10 PATCH | Refills: 0 | Status: SHIPPED | OUTPATIENT
Start: 2023-08-17 | End: 2023-09-14 | Stop reason: SDUPTHER

## 2023-08-29 NOTE — PROGRESS NOTES
"  8/30/2023  Diagnosis:  - SCD    Treatment History:  - Folic acid, Hydrea, Jadenu    Current Treatment:   - hydrea 1500 mg daily  - Jadenu 1080 mg daily  - Folic acid 1 mg daily        Subjective:       HPI  Gustavomegan Leal  33 y.o.  female with past medical history significant for SCD here for follow-up  According to the patient she had been on folic acid, hydroxyurea and jadenu in the past.  Patient had not been taking her HU and Jadenu recently.  Patient had multiple transfusions over the year, requiring almost 10 transfusion last year.  Denies any history of exchange transfusion or acute chest syndrome.  Denies any history of avascular necrosis.  Denies any history of stroke. No ACS ever in her life never intubated Never full exchange transfusions >20 lifetime transfusions, in 2019 pt reports ~10 units of PRBC overall  No AVN  Never told she has iron overload and has never been on iron chelation prior to initial presentation  No strokes  Not on any pediatric transfusion regimen  >10 pain crisis per year (occur almost with every cycle requiring frequent hospital visits)  hospitalization 2/2021, had partial exchange of 1 unit  Most recent hospitalization 3/2022, did not receive any blood transfusions         Interval History:   8/30/2023:  Ms. Leal is here today for her follow-up regarding her Sickle Cell Disease.  She denies any recent hospitalizations, or blood transfusion.  She reports continued pain crisis that she is managing at home with good hydration, diet, rest, and pain medication.   She rates her pain an "8.5" on 0-10 pain scale.  She is asking for pain medication refills, but received 30-day supply on 8/17/2023, per  review today.  She denies any ACS, confirms back, leg, and arm pain, denies any stroke like symptoms, fever, infections, reports fever blisters on lips, reports grade 1 fatigue, reports decreased appetite without weight loss.  She reports compliance with Folic acid, " Hydrea, and Jadence.  She is requesting refills on Promethazine and Vitamin D. See Labs section for all abnormal labs, and labs reviewed with patient.    Past Medical History:   Diagnosis Date    Encounter for blood transfusion     Flu 04/2019    sore throat    Sickle cell anemia       Past Surgical History:   Procedure Laterality Date    MEDIPORT INSERTION, SINGLE      TUBAL LIGATION       Social History     Socioeconomic History    Marital status:    Tobacco Use    Smoking status: Never    Smokeless tobacco: Never   Substance and Sexual Activity    Alcohol use: No    Drug use: No    Sexual activity: Yes     Partners: Male      Family History   Problem Relation Age of Onset    Hypertension Mother       Review of patient's allergies indicates:   Allergen Reactions    Aspirin     Butorphanol     Compazine [prochlorperazine edisylate]     Ketorolac      Other reaction(s): other    Morpholine analogues     Nubain [nalbuphine]     Stadol [butorphanol tartrate]     Tramadol Rash     hives      CONSTITUTIONAL: no fevers, no chills, no weight loss, + fatigue, no weakness  HEMATOLOGIC: no abnormal bleeding, no abnormal bruising, no drenching night sweats  ONCOLOGIC: no new masses or lumps  HEENT: no vision loss, no tinnitus or hearing loss, no nose bleeding, no dysphagia, no odynophagia  CVS: no chest pain, no palpitations, no dyspnea on exertion  RESP: no shortness of breath, no hemoptysis, no cough  GI: + nausea, no vomiting, no diarrhea, no constipation, no melena, no hematochezia, no hematemesis, no abdominal pain, no increase in abdominal girth  : no dysuria, no hematuria, no hesitancy, no scrotal swelling, no discharge  INTEGUMENT: no rashes, no abnormal bruising, no nail pitting, no hyperpigmentation  NEURO: no falls, no memory loss, no paresthesias or dysesthesias, no urofecal incontinence or retention, no loss of strength on any extremity  MSK: + muscle, bone, and joint pain all extremities and back.     PSYCH: no suicidal or homicidal ideation, no depression, no insomnia, no anhedonia  ENDOCRINE: no heat or cold intolerance, no polyuria, no polydipsia    Objective:        Vitals:    08/30/23 1000   BP: 114/64   Pulse: (!) 117   Resp: 18   Temp: 99.4 °F (37.4 °C)   ECOG PS 0  GA: AAOx3, NAD  HEENT:  Scleral icterus EOMI, good dentition, moist oral mucous membranes  LYMPH: no cervical, axillary or supraclavicular adenopathy  CVS: s1s2 RRR, no M/R/G, port in place without surrounding edema or erythema  RESP: CTA b/l, no crackles, no wheezes or rhonchi  ABD: soft, NT, ND, BS+, no hepatosplenomegaly  EXT: no deformities, no pedal edema  SKIN: no rashes, warm and dry  NEURO: normal mentation, strength 5/5 on all 4 extremities, no sensory deficits      LABS / IMAGING      Other labs reviewed, Hg ranged 7-10, normal Cr  9/5/17 Hg A 9.3 Hg A2 3.8 Hg F 3.6 Hg S 83.3  6/3/2020 hgb 9.5, WBC 10.7, , MCV 96.7, RDW 18.4 Hg A 30.6 Hg F 5.2% Hg S 60.7%  8/10/20 Hg F 7.8% Hg A 10.8% Hg S 77.4% ferritin 2997 Cr 0.64 Hg 9 .3  ANC 7.9  10/5/20 Cr 0.63, Tbili 2.4, , Ferritin 2556, WBC 8.23, Hgb 8.9, .5, , retic 8.41, ANC 4.78, Urine Protein 11.56, Hgb A 0% A21 3.8% F 8.9% S 83.5%  11/23/20 Cr 0.69, TP 8.5, ALB 4.6, alk phos 77, T bili 2.2, , iron 151, TIBC 194, iron sat 78%, ferritin 2746, WBC 8.47, Hgb 9.2, , retic 26K, ANC 5.69, urine protein 40.21, Hgb F 10.2, Hgb S 83.8  1/27/21 Hg A 0% A2 3.8 F 11.3% S 82.5% Other 2.4% Cr 0.61 Alb 4.3 TP 7.8 Tbili 2.1 AST 19 ALT 10  Iron 128 Ferritin 2218 WBC 9.87 Hg 9.1 .2  ANC 6.6 Abs retic 125k urine protein 12.17 mg/dl  3/31/21 Cr 0.69, TP 8.1, T bili 2.2, , Ferritin 2166, Folic acid 19.68, WBC 8.23, Hgb 8.0, .3, , ANC 3.90, abs retic ~175K, urine protein neg, urine culture neg, Hgb A 0%, Hgb A2 4.1%, Hgb F 11.7%, Hgb S 80.5% other 3.7%  8/23/21 WBC 9.59, Hgb 7.9, .4, , Cr 0.74,  Tbili 1.5, , Folic acid 10.48, Ferritin 1460, UA protein neg, Hgb A2 3.5% F 10.8% S 82.6%  10/15/21 Hg A2 3.2% Hg F 9.2% Hg S 84.5% Cr 0.67 Alb 4.5 Tbili 1.8  Ferritin 1850 Folate 11.45 WBC 10.47 Hg 8.5  Retic 192k UA protein neg  12/8/21 Hg A 18.1% A2 3.1% F 7.7% S 71.1%, , Ferritin 1654, Folic acid 13.44, WBC 10.39, Hgb 8.1, PLT 42, Retic 6.13%, Urine protein neg  2/3/22 Cr 0.75 Alb 4.7 TP 8.2 Tbili 2.9  Ferritin 1729 Folate 15.9 WBC 11.75 Hg 7.4 RBC 2.25 MCV 97.8 RDW 17.9  Retic 5.85% Urine protein neg Hg A 10.6% Hg F 8.3% Hg S 78.1% Hg A2 3%  4/25/22 Cr 0.77, Alb 4.5, TP 7.8, Tbili 1.8, , Ferritin 1584, folic acid 15, WBC 9.90, Hgb 8.2, , ANC 3.77, urine protein 10.47 mg/dL, hgb electrophoresis pending**  - 10/21/2022:  WBC 15.63, hemoglobin 7.4, hematocrit 21.1, platelet count 454K  - 12/01/2022: WBC 13.36, hemoglobin 7.5, hematocrit 22.6, platelet count 4 4 6.  MCV 95.8    -04/18/23: Chloride 109, cr 0.71, alb 4.5, ca 9.5, TB 2.4, , LFTs WNL, Ferritin 1457, uric acid 7.2, wbc 11.87, hgb 8.0, plt 508, ANC 4.51, retic 9.39,   -5/19/23 CMP WNL except bili 2.9, , ferritin 1467, folate 16, UA 8.1 vit D 4, , WBC 15.87, HGB 6.9, HCT 20,  , retic 9.46,   06/20/2023:  WBC count 10.3, hemoglobin 8.5, MCV 97.6, platelet count 5 110, ANC 5.9, creatinine 0.72, albumin 4.6, calcium 9.2, LFTs within normal limits, bilirubin 1.7, ferritin 1261, hemoglobin F was ordered but not done.  7/29/2023:  CMP:  abnormal only:  Cl 110, bilirubin 1.9.  , Ferritin 1449, Uric acid 7.5, Vit D 6 ng/mL,  CBC:  abnormal only:  WBC 13.87, Hgb 7.7, Hct 22.1, MCV 98.7, RDW-CV 16.6, platelets 387,000, retic 8.54, Lymph 6.44, mono 1.18.   8/30/2023:  CMP:  Cl- 109, BUN 6.97, creatinine 0.78,  Total Bili 2.1, , Ferritin 1575.  CBC:  WBC 11.66, Hgb 7.9, Hct 23.5, platelets 428,000, Retic 8.23.  8/23/2023: Hgb F 6.1    Imaging:  reviewed, X-Rays of b/l hips  11/2019 no AVN, CXR unremarkable    Assessment:   1. Sickle cell disease D57.1 C/w hydrea 1500mg daily, Hg F not at goal on most recent lab work, Hgb electrophoresis obtained today still pending  C/w folic acid 1mg daily  Adequate PO hydration  Analgesia with fentanyl and percocet 10mg as needed. C/w fentanyl 100mcg/hr,   Avoid overtransfusion. Only transfuse if symptomatic anemia. Do not transfuse for simple pain crisis  Of note, she did receive 3 days of Desferal and was transfused 1 unit PRBC during hospitalization at [facility] 11/2021.  Desferal x 10 days planned for 3/2022 but patient decided against Desferal, will continue to monitor ferritin closely and will Obtain hg electrophoresis, ferritin, iron, tibc, CBC w/ diff, CMP, folate, retic, UA for urine protein 1 week prior to return visit  Unknown prior vaccinations. She believes she last got PNA vaccinations ~5 years ago.  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   She is under a lot of stress due to 2 brothers dying within 2 months of each other.     Reports compliance with Folic acid, Hydrea, and Jadence.    Requesting refills on Promethazine, Vitamin D, and pain medication  Vitamin D deficiency (6 ng/mL).    Plan:   8/30/2023- Reinforced medication compliance, good oral hydration, diet, and rest for current pain crisis.  - Continue folic acid 1 mg daily,  - Continue hydroxyurea 1500 daily  - Continue Jadenu 1080 daily  - C/w antinausea meds, Promethazine ordered today  - Continue with pain medication for pain crisis.  No pain meds refilled today.  Refill on 9/17/2023  - Will order Vit D 3 today for Vit D deficiency  -- Repeat labs on follow up including hemoglobin electrophoresis- RTC 4 WEEKS     The patient is in agreement with today's plan of care.   All questions answered.  Patient is aware to contact our office for any problems or concerns prior to next visit.      Vivian Hankins, MSN-ED, APRN, AGACNP-BC, OCN

## 2023-08-30 ENCOUNTER — OFFICE VISIT (OUTPATIENT)
Dept: HEMATOLOGY/ONCOLOGY | Facility: CLINIC | Age: 33
End: 2023-08-30
Payer: MEDICAID

## 2023-08-30 VITALS
HEART RATE: 117 BPM | SYSTOLIC BLOOD PRESSURE: 114 MMHG | WEIGHT: 172 LBS | RESPIRATION RATE: 18 BRPM | BODY MASS INDEX: 27 KG/M2 | HEIGHT: 67 IN | DIASTOLIC BLOOD PRESSURE: 64 MMHG | OXYGEN SATURATION: 97 % | TEMPERATURE: 99 F

## 2023-08-30 DIAGNOSIS — D57.1 SICKLING DISORDER DUE TO HEMOGLOBIN S WITHOUT CRISIS: ICD-10-CM

## 2023-08-30 DIAGNOSIS — D57.00 HB-SS DISEASE WITH CRISIS: ICD-10-CM

## 2023-08-30 DIAGNOSIS — E83.111 IRON OVERLOAD DUE TO REPEATED RED BLOOD CELL TRANSFUSIONS: Primary | ICD-10-CM

## 2023-08-30 PROCEDURE — 3008F BODY MASS INDEX DOCD: CPT | Mod: CPTII,,, | Performed by: NURSE PRACTITIONER

## 2023-08-30 PROCEDURE — 3078F PR MOST RECENT DIASTOLIC BLOOD PRESSURE < 80 MM HG: ICD-10-PCS | Mod: CPTII,,, | Performed by: NURSE PRACTITIONER

## 2023-08-30 PROCEDURE — 3074F PR MOST RECENT SYSTOLIC BLOOD PRESSURE < 130 MM HG: ICD-10-PCS | Mod: CPTII,,, | Performed by: NURSE PRACTITIONER

## 2023-08-30 PROCEDURE — 1160F PR REVIEW ALL MEDS BY PRESCRIBER/CLIN PHARMACIST DOCUMENTED: ICD-10-PCS | Mod: CPTII,,, | Performed by: NURSE PRACTITIONER

## 2023-08-30 PROCEDURE — 3078F DIAST BP <80 MM HG: CPT | Mod: CPTII,,, | Performed by: NURSE PRACTITIONER

## 2023-08-30 PROCEDURE — 3008F PR BODY MASS INDEX (BMI) DOCUMENTED: ICD-10-PCS | Mod: CPTII,,, | Performed by: NURSE PRACTITIONER

## 2023-08-30 PROCEDURE — 1160F RVW MEDS BY RX/DR IN RCRD: CPT | Mod: CPTII,,, | Performed by: NURSE PRACTITIONER

## 2023-08-30 PROCEDURE — 1159F MED LIST DOCD IN RCRD: CPT | Mod: CPTII,,, | Performed by: NURSE PRACTITIONER

## 2023-08-30 PROCEDURE — 3074F SYST BP LT 130 MM HG: CPT | Mod: CPTII,,, | Performed by: NURSE PRACTITIONER

## 2023-08-30 PROCEDURE — 1159F PR MEDICATION LIST DOCUMENTED IN MEDICAL RECORD: ICD-10-PCS | Mod: CPTII,,, | Performed by: NURSE PRACTITIONER

## 2023-08-30 PROCEDURE — 99214 OFFICE O/P EST MOD 30 MIN: CPT | Mod: ,,, | Performed by: NURSE PRACTITIONER

## 2023-08-30 PROCEDURE — 99214 PR OFFICE/OUTPT VISIT, EST, LEVL IV, 30-39 MIN: ICD-10-PCS | Mod: ,,, | Performed by: NURSE PRACTITIONER

## 2023-08-30 RX ORDER — PROMETHAZINE HYDROCHLORIDE 25 MG/1
12.5 TABLET ORAL EVERY 6 HOURS PRN
Qty: 30 TABLET | Refills: 1 | Status: SHIPPED | OUTPATIENT
Start: 2023-08-30 | End: 2023-08-30 | Stop reason: SDUPTHER

## 2023-08-30 RX ORDER — PROMETHAZINE HYDROCHLORIDE 25 MG/1
12.5 TABLET ORAL EVERY 6 HOURS PRN
Qty: 30 TABLET | Refills: 1 | Status: SHIPPED | OUTPATIENT
Start: 2023-08-30 | End: 2023-10-04 | Stop reason: SDUPTHER

## 2023-08-30 RX ORDER — ACYCLOVIR 400 MG/1
400 TABLET ORAL 2 TIMES DAILY
Qty: 10 TABLET | Refills: 0 | Status: SHIPPED | OUTPATIENT
Start: 2023-08-30 | End: 2023-08-30 | Stop reason: SDUPTHER

## 2023-08-30 RX ORDER — ACYCLOVIR 400 MG/1
400 TABLET ORAL 2 TIMES DAILY
Qty: 10 TABLET | Refills: 0 | Status: SHIPPED | OUTPATIENT
Start: 2023-08-30 | End: 2023-10-04 | Stop reason: SDUPTHER

## 2023-08-30 NOTE — TELEPHONE ENCOUNTER
[facility] reports received Rx from Vivian Hankins NP and insurance will not pay for Rx d/t Pt is locked in to Dr. Hdz therefore cannot receive Rx from any other providers.--SC, LPN

## 2023-09-14 DIAGNOSIS — D57.00 HB-SS DISEASE WITH CRISIS: ICD-10-CM

## 2023-09-14 DIAGNOSIS — D57.00 SICKLE CELL PAIN CRISIS: ICD-10-CM

## 2023-09-14 RX ORDER — OXYCODONE AND ACETAMINOPHEN 10; 325 MG/1; MG/1
1 TABLET ORAL EVERY 6 HOURS PRN
Qty: 120 TABLET | Refills: 0 | Status: SHIPPED | OUTPATIENT
Start: 2023-09-14 | End: 2023-10-10 | Stop reason: SDUPTHER

## 2023-09-14 RX ORDER — FENTANYL 100 UG/H
PATCH TRANSDERMAL
Qty: 10 PATCH | Refills: 0 | Status: SHIPPED | OUTPATIENT
Start: 2023-09-14 | End: 2023-10-10 | Stop reason: SDUPTHER

## 2023-10-04 ENCOUNTER — OFFICE VISIT (OUTPATIENT)
Dept: HEMATOLOGY/ONCOLOGY | Facility: CLINIC | Age: 33
End: 2023-10-04
Payer: MEDICAID

## 2023-10-04 VITALS
BODY MASS INDEX: 25.69 KG/M2 | OXYGEN SATURATION: 97 % | HEART RATE: 93 BPM | TEMPERATURE: 98 F | RESPIRATION RATE: 20 BRPM | DIASTOLIC BLOOD PRESSURE: 84 MMHG | HEIGHT: 67 IN | SYSTOLIC BLOOD PRESSURE: 120 MMHG | WEIGHT: 163.69 LBS

## 2023-10-04 DIAGNOSIS — D57.1 SICKLING DISORDER DUE TO HEMOGLOBIN S WITHOUT CRISIS: ICD-10-CM

## 2023-10-04 DIAGNOSIS — D57.00 SICKLE CELL PAIN CRISIS: ICD-10-CM

## 2023-10-04 DIAGNOSIS — E55.9 VITAMIN D DEFICIENCY: ICD-10-CM

## 2023-10-04 DIAGNOSIS — K21.9 GASTROESOPHAGEAL REFLUX DISEASE, UNSPECIFIED WHETHER ESOPHAGITIS PRESENT: Primary | ICD-10-CM

## 2023-10-04 DIAGNOSIS — D57.00 HB-SS DISEASE WITH CRISIS: ICD-10-CM

## 2023-10-04 DIAGNOSIS — E83.111 IRON OVERLOAD DUE TO REPEATED RED BLOOD CELL TRANSFUSIONS: ICD-10-CM

## 2023-10-04 PROCEDURE — 3079F PR MOST RECENT DIASTOLIC BLOOD PRESSURE 80-89 MM HG: ICD-10-PCS | Mod: CPTII,,, | Performed by: NURSE PRACTITIONER

## 2023-10-04 PROCEDURE — 1159F MED LIST DOCD IN RCRD: CPT | Mod: CPTII,,, | Performed by: NURSE PRACTITIONER

## 2023-10-04 PROCEDURE — 3079F DIAST BP 80-89 MM HG: CPT | Mod: CPTII,,, | Performed by: NURSE PRACTITIONER

## 2023-10-04 PROCEDURE — 99214 PR OFFICE/OUTPT VISIT, EST, LEVL IV, 30-39 MIN: ICD-10-PCS | Mod: ,,, | Performed by: NURSE PRACTITIONER

## 2023-10-04 PROCEDURE — 99214 OFFICE O/P EST MOD 30 MIN: CPT | Mod: ,,, | Performed by: NURSE PRACTITIONER

## 2023-10-04 PROCEDURE — 1159F PR MEDICATION LIST DOCUMENTED IN MEDICAL RECORD: ICD-10-PCS | Mod: CPTII,,, | Performed by: NURSE PRACTITIONER

## 2023-10-04 PROCEDURE — 3074F PR MOST RECENT SYSTOLIC BLOOD PRESSURE < 130 MM HG: ICD-10-PCS | Mod: CPTII,,, | Performed by: NURSE PRACTITIONER

## 2023-10-04 PROCEDURE — 3008F BODY MASS INDEX DOCD: CPT | Mod: CPTII,,, | Performed by: NURSE PRACTITIONER

## 2023-10-04 PROCEDURE — 3074F SYST BP LT 130 MM HG: CPT | Mod: CPTII,,, | Performed by: NURSE PRACTITIONER

## 2023-10-04 PROCEDURE — 3008F PR BODY MASS INDEX (BMI) DOCUMENTED: ICD-10-PCS | Mod: CPTII,,, | Performed by: NURSE PRACTITIONER

## 2023-10-04 RX ORDER — PROMETHAZINE HYDROCHLORIDE 25 MG/1
12.5 TABLET ORAL EVERY 6 HOURS PRN
Qty: 30 TABLET | Refills: 1 | Status: SHIPPED | OUTPATIENT
Start: 2023-10-04 | End: 2023-10-18 | Stop reason: SDUPTHER

## 2023-10-04 RX ORDER — FOLIC ACID 1 MG/1
1 TABLET ORAL DAILY
Qty: 90 TABLET | Refills: 3 | Status: SHIPPED | OUTPATIENT
Start: 2023-10-04 | End: 2023-10-18 | Stop reason: SDUPTHER

## 2023-10-04 RX ORDER — DEFERASIROX 360 MG/1
1080 TABLET, FILM COATED ORAL DAILY
Qty: 90 TABLET | Refills: 3 | Status: SHIPPED | OUTPATIENT
Start: 2023-10-04 | End: 2023-10-18 | Stop reason: SDUPTHER

## 2023-10-04 RX ORDER — ASPIRIN 325 MG
50000 TABLET, DELAYED RELEASE (ENTERIC COATED) ORAL
Qty: 20 CAPSULE | Refills: 1 | Status: SHIPPED | OUTPATIENT
Start: 2023-10-04

## 2023-10-04 RX ORDER — HYDROXYUREA 500 MG/1
1500 CAPSULE ORAL DAILY
Qty: 90 CAPSULE | Refills: 5 | Status: SHIPPED | OUTPATIENT
Start: 2023-10-04 | End: 2023-10-18 | Stop reason: SDUPTHER

## 2023-10-04 RX ORDER — ACYCLOVIR 400 MG/1
400 TABLET ORAL 2 TIMES DAILY
Qty: 10 TABLET | Refills: 0 | Status: SHIPPED | OUTPATIENT
Start: 2023-10-04 | End: 2024-01-04 | Stop reason: SDUPTHER

## 2023-10-04 RX ORDER — PANTOPRAZOLE SODIUM 40 MG/1
40 TABLET, DELAYED RELEASE ORAL DAILY
Qty: 120 TABLET | Refills: 1 | Status: SHIPPED | OUTPATIENT
Start: 2023-10-04 | End: 2023-11-08 | Stop reason: SDUPTHER

## 2023-10-04 RX ORDER — ONDANSETRON 4 MG/1
8 TABLET, FILM COATED ORAL EVERY 8 HOURS PRN
Qty: 30 TABLET | Refills: 3 | Status: SHIPPED | OUTPATIENT
Start: 2023-10-04 | End: 2023-11-08 | Stop reason: SDUPTHER

## 2023-10-04 NOTE — PROGRESS NOTES
"10/04/2023  Diagnosis:  - SCD    Treatment History:  - Folic acid, Hydrea, Jadenu    Current Treatment:   - hydrea 1500 mg daily  - Jadenu 1080 mg daily  - Folic acid 1 mg daily        Subjective:       HPI  Anya Leal  33 y.o.  female with past medical history significant for SCD here for follow-up  According to the patient she had been on folic acid, hydroxyurea and jadenu in the past.  Patient had not been taking her HU and Jadenu recently.  Patient had multiple transfusions over the year, requiring almost 10 transfusion last year.  Denies any history of exchange transfusion or acute chest syndrome.  Denies any history of avascular necrosis.  Denies any history of stroke. No ACS ever in her life never intubated Never full exchange transfusions >20 lifetime transfusions, in 2019 pt reports ~10 units of PRBC overall  No AVN  Never told she has iron overload and has never been on iron chelation prior to initial presentation  No strokes  Not on any pediatric transfusion regimen  >10 pain crisis per year (occur almost with every cycle requiring frequent hospital visits)  hospitalization 2/2021, had partial exchange of 1 unit  Most recent hospitalization 3/2022, did not receive any blood transfusions         Interval History:   10/04/2023:  Ms. Leal is here today for her follow-up regarding her Sickle Cell Disease.  She reports having Covid-19, Strep throat, and UTI in the month of September.  Today, she reports chronic leg, back, and arm discomfort she rates her pain an "6" on 0-10 pain scale.   she continues managing at home with good hydration, diet, rest, and pain medication.   She is requesting pain medication refills, along with all daily medications to be refilled at this visit.  She denies any ACS, confirms back, leg, and arm pain, denies any stroke like symptoms, fever, infections, reports grade 1 fatigue, reports decreased appetite with weight loss, which she attributes to being ill last " month.  She reports compliance with Folic acid, Hydrea, and Jadence.  Labs reviewed with patient dated 9/26/2023:  Cl- 111, Creatinine 0.84, Total Bili 2.4, , Ferritin 2215, WBC 16.06, Hgb 7.0, Hct 21.0, Plt 545,000, retic 4.18, Hgb F 5.4.  Past Medical History:   Diagnosis Date    Encounter for blood transfusion     Flu 04/2019    sore throat    Sickle cell anemia       Past Surgical History:   Procedure Laterality Date    MEDIPORT INSERTION, SINGLE      TUBAL LIGATION       Social History     Socioeconomic History    Marital status:    Tobacco Use    Smoking status: Never    Smokeless tobacco: Never   Substance and Sexual Activity    Alcohol use: No    Drug use: No    Sexual activity: Yes     Partners: Male      Family History   Problem Relation Age of Onset    Hypertension Mother       Review of patient's allergies indicates:   Allergen Reactions    Aspirin     Butorphanol     Compazine [prochlorperazine edisylate]     Ketorolac      Other reaction(s): other    Morpholine analogues     Nubain [nalbuphine]     Stadol [butorphanol tartrate]     Tramadol Rash     hives      CONSTITUTIONAL: no fevers, no chills, no weight loss, + fatigue, no weakness  HEMATOLOGIC: no abnormal bleeding, no abnormal bruising, no drenching night sweats  ONCOLOGIC: no new masses or lumps  HEENT: no vision loss, no tinnitus or hearing loss, no nose bleeding, no dysphagia, no odynophagia  CVS: no chest pain, no palpitations, no dyspnea on exertion  RESP: no shortness of breath, no hemoptysis, no cough  GI: + nausea, no vomiting, no diarrhea, no constipation, no melena, no hematochezia, no hematemesis, no abdominal pain, no increase in abdominal girth  : no dysuria, no hematuria, no hesitancy, no scrotal swelling, no discharge  INTEGUMENT: no rashes, no abnormal bruising, no nail pitting, no hyperpigmentation  NEURO: no falls, no memory loss, no paresthesias or dysesthesias, no urofecal incontinence or retention, no loss of  strength on any extremity  MSK: + muscle, bone, and joint pain all extremities and back.    PSYCH: no suicidal or homicidal ideation, no depression, no insomnia, no anhedonia  ENDOCRINE: no heat or cold intolerance, no polyuria, no polydipsia    Objective:        Vitals:    10/04/23 1003   BP: 120/84   Pulse: 93   Resp: 20   Temp: 98.2 °F (36.8 °C)   ECOG PS 0  GA: AAOx3, NAD  HEENT:  Scleral icterus EOMI, good dentition, moist oral mucous membranes  LYMPH: no cervical, axillary or supraclavicular adenopathy  CVS: s1s2 RRR, no M/R/G, port in place without surrounding edema or erythema  RESP: CTA b/l, no crackles, no wheezes or rhonchi  ABD: soft, NT, ND, BS+, no hepatosplenomegaly  EXT: no deformities, no pedal edema  SKIN: no rashes, warm and dry  NEURO: normal mentation, strength 5/5 on all 4 extremities, no sensory deficits      LABS / IMAGING      Other labs reviewed, Hg ranged 7-10, normal Cr  9/5/17 Hg A 9.3 Hg A2 3.8 Hg F 3.6 Hg S 83.3  6/3/2020 hgb 9.5, WBC 10.7, , MCV 96.7, RDW 18.4 Hg A 30.6 Hg F 5.2% Hg S 60.7%  8/10/20 Hg F 7.8% Hg A 10.8% Hg S 77.4% ferritin 2997 Cr 0.64 Hg 9 .3  ANC 7.9  10/5/20 Cr 0.63, Tbili 2.4, , Ferritin 2556, WBC 8.23, Hgb 8.9, .5, , retic 8.41, ANC 4.78, Urine Protein 11.56, Hgb A 0% A21 3.8% F 8.9% S 83.5%  11/23/20 Cr 0.69, TP 8.5, ALB 4.6, alk phos 77, T bili 2.2, , iron 151, TIBC 194, iron sat 78%, ferritin 2746, WBC 8.47, Hgb 9.2, , retic 26K, ANC 5.69, urine protein 40.21, Hgb F 10.2, Hgb S 83.8  1/27/21 Hg A 0% A2 3.8 F 11.3% S 82.5% Other 2.4% Cr 0.61 Alb 4.3 TP 7.8 Tbili 2.1 AST 19 ALT 10  Iron 128 Ferritin 2218 WBC 9.87 Hg 9.1 .2  ANC 6.6 Abs retic 125k urine protein 12.17 mg/dl  3/31/21 Cr 0.69, TP 8.1, T bili 2.2, , Ferritin 2166, Folic acid 19.68, WBC 8.23, Hgb 8.0, .3, , ANC 3.90, abs retic ~175K, urine protein neg, urine culture neg, Hgb A 0%, Hgb A2 4.1%, Hgb F  11.7%, Hgb S 80.5% other 3.7%  8/23/21 WBC 9.59, Hgb 7.9, .4, , Cr 0.74, Tbili 1.5, , Folic acid 10.48, Ferritin 1460, UA protein neg, Hgb A2 3.5% F 10.8% S 82.6%  10/15/21 Hg A2 3.2% Hg F 9.2% Hg S 84.5% Cr 0.67 Alb 4.5 Tbili 1.8  Ferritin 1850 Folate 11.45 WBC 10.47 Hg 8.5  Retic 192k UA protein neg  12/8/21 Hg A 18.1% A2 3.1% F 7.7% S 71.1%, , Ferritin 1654, Folic acid 13.44, WBC 10.39, Hgb 8.1, PLT 42, Retic 6.13%, Urine protein neg  2/3/22 Cr 0.75 Alb 4.7 TP 8.2 Tbili 2.9  Ferritin 1729 Folate 15.9 WBC 11.75 Hg 7.4 RBC 2.25 MCV 97.8 RDW 17.9  Retic 5.85% Urine protein neg Hg A 10.6% Hg F 8.3% Hg S 78.1% Hg A2 3%  4/25/22 Cr 0.77, Alb 4.5, TP 7.8, Tbili 1.8, , Ferritin 1584, folic acid 15, WBC 9.90, Hgb 8.2, , ANC 3.77, urine protein 10.47 mg/dL, hgb electrophoresis pending**  - 10/21/2022:  WBC 15.63, hemoglobin 7.4, hematocrit 21.1, platelet count 454K  - 12/01/2022: WBC 13.36, hemoglobin 7.5, hematocrit 22.6, platelet count 4 4 6.  MCV 95.8    -04/18/23: Chloride 109, cr 0.71, alb 4.5, ca 9.5, TB 2.4, , LFTs WNL, Ferritin 1457, uric acid 7.2, wbc 11.87, hgb 8.0, plt 508, ANC 4.51, retic 9.39,   -5/19/23 CMP WNL except bili 2.9, , ferritin 1467, folate 16, UA 8.1 vit D 4, , WBC 15.87, HGB 6.9, HCT 20,  , retic 9.46,   06/20/2023:  WBC count 10.3, hemoglobin 8.5, MCV 97.6, platelet count 5 110, ANC 5.9, creatinine 0.72, albumin 4.6, calcium 9.2, LFTs within normal limits, bilirubin 1.7, ferritin 1261, hemoglobin F was ordered but not done.  7/29/2023:  CMP:  abnormal only:  Cl 110, bilirubin 1.9.  , Ferritin 1449, Uric acid 7.5, Vit D 6 ng/mL,  CBC:  abnormal only:  WBC 13.87, Hgb 7.7, Hct 22.1, MCV 98.7, RDW-CV 16.6, platelets 387,000, retic 8.54, Lymph 6.44, mono 1.18.   8/30/2023:  CMP:  Cl- 109, BUN 6.97, creatinine 0.78,  Total Bili 2.1, , Ferritin 1575.  CBC:  WBC 11.66, Hgb 7.9, Hct 23.5, platelets  "428,000, Retic 8.23.  8/23/2023: Hgb F 6.1.  9/26/2023:  Cl- 111, Creatinine 0.84, Total Bili 2.4, , Ferritin 2215, WBC 16.06, Hgb 7.0, Hct 21.0, Plt 545,000, retic 4.18, Hgb F 5.4.    Imaging:  reviewed, X-Rays of b/l hips 11/2019 no AVN, CXR unremarkable    Assessment:   1. Sickle cell disease D57.1 C/w hydrea 1500mg daily, Hg F not at goal on most recent lab work, Hgb electrophoresis obtained today still pending  C/w folic acid 1mg daily  Adequate PO hydration  Analgesia with fentanyl and percocet 10mg as needed. C/w fentanyl 100mcg/hr,   Avoid overtransfusion. Only transfuse if symptomatic anemia. Do not transfuse for simple pain crisis  Of note, she did receive 3 days of Desferal and was transfused 1 unit PRBC during hospitalization at [facility] 11/2021.  Desferal x 10 days planned for 3/2022 but patient decided against Desferal, will continue to monitor ferritin closely and will Obtain hg electrophoresis, ferritin, iron, tibc, CBC w/ diff, CMP, folate, retic, UA for urine protein 1 week prior to return visit  Unknown prior vaccinations. She believes she last got PNA vaccinations ~5 years ago.  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  Chronic leg, back, and arm discomfort she rates her pain an "6" on 0-10 pain scale.   she continues managing at home with good hydration, diet, rest, and pain medication.   She is requesting pain medication refills, along with all daily medications to be refilled at this visit.    Reports compliance with Folic acid, Hydrea, and Jadence.    Plan:   10/04/2023:    - Reinforced medication compliance, good oral hydration, diet, and rest for current pain crisis.  - Continue folic acid 1 mg daily,  - Continue hydroxyurea 1500 daily  - Continue Jadenu 1080 daily  - C/w antinausea " meds  - Continue with pain medication for all pain crisis.  No pain meds refilled today.  Refill is scheduled for 10/14/2023, per .  Refilled all daily medications today.  -- Repeat labs on follow up including hemoglobin electrophoresis- RTC 4 WEEKS  Patient to call Medicaid for list of PCPs.    The patient is in agreement with today's plan of care.  All questions answered.  Patient is aware to contact our office for any problems or concerns prior to next visit.      Vivian Hankins, MSN-ED, APRN, AGACNP-BC, OCN

## 2023-10-10 DIAGNOSIS — D57.00 HB-SS DISEASE WITH CRISIS: ICD-10-CM

## 2023-10-10 DIAGNOSIS — D57.00 SICKLE CELL PAIN CRISIS: ICD-10-CM

## 2023-10-11 RX ORDER — OXYCODONE AND ACETAMINOPHEN 10; 325 MG/1; MG/1
1 TABLET ORAL EVERY 6 HOURS PRN
Qty: 120 TABLET | Refills: 0 | Status: SHIPPED | OUTPATIENT
Start: 2023-10-13 | End: 2023-11-08 | Stop reason: SDUPTHER

## 2023-10-11 RX ORDER — FENTANYL 100 UG/H
PATCH TRANSDERMAL
Qty: 10 PATCH | Refills: 0 | Status: SHIPPED | OUTPATIENT
Start: 2023-10-13 | End: 2023-11-08 | Stop reason: SDUPTHER

## 2023-10-18 DIAGNOSIS — D57.00 HB-SS DISEASE WITH CRISIS: ICD-10-CM

## 2023-10-18 DIAGNOSIS — D57.1 SICKLING DISORDER DUE TO HEMOGLOBIN S WITHOUT CRISIS: ICD-10-CM

## 2023-10-18 DIAGNOSIS — D57.00 SICKLE CELL PAIN CRISIS: ICD-10-CM

## 2023-10-18 DIAGNOSIS — E83.111 IRON OVERLOAD DUE TO REPEATED RED BLOOD CELL TRANSFUSIONS: ICD-10-CM

## 2023-10-18 NOTE — TELEPHONE ENCOUNTER
She left a message asking for refills as well as medication for an ear infection. With her having the medicaid as her insurance Dr Hdz is the only doctor that is locked in to give her meds. That is why she is asking for something for her ear infection. Please advise

## 2023-10-19 RX ORDER — HYDROXYUREA 500 MG/1
1500 CAPSULE ORAL DAILY
Qty: 90 CAPSULE | Refills: 5 | Status: SHIPPED | OUTPATIENT
Start: 2023-10-19 | End: 2023-11-08 | Stop reason: SDUPTHER

## 2023-10-19 RX ORDER — FOLIC ACID 1 MG/1
1 TABLET ORAL DAILY
Qty: 90 TABLET | Refills: 3 | Status: SHIPPED | OUTPATIENT
Start: 2023-10-19 | End: 2023-11-08 | Stop reason: SDUPTHER

## 2023-10-19 RX ORDER — DEFERASIROX 360 MG/1
1080 TABLET, FILM COATED ORAL DAILY
Qty: 90 TABLET | Refills: 3 | Status: SHIPPED | OUTPATIENT
Start: 2023-10-19 | End: 2023-11-08 | Stop reason: SDUPTHER

## 2023-10-19 RX ORDER — PROMETHAZINE HYDROCHLORIDE 25 MG/1
12.5 TABLET ORAL EVERY 6 HOURS PRN
Qty: 30 TABLET | Refills: 1 | Status: SHIPPED | OUTPATIENT
Start: 2023-10-19 | End: 2023-11-08 | Stop reason: SDUPTHER

## 2023-11-07 NOTE — PROGRESS NOTES
"  Diagnosis:  - SCD    Treatment History:  - Folic acid, Hydrea, Jadenu    Current Treatment:   - hydrea 1500 mg daily  - Jadenu 1080 mg daily  - Folic acid 1 mg daily        Subjective:       LAUREN Joyner Matt Leal  33 y.o.  female with past medical history significant for SCD here for follow-up  According to the patient she had been on folic acid, hydroxyurea and jadenu in the past.  Patient had not been taking her HU and Jadenu recently.  Patient had multiple transfusions over the year, requiring almost 10 transfusion last year.  Denies any history of exchange transfusion or acute chest syndrome.  Denies any history of avascular necrosis.  Denies any history of stroke. No ACS ever in her life never intubated Never full exchange transfusions >20 lifetime transfusions, in 2019 pt reports ~10 units of PRBC overall  No AVN  Never told she has iron overload and has never been on iron chelation prior to initial presentation  No strokes  Not on any pediatric transfusion regimen  >10 pain crisis per year (occur almost with every cycle requiring frequent hospital visits)  hospitalization 2/2021, had partial exchange of 1 unit  Most recent hospitalization 3/2022, did not receive any blood transfusions         Interval History:   11/8/2023:  Ms. Leal is here today for her follow-up regarding her Sickle Cell Disease.  She reports pain crisis the last week of October, which involved a hospital admission for 2 days along with 2 units of PRBCs, IV pain medication, and IVFs.  She states, she believes that an ear infection caused her crisis.  She continues to have pain in the left ear, which she states, she never received oral antibiotics due to her insurance.  I will send a 7-day supply of Amoxicillin/Clavulante 875/125 Q 12 x 7 days.  She is requesting all sickle cell medications, pain medications, and Protonix be refilled today.  She reports chronic leg, back, and arm discomfort she rates her pain an "4-5" on 0-10 " pain scale.  She confirms good hydration, diet, and rest, since her hospital discharge.  She denies any ACS, confirms back, leg, and arm pain, denies any stroke like symptoms, fever, infections, reports grade 1 fatigue.  She has gained a couple of pounds since her last visit.  She reports compliance with Folic acid, Hydrea, and Jadence.  No labs for this visit.  Reviewed hospital labs.    Past Medical History:   Diagnosis Date    Encounter for blood transfusion     Flu 04/2019    sore throat    Sickle cell anemia       Past Surgical History:   Procedure Laterality Date    MEDIPORT INSERTION, SINGLE      TUBAL LIGATION       Social History     Socioeconomic History    Marital status:    Tobacco Use    Smoking status: Never    Smokeless tobacco: Never   Substance and Sexual Activity    Alcohol use: No    Drug use: No    Sexual activity: Yes     Partners: Male      Family History   Problem Relation Age of Onset    Hypertension Mother       Review of patient's allergies indicates:   Allergen Reactions    Aspirin     Butorphanol     Compazine [prochlorperazine edisylate]     Ketorolac      Other reaction(s): other    Morpholine analogues     Nubain [nalbuphine]     Stadol [butorphanol tartrate]     Tramadol Rash     hives      CONSTITUTIONAL: no fevers, no chills, no weight loss, + fatigue, no weakness  HEMATOLOGIC: no abnormal bleeding, no abnormal bruising, no drenching night sweats  ONCOLOGIC: no new masses or lumps  HEENT: no vision loss, no tinnitus or hearing loss, no nose bleeding, no dysphagia, no odynophagia  CVS: no chest pain, no palpitations, no dyspnea on exertion  RESP: no shortness of breath, no hemoptysis, no cough  GI: + nausea, no vomiting, no diarrhea, no constipation, no melena, no hematochezia, no hematemesis, no abdominal pain, no increase in abdominal girth  : no dysuria, no hematuria, no hesitancy, no scrotal swelling, no discharge  INTEGUMENT: no rashes, no abnormal bruising, no nail  pitting, no hyperpigmentation  NEURO: no falls, no memory loss, no paresthesias or dysesthesias, no urofecal incontinence or retention, no loss of strength on any extremity  MSK: + muscle, bone, and joint pain all extremities and back.    PSYCH: no suicidal or homicidal ideation, no depression, no insomnia, no anhedonia  ENDOCRINE: no heat or cold intolerance, no polyuria, no polydipsia    Objective:        Vitals:    11/08/23 1356   BP: 126/80   Pulse: 98   Resp: 20   Temp: 98.7 °F (37.1 °C)     ECOG PS 0  GA: AAOx3, NAD  HEENT:  Scleral icterus EOMI, good dentition, moist oral mucous membranes  LYMPH: no cervical, axillary or supraclavicular adenopathy  CVS: s1s2 RRR, no M/R/G, port in place without surrounding edema or erythema  RESP: CTA b/l, no crackles, no wheezes or rhonchi  ABD: soft, NT, ND, BS+, no hepatosplenomegaly  EXT: no deformities, no pedal edema  SKIN: no rashes, warm and dry  NEURO: normal mentation, strength 5/5 on all 4 extremities, no sensory deficits      LABS / IMAGING      Other labs reviewed, Hg ranged 7-10, normal Cr  9/5/17 Hg A 9.3 Hg A2 3.8 Hg F 3.6 Hg S 83.3  6/3/2020 hgb 9.5, WBC 10.7, , MCV 96.7, RDW 18.4 Hg A 30.6 Hg F 5.2% Hg S 60.7%  8/10/20 Hg F 7.8% Hg A 10.8% Hg S 77.4% ferritin 2997 Cr 0.64 Hg 9 .3  ANC 7.9  10/5/20 Cr 0.63, Tbili 2.4, , Ferritin 2556, WBC 8.23, Hgb 8.9, .5, , retic 8.41, ANC 4.78, Urine Protein 11.56, Hgb A 0% A21 3.8% F 8.9% S 83.5%  11/23/20 Cr 0.69, TP 8.5, ALB 4.6, alk phos 77, T bili 2.2, , iron 151, TIBC 194, iron sat 78%, ferritin 2746, WBC 8.47, Hgb 9.2, , retic 26K, ANC 5.69, urine protein 40.21, Hgb F 10.2, Hgb S 83.8  1/27/21 Hg A 0% A2 3.8 F 11.3% S 82.5% Other 2.4% Cr 0.61 Alb 4.3 TP 7.8 Tbili 2.1 AST 19 ALT 10  Iron 128 Ferritin 2218 WBC 9.87 Hg 9.1 .2  ANC 6.6 Abs retic 125k urine protein 12.17 mg/dl  3/31/21 Cr 0.69, TP 8.1, T bili 2.2, , Ferritin 2166, Folic  acid 19.68, WBC 8.23, Hgb 8.0, .3, , ANC 3.90, abs retic ~175K, urine protein neg, urine culture neg, Hgb A 0%, Hgb A2 4.1%, Hgb F 11.7%, Hgb S 80.5% other 3.7%  8/23/21 WBC 9.59, Hgb 7.9, .4, , Cr 0.74, Tbili 1.5, , Folic acid 10.48, Ferritin 1460, UA protein neg, Hgb A2 3.5% F 10.8% S 82.6%  10/15/21 Hg A2 3.2% Hg F 9.2% Hg S 84.5% Cr 0.67 Alb 4.5 Tbili 1.8  Ferritin 1850 Folate 11.45 WBC 10.47 Hg 8.5  Retic 192k UA protein neg  12/8/21 Hg A 18.1% A2 3.1% F 7.7% S 71.1%, , Ferritin 1654, Folic acid 13.44, WBC 10.39, Hgb 8.1, PLT 42, Retic 6.13%, Urine protein neg  2/3/22 Cr 0.75 Alb 4.7 TP 8.2 Tbili 2.9  Ferritin 1729 Folate 15.9 WBC 11.75 Hg 7.4 RBC 2.25 MCV 97.8 RDW 17.9  Retic 5.85% Urine protein neg Hg A 10.6% Hg F 8.3% Hg S 78.1% Hg A2 3%  4/25/22 Cr 0.77, Alb 4.5, TP 7.8, Tbili 1.8, , Ferritin 1584, folic acid 15, WBC 9.90, Hgb 8.2, , ANC 3.77, urine protein 10.47 mg/dL, hgb electrophoresis pending**  - 10/21/2022:  WBC 15.63, hemoglobin 7.4, hematocrit 21.1, platelet count 454K  - 12/01/2022: WBC 13.36, hemoglobin 7.5, hematocrit 22.6, platelet count 4 4 6.  MCV 95.8    -04/18/23: Chloride 109, cr 0.71, alb 4.5, ca 9.5, TB 2.4, , LFTs WNL, Ferritin 1457, uric acid 7.2, wbc 11.87, hgb 8.0, plt 508, ANC 4.51, retic 9.39,   -5/19/23 CMP WNL except bili 2.9, , ferritin 1467, folate 16, UA 8.1 vit D 4, , WBC 15.87, HGB 6.9, HCT 20,  , retic 9.46,   06/20/2023:  WBC count 10.3, hemoglobin 8.5, MCV 97.6, platelet count 5 110, ANC 5.9, creatinine 0.72, albumin 4.6, calcium 9.2, LFTs within normal limits, bilirubin 1.7, ferritin 1261, hemoglobin F was ordered but not done.  7/29/2023:  CMP:  abnormal only:  Cl 110, bilirubin 1.9.  , Ferritin 1449, Uric acid 7.5, Vit D 6 ng/mL,  CBC:  abnormal only:  WBC 13.87, Hgb 7.7, Hct 22.1, MCV 98.7, RDW-CV 16.6, platelets 387,000, retic 8.54, Lymph 6.44, mono 1.18.    8/30/2023:  CMP:  Cl- 109, BUN 6.97, creatinine 0.78,  Total Bili 2.1, , Ferritin 1575.  CBC:  WBC 11.66, Hgb 7.9, Hct 23.5, platelets 428,000, Retic 8.23.  8/23/2023: Hgb F 6.1.  9/26/2023:  Cl- 111, Creatinine 0.84, Total Bili 2.4, , Ferritin 2215, WBC 16.06, Hgb 7.0, Hct 21.0, Plt 545,000, retic 4.18, Hgb F 5.4.    Imaging:  reviewed, X-Rays of b/l hips 11/2019 no AVN, CXR unremarkable    Assessment:   1. Sickle cell disease D57.1 C/w hydrea 1500mg daily, Hg F not at goal on most recent lab work, Hgb electrophoresis obtained today still pending  C/w folic acid 1mg daily  Adequate PO hydration  Analgesia with fentanyl and percocet 10mg as needed. C/w fentanyl 100mcg/hr,   Avoid overtransfusion. Only transfuse if symptomatic anemia. Do not transfuse for simple pain crisis  Of note, she did receive 3 days of Desferal and was transfused 1 unit PRBC during hospitalization at [facility] 11/2021.  Desferal x 10 days planned for 3/2022 but patient decided against Desferal, will continue to monitor ferritin closely and will Obtain hg electrophoresis, ferritin, iron, tibc, CBC w/ diff, CMP, folate, retic, UA for urine protein 1 week prior to return visit  Unknown prior vaccinations. She believes she last got PNA vaccinations ~5 years ago.  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  Received 2 units of PRBCs, IV pain medication and fluids.  Continues with left ear infection.  Continues with chronic leg, back, and arm discomfort   She is requesting pain medication refills, along with all daily medications to be refilled at this visit, and medication for acid reflux.  Reports compliance with Folic acid, Hydrea, and Jadence.    Plan:   11/8/2023:    - Reinforced medication  compliance, good oral hydration, diet, and rest for current pain crisis.  - Continue folic acid 1 mg daily,  - Continue hydroxyurea 1500 daily  - Continue Jadenu 1080 daily  - C/w antinausea meds  - Continue with pain medication for all pain crisis.    - Refilled all daily medications today.  - Send new prescription for Protonix for acid reflux and Amoxicillian/Clav x 7 days for ear infection.  - Refilled pain medication today after checking LA .  - Referral to GI for new complaint of pain in the lower esophagus and acid reflux.  -- Repeat labs on follow up including hemoglobin electrophoresis- RTC 4 WEEKS    The patient is in agreement with today's plan of care.  All questions answered.  Patient is aware to contact our office for any problems or concerns prior to next visit.      Vivian Hankins, MSN-ED, APRN, AGACNP-BC, OCN

## 2023-11-08 ENCOUNTER — OFFICE VISIT (OUTPATIENT)
Dept: HEMATOLOGY/ONCOLOGY | Facility: CLINIC | Age: 33
End: 2023-11-08
Payer: MEDICAID

## 2023-11-08 VITALS
HEART RATE: 98 BPM | TEMPERATURE: 99 F | BODY MASS INDEX: 26.27 KG/M2 | HEIGHT: 67 IN | OXYGEN SATURATION: 99 % | DIASTOLIC BLOOD PRESSURE: 80 MMHG | WEIGHT: 167.38 LBS | RESPIRATION RATE: 20 BRPM | SYSTOLIC BLOOD PRESSURE: 126 MMHG

## 2023-11-08 DIAGNOSIS — Z51.5 PALLIATIVE CARE ENCOUNTER: ICD-10-CM

## 2023-11-08 DIAGNOSIS — D57.00 SICKLE CELL PAIN CRISIS: ICD-10-CM

## 2023-11-08 DIAGNOSIS — E83.111 IRON OVERLOAD DUE TO REPEATED RED BLOOD CELL TRANSFUSIONS: ICD-10-CM

## 2023-11-08 DIAGNOSIS — K21.9 GASTROESOPHAGEAL REFLUX DISEASE, UNSPECIFIED WHETHER ESOPHAGITIS PRESENT: Primary | ICD-10-CM

## 2023-11-08 DIAGNOSIS — D57.00 HB-SS DISEASE WITH CRISIS: ICD-10-CM

## 2023-11-08 DIAGNOSIS — H66.90 OTITIS MEDIA, UNSPECIFIED LATERALITY, UNSPECIFIED OTITIS MEDIA TYPE: ICD-10-CM

## 2023-11-08 DIAGNOSIS — D57.1 SICKLING DISORDER DUE TO HEMOGLOBIN S WITHOUT CRISIS: ICD-10-CM

## 2023-11-08 PROCEDURE — 1160F RVW MEDS BY RX/DR IN RCRD: CPT | Mod: CPTII,,, | Performed by: NURSE PRACTITIONER

## 2023-11-08 PROCEDURE — 99215 OFFICE O/P EST HI 40 MIN: CPT | Mod: ,,, | Performed by: NURSE PRACTITIONER

## 2023-11-08 PROCEDURE — 3079F DIAST BP 80-89 MM HG: CPT | Mod: CPTII,,, | Performed by: NURSE PRACTITIONER

## 2023-11-08 PROCEDURE — 3008F PR BODY MASS INDEX (BMI) DOCUMENTED: ICD-10-PCS | Mod: CPTII,,, | Performed by: NURSE PRACTITIONER

## 2023-11-08 PROCEDURE — 1160F PR REVIEW ALL MEDS BY PRESCRIBER/CLIN PHARMACIST DOCUMENTED: ICD-10-PCS | Mod: CPTII,,, | Performed by: NURSE PRACTITIONER

## 2023-11-08 PROCEDURE — 1159F MED LIST DOCD IN RCRD: CPT | Mod: CPTII,,, | Performed by: NURSE PRACTITIONER

## 2023-11-08 PROCEDURE — 3074F PR MOST RECENT SYSTOLIC BLOOD PRESSURE < 130 MM HG: ICD-10-PCS | Mod: CPTII,,, | Performed by: NURSE PRACTITIONER

## 2023-11-08 PROCEDURE — 1159F PR MEDICATION LIST DOCUMENTED IN MEDICAL RECORD: ICD-10-PCS | Mod: CPTII,,, | Performed by: NURSE PRACTITIONER

## 2023-11-08 PROCEDURE — 3074F SYST BP LT 130 MM HG: CPT | Mod: CPTII,,, | Performed by: NURSE PRACTITIONER

## 2023-11-08 PROCEDURE — 99215 PR OFFICE/OUTPT VISIT, EST, LEVL V, 40-54 MIN: ICD-10-PCS | Mod: ,,, | Performed by: NURSE PRACTITIONER

## 2023-11-08 PROCEDURE — 3079F PR MOST RECENT DIASTOLIC BLOOD PRESSURE 80-89 MM HG: ICD-10-PCS | Mod: CPTII,,, | Performed by: NURSE PRACTITIONER

## 2023-11-08 PROCEDURE — 3008F BODY MASS INDEX DOCD: CPT | Mod: CPTII,,, | Performed by: NURSE PRACTITIONER

## 2023-11-08 RX ORDER — HYDROXYUREA 500 MG/1
1500 CAPSULE ORAL DAILY
Qty: 90 CAPSULE | Refills: 5 | Status: SHIPPED | OUTPATIENT
Start: 2023-11-08

## 2023-11-08 RX ORDER — AMOXICILLIN AND CLAVULANATE POTASSIUM 875; 125 MG/1; MG/1
1 TABLET, FILM COATED ORAL 2 TIMES DAILY
Qty: 14 TABLET | Refills: 0 | Status: SHIPPED | OUTPATIENT
Start: 2023-11-08 | End: 2023-11-15

## 2023-11-08 RX ORDER — DEFERASIROX 360 MG/1
1080 TABLET, FILM COATED ORAL DAILY
Qty: 90 TABLET | Refills: 3 | Status: SHIPPED | OUTPATIENT
Start: 2023-11-08

## 2023-11-08 RX ORDER — OXYCODONE AND ACETAMINOPHEN 10; 325 MG/1; MG/1
1 TABLET ORAL EVERY 6 HOURS PRN
Qty: 120 TABLET | Refills: 0 | Status: SHIPPED | OUTPATIENT
Start: 2023-11-08 | End: 2023-12-08 | Stop reason: SDUPTHER

## 2023-11-08 RX ORDER — PANTOPRAZOLE SODIUM 40 MG/1
40 TABLET, DELAYED RELEASE ORAL DAILY
Qty: 120 TABLET | Refills: 1 | Status: SHIPPED | OUTPATIENT
Start: 2023-11-08 | End: 2024-01-04 | Stop reason: SDUPTHER

## 2023-11-08 RX ORDER — ONDANSETRON 4 MG/1
8 TABLET, FILM COATED ORAL EVERY 8 HOURS PRN
Qty: 30 TABLET | Refills: 3 | Status: SHIPPED | OUTPATIENT
Start: 2023-11-08

## 2023-11-08 RX ORDER — PROMETHAZINE HYDROCHLORIDE 25 MG/1
12.5 TABLET ORAL EVERY 6 HOURS PRN
Qty: 30 TABLET | Refills: 1 | Status: SHIPPED | OUTPATIENT
Start: 2023-11-08 | End: 2024-01-04 | Stop reason: SDUPTHER

## 2023-11-08 RX ORDER — FENTANYL 100 UG/H
PATCH TRANSDERMAL
Qty: 10 PATCH | Refills: 0 | Status: SHIPPED | OUTPATIENT
Start: 2023-11-08 | End: 2023-12-07 | Stop reason: SDUPTHER

## 2023-11-08 RX ORDER — FOLIC ACID 1 MG/1
1 TABLET ORAL DAILY
Qty: 90 TABLET | Refills: 3 | Status: SHIPPED | OUTPATIENT
Start: 2023-11-08 | End: 2024-01-04 | Stop reason: SDUPTHER

## 2023-12-07 ENCOUNTER — OFFICE VISIT (OUTPATIENT)
Dept: HEMATOLOGY/ONCOLOGY | Facility: CLINIC | Age: 33
End: 2023-12-07
Payer: MEDICAID

## 2023-12-07 VITALS
BODY MASS INDEX: 26.53 KG/M2 | WEIGHT: 169 LBS | HEART RATE: 108 BPM | SYSTOLIC BLOOD PRESSURE: 118 MMHG | OXYGEN SATURATION: 99 % | HEIGHT: 67 IN | DIASTOLIC BLOOD PRESSURE: 76 MMHG | TEMPERATURE: 98 F

## 2023-12-07 DIAGNOSIS — E83.111 IRON OVERLOAD DUE TO REPEATED RED BLOOD CELL TRANSFUSIONS: Primary | ICD-10-CM

## 2023-12-07 DIAGNOSIS — D57.00 HB-SS DISEASE WITH CRISIS: ICD-10-CM

## 2023-12-07 DIAGNOSIS — K21.9 GASTROESOPHAGEAL REFLUX DISEASE, UNSPECIFIED WHETHER ESOPHAGITIS PRESENT: ICD-10-CM

## 2023-12-07 DIAGNOSIS — D57.00 SICKLE CELL PAIN CRISIS: ICD-10-CM

## 2023-12-07 DIAGNOSIS — D57.1 SICKLING DISORDER DUE TO HEMOGLOBIN S WITHOUT CRISIS: ICD-10-CM

## 2023-12-07 PROCEDURE — 3074F SYST BP LT 130 MM HG: CPT | Mod: CPTII,,, | Performed by: NURSE PRACTITIONER

## 2023-12-07 PROCEDURE — 99214 PR OFFICE/OUTPT VISIT, EST, LEVL IV, 30-39 MIN: ICD-10-PCS | Mod: ,,, | Performed by: NURSE PRACTITIONER

## 2023-12-07 PROCEDURE — 99214 OFFICE O/P EST MOD 30 MIN: CPT | Mod: ,,, | Performed by: NURSE PRACTITIONER

## 2023-12-07 PROCEDURE — 1159F PR MEDICATION LIST DOCUMENTED IN MEDICAL RECORD: ICD-10-PCS | Mod: CPTII,,, | Performed by: NURSE PRACTITIONER

## 2023-12-07 PROCEDURE — 3078F DIAST BP <80 MM HG: CPT | Mod: CPTII,,, | Performed by: NURSE PRACTITIONER

## 2023-12-07 PROCEDURE — 1160F PR REVIEW ALL MEDS BY PRESCRIBER/CLIN PHARMACIST DOCUMENTED: ICD-10-PCS | Mod: CPTII,,, | Performed by: NURSE PRACTITIONER

## 2023-12-07 PROCEDURE — 1159F MED LIST DOCD IN RCRD: CPT | Mod: CPTII,,, | Performed by: NURSE PRACTITIONER

## 2023-12-07 PROCEDURE — 3008F PR BODY MASS INDEX (BMI) DOCUMENTED: ICD-10-PCS | Mod: CPTII,,, | Performed by: NURSE PRACTITIONER

## 2023-12-07 PROCEDURE — 1160F RVW MEDS BY RX/DR IN RCRD: CPT | Mod: CPTII,,, | Performed by: NURSE PRACTITIONER

## 2023-12-07 PROCEDURE — 3078F PR MOST RECENT DIASTOLIC BLOOD PRESSURE < 80 MM HG: ICD-10-PCS | Mod: CPTII,,, | Performed by: NURSE PRACTITIONER

## 2023-12-07 PROCEDURE — 3074F PR MOST RECENT SYSTOLIC BLOOD PRESSURE < 130 MM HG: ICD-10-PCS | Mod: CPTII,,, | Performed by: NURSE PRACTITIONER

## 2023-12-07 PROCEDURE — 3008F BODY MASS INDEX DOCD: CPT | Mod: CPTII,,, | Performed by: NURSE PRACTITIONER

## 2023-12-07 RX ORDER — FENTANYL 100 UG/H
PATCH TRANSDERMAL
Qty: 10 PATCH | Refills: 0 | Status: SHIPPED | OUTPATIENT
Start: 2023-12-07 | End: 2024-01-04 | Stop reason: SDUPTHER

## 2023-12-07 NOTE — PROGRESS NOTES
"  Diagnosis:  - SCD    Treatment History:  - Folic acid, Hydrea, Jadenu    Current Treatment:   - hydrea 1500 mg daily  - Jadenu 1080 mg daily  - Folic acid 1 mg daily        Subjective:       HPI  Anya Matt Leal  33 y.o.  female with past medical history significant for SCD here for follow-up  According to the patient she had been on folic acid, hydroxyurea and jadenu in the past.  Patient had not been taking her HU and Jadenu recently.  Patient had multiple transfusions over the year, requiring almost 10 transfusion last year.  Denies any history of exchange transfusion or acute chest syndrome.  Denies any history of avascular necrosis.  Denies any history of stroke. No ACS ever in her life never intubated Never full exchange transfusions >20 lifetime transfusions, in 2019 pt reports ~10 units of PRBC overall  No AVN  Never told she has iron overload and has never been on iron chelation prior to initial presentation  No strokes  Not on any pediatric transfusion regimen  >10 pain crisis per year (occur almost with every cycle requiring frequent hospital visits)  hospitalization 2/2021, had partial exchange of 1 unit  October 2023 reports pain crisis the last week of October, which involved a hospital admission for 2 days along with 2 units of PRBCs, IV pain medication, and IVFs.    Interval History:   12/7/2023:  Ms. 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  She report going to the ER today for IV fluids and pain medication.  She is requesting all sickle cell medications and pain medications be refilled today.  She reports chronic leg and back pain today.  She rates her pain an "5" on 0-10 pain scale.  She confirms good hydration, diet, but states, she is having trouble sleeping.  We discussed establishing a regular pattern of going to bed, no computer time 1 hour prior, and no TV.   She denies any fever, chills, headaches, confirms fatigue, no dizziness, cough, " SOB, chest pain, pruritus, abnormal bleeding, rash, nausea, weight loss, abdominal pain, flank pain, change in bowel or bladder habits, no lower extremity pain or swelling, no leg ulcers, and no neuropathy.  .  She has gained a couple of pounds since her last visit.  She reports compliance with Folic acid, Hydrea, and Jadence.   Labs reviewed with patient dated 12/06/2023:  Creatinine 0.80, Total Bili 2.9, AST and ALT WNL, , ferritin 1637, folic acid 9.47, WBC 12.65, Hgb 7.6, Hct 22.4, Plt 413,000.  No protein electrophoresis.    Past Medical History:   Diagnosis Date    Encounter for blood transfusion     Flu 04/2019    sore throat    Sickle cell anemia       Past Surgical History:   Procedure Laterality Date    MEDIPORT INSERTION, SINGLE      TUBAL LIGATION       Social History     Socioeconomic History    Marital status:    Tobacco Use    Smoking status: Never    Smokeless tobacco: Never   Substance and Sexual Activity    Alcohol use: No    Drug use: No    Sexual activity: Yes     Partners: Male      Family History   Problem Relation Age of Onset    Hypertension Mother       Review of patient's allergies indicates:   Allergen Reactions    Aspirin     Butorphanol     Compazine [prochlorperazine edisylate]     Ketorolac      Other reaction(s): other    Morpholine analogues     Nubain [nalbuphine]     Stadol [butorphanol tartrate]     Tramadol Rash     hives      CONSTITUTIONAL: no fevers, no chills, no weight loss, + fatigue, no weakness  HEMATOLOGIC: no abnormal bleeding, no abnormal bruising, no drenching night sweats  ONCOLOGIC: no new masses or lumps  HEENT: no vision loss, no tinnitus or hearing loss, no nose bleeding, no dysphagia, no odynophagia  CVS: no chest pain, no palpitations, no dyspnea on exertion  RESP: no shortness of breath, no hemoptysis, no cough  GI: + nausea, no vomiting, no diarrhea, no constipation, no melena, no hematochezia, no hematemesis, no abdominal pain, no increase in  abdominal girth  : no dysuria, no hematuria, no hesitancy, no scrotal swelling, no discharge  INTEGUMENT: no rashes, no abnormal bruising, no nail pitting, no hyperpigmentation  NEURO: no falls, no memory loss, no paresthesias or dysesthesias, no urofecal incontinence or retention, no loss of strength on any extremity  MSK: + muscle, bone, and joint pain all extremities and back.    PSYCH: no suicidal or homicidal ideation, no depression, no insomnia, no anhedonia  ENDOCRINE: no heat or cold intolerance, no polyuria, no polydipsia    Objective:        Vitals:    12/07/23 1102   BP: 118/76   Pulse: 108   Temp: 98.3 °F (36.8 °C)       ECOG PS 0  GA: AAOx3, NAD  HEENT:  Scleral icterus EOMI, good dentition, moist oral mucous membranes  LYMPH: no cervical, axillary or supraclavicular adenopathy  CVS: s1s2 RRR, no M/R/G, port in place without surrounding edema or erythema  RESP: CTA b/l, no crackles, no wheezes or rhonchi  ABD: soft, NT, ND, BS+, no hepatosplenomegaly  EXT: no deformities, no pedal edema  SKIN: no rashes, warm and dry  NEURO: normal mentation, strength 5/5 on all 4 extremities, no sensory deficits      LABS / IMAGING      Other labs reviewed, Hg ranged 7-10, normal Cr  9/5/17 Hg A 9.3 Hg A2 3.8 Hg F 3.6 Hg S 83.3  6/3/2020 hgb 9.5, WBC 10.7, , MCV 96.7, RDW 18.4 Hg A 30.6 Hg F 5.2% Hg S 60.7%  8/10/20 Hg F 7.8% Hg A 10.8% Hg S 77.4% ferritin 2997 Cr 0.64 Hg 9 .3  ANC 7.9  10/5/20 Cr 0.63, Tbili 2.4, , Ferritin 2556, WBC 8.23, Hgb 8.9, .5, , retic 8.41, ANC 4.78, Urine Protein 11.56, Hgb A 0% A21 3.8% F 8.9% S 83.5%  11/23/20 Cr 0.69, TP 8.5, ALB 4.6, alk phos 77, T bili 2.2, , iron 151, TIBC 194, iron sat 78%, ferritin 2746, WBC 8.47, Hgb 9.2, , retic 26K, ANC 5.69, urine protein 40.21, Hgb F 10.2, Hgb S 83.8  1/27/21 Hg A 0% A2 3.8 F 11.3% S 82.5% Other 2.4% Cr 0.61 Alb 4.3 TP 7.8 Tbili 2.1 AST 19 ALT 10  Iron 128 Ferritin 2218 WBC 9.87 Hg  9.1 .2  ANC 6.6 Abs retic 125k urine protein 12.17 mg/dl  3/31/21 Cr 0.69, TP 8.1, T bili 2.2, , Ferritin 2166, Folic acid 19.68, WBC 8.23, Hgb 8.0, .3, , ANC 3.90, abs retic ~175K, urine protein neg, urine culture neg, Hgb A 0%, Hgb A2 4.1%, Hgb F 11.7%, Hgb S 80.5% other 3.7%  8/23/21 WBC 9.59, Hgb 7.9, .4, , Cr 0.74, Tbili 1.5, , Folic acid 10.48, Ferritin 1460, UA protein neg, Hgb A2 3.5% F 10.8% S 82.6%  10/15/21 Hg A2 3.2% Hg F 9.2% Hg S 84.5% Cr 0.67 Alb 4.5 Tbili 1.8  Ferritin 1850 Folate 11.45 WBC 10.47 Hg 8.5  Retic 192k UA protein neg  12/8/21 Hg A 18.1% A2 3.1% F 7.7% S 71.1%, , Ferritin 1654, Folic acid 13.44, WBC 10.39, Hgb 8.1, PLT 42, Retic 6.13%, Urine protein neg  2/3/22 Cr 0.75 Alb 4.7 TP 8.2 Tbili 2.9  Ferritin 1729 Folate 15.9 WBC 11.75 Hg 7.4 RBC 2.25 MCV 97.8 RDW 17.9  Retic 5.85% Urine protein neg Hg A 10.6% Hg F 8.3% Hg S 78.1% Hg A2 3%  4/25/22 Cr 0.77, Alb 4.5, TP 7.8, Tbili 1.8, , Ferritin 1584, folic acid 15, WBC 9.90, Hgb 8.2, , ANC 3.77, urine protein 10.47 mg/dL, hgb electrophoresis pending**  - 10/21/2022:  WBC 15.63, hemoglobin 7.4, hematocrit 21.1, platelet count 454K  - 12/01/2022: WBC 13.36, hemoglobin 7.5, hematocrit 22.6, platelet count 4 4 6.  MCV 95.8    -04/18/23: Chloride 109, cr 0.71, alb 4.5, ca 9.5, TB 2.4, , LFTs WNL, Ferritin 1457, uric acid 7.2, wbc 11.87, hgb 8.0, plt 508, ANC 4.51, retic 9.39,   -5/19/23 CMP WNL except bili 2.9, , ferritin 1467, folate 16, UA 8.1 vit D 4, , WBC 15.87, HGB 6.9, HCT 20,  , retic 9.46,   06/20/2023:  WBC count 10.3, hemoglobin 8.5, MCV 97.6, platelet count 5 110, ANC 5.9, creatinine 0.72, albumin 4.6, calcium 9.2, LFTs within normal limits, bilirubin 1.7, ferritin 1261, hemoglobin F was ordered but not done.  7/29/2023:  CMP:  abnormal only:  Cl 110, bilirubin 1.9.  , Ferritin 1449, Uric acid 7.5, Vit D 6  ng/mL,  CBC:  abnormal only:  WBC 13.87, Hgb 7.7, Hct 22.1, MCV 98.7, RDW-CV 16.6, platelets 387,000, retic 8.54, Lymph 6.44, mono 1.18.   8/30/2023:  CMP:  Cl- 109, BUN 6.97, creatinine 0.78,  Total Bili 2.1, , Ferritin 1575.  CBC:  WBC 11.66, Hgb 7.9, Hct 23.5, platelets 428,000, Retic 8.23.  8/23/2023: Hgb F 6.1.  9/26/2023:  Cl- 111, Creatinine 0.84, Total Bili 2.4, , Ferritin 2215, WBC 16.06, Hgb 7.0, Hct 21.0, Plt 545,000, retic 4.18, Hgb F 5.4.  12/06/2023:  Creatinine 0.80, Total Bili 2.9, AST and ALT WNL, , ferritin 1637, folic acid 9.47, WBC 12.65, Hgb 7.6, Hct 22.4, Plt 413,000.  No protein electrophoresis.      Imaging:  reviewed, X-Rays of b/l hips 11/2019 no AVN, CXR unremarkable    Assessment:   1. Sickle cell disease D57.1 C/w hydrea 1500mg daily, Hg F not at goal on most recent lab work, Hgb electrophoresis obtained today still pending  C/w folic acid 1mg daily  Adequate PO hydration  Analgesia with fentanyl and percocet 10mg as needed. C/w fentanyl 100mcg/hr,   Avoid overtransfusion. Only transfuse if symptomatic anemia. Do not transfuse for simple pain crisis  Of note, she did receive 3 days of Desferal and was transfused 1 unit PRBC during hospitalization at [facility] 11/2021.  Desferal x 10 days planned for 3/2022 but patient decided against Desferal, will continue to monitor ferritin closely and will Obtain hg electrophoresis, ferritin, iron, tibc, CBC w/ diff, CMP, folate, retic, UA for urine protein 1 week prior to return visit  Unknown prior vaccinations. She believes she last got PNA vaccinations ~5 years ago.  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  She is requesting pain medication refills, along with all daily medications to be refilled at this visit, and medication for acid  reflux.  Reports compliance with Folic acid, Hydrea, and Jadence.  Had ER visit today for pain medications and IV fluids.  Plan:   12/6/2023:    - Reinforced medication compliance, good oral hydration, diet, and rest for current pain crisis.  - Continue folic acid 1 mg daily,  - Continue hydroxyurea 1500 daily  - Continue Jadenu 1080 daily  - C/w antinausea meds  - Continue with pain medication for all pain crisis.    - Refilled all daily medications today.  - Refilled pain medication today after checking LA .  - Referral to GI for new complaint of pain in the lower esophagus and acid reflux.  Patient has not followed up with GI.    -- Repeat labs on follow up including hemoglobin electrophoresis- RTC 4 WEEKS.  Needs ophthalmology follow-up, highly encouraged today.  Needs to contact Medicaid regarding PCP to manage wellness visits and vaccinations.    The patient is in agreement with today's plan of care.  All questions answered.  Patient is aware to contact our office for any problems or concerns prior to next visit.      Vivian Hankins, MSN-ED, APRN, AGACNP-BC, OCN

## 2023-12-08 RX ORDER — OXYCODONE AND ACETAMINOPHEN 10; 325 MG/1; MG/1
1 TABLET ORAL EVERY 6 HOURS PRN
Qty: 120 TABLET | Refills: 0 | Status: SHIPPED | OUTPATIENT
Start: 2023-12-08 | End: 2024-01-04 | Stop reason: SDUPTHER

## 2024-01-04 ENCOUNTER — OFFICE VISIT (OUTPATIENT)
Dept: HEMATOLOGY/ONCOLOGY | Facility: CLINIC | Age: 34
End: 2024-01-04
Payer: MEDICAID

## 2024-01-04 VITALS
SYSTOLIC BLOOD PRESSURE: 112 MMHG | TEMPERATURE: 98 F | DIASTOLIC BLOOD PRESSURE: 72 MMHG | HEART RATE: 101 BPM | OXYGEN SATURATION: 100 % | WEIGHT: 170 LBS | HEIGHT: 67 IN | BODY MASS INDEX: 26.68 KG/M2

## 2024-01-04 DIAGNOSIS — D57.00 HB-SS DISEASE WITH CRISIS: ICD-10-CM

## 2024-01-04 DIAGNOSIS — E83.111 IRON OVERLOAD DUE TO REPEATED RED BLOOD CELL TRANSFUSIONS: ICD-10-CM

## 2024-01-04 DIAGNOSIS — D57.00 SICKLE CELL PAIN CRISIS: ICD-10-CM

## 2024-01-04 DIAGNOSIS — K21.9 GASTROESOPHAGEAL REFLUX DISEASE, UNSPECIFIED WHETHER ESOPHAGITIS PRESENT: ICD-10-CM

## 2024-01-04 DIAGNOSIS — D57.1 SICKLING DISORDER DUE TO HEMOGLOBIN S WITHOUT CRISIS: ICD-10-CM

## 2024-01-04 DIAGNOSIS — D69.6 THROMBOCYTOPENIA: Primary | ICD-10-CM

## 2024-01-04 PROCEDURE — 99213 OFFICE O/P EST LOW 20 MIN: CPT | Mod: ,,, | Performed by: NURSE PRACTITIONER

## 2024-01-04 PROCEDURE — 3074F SYST BP LT 130 MM HG: CPT | Mod: CPTII,,, | Performed by: NURSE PRACTITIONER

## 2024-01-04 PROCEDURE — 3008F BODY MASS INDEX DOCD: CPT | Mod: CPTII,,, | Performed by: NURSE PRACTITIONER

## 2024-01-04 PROCEDURE — 3078F DIAST BP <80 MM HG: CPT | Mod: CPTII,,, | Performed by: NURSE PRACTITIONER

## 2024-01-04 PROCEDURE — 1159F MED LIST DOCD IN RCRD: CPT | Mod: CPTII,,, | Performed by: NURSE PRACTITIONER

## 2024-01-04 PROCEDURE — 1160F RVW MEDS BY RX/DR IN RCRD: CPT | Mod: CPTII,,, | Performed by: NURSE PRACTITIONER

## 2024-01-04 RX ORDER — PANTOPRAZOLE SODIUM 40 MG/1
40 TABLET, DELAYED RELEASE ORAL DAILY
Qty: 120 TABLET | Refills: 1 | Status: SHIPPED | OUTPATIENT
Start: 2024-01-04

## 2024-01-04 RX ORDER — ACYCLOVIR 400 MG/1
400 TABLET ORAL 2 TIMES DAILY
Qty: 10 TABLET | Refills: 0 | Status: SHIPPED | OUTPATIENT
Start: 2024-01-04 | End: 2024-01-09

## 2024-01-04 RX ORDER — FOLIC ACID 1 MG/1
1 TABLET ORAL DAILY
Qty: 90 TABLET | Refills: 3 | Status: SHIPPED | OUTPATIENT
Start: 2024-01-04 | End: 2025-01-03

## 2024-01-04 RX ORDER — FENTANYL 100 UG/H
PATCH TRANSDERMAL
Qty: 10 PATCH | Refills: 0 | Status: SHIPPED | OUTPATIENT
Start: 2024-01-04 | End: 2024-02-01 | Stop reason: SDUPTHER

## 2024-01-04 RX ORDER — PROMETHAZINE HYDROCHLORIDE 25 MG/1
12.5 TABLET ORAL EVERY 6 HOURS PRN
Qty: 30 TABLET | Refills: 1 | Status: SHIPPED | OUTPATIENT
Start: 2024-01-04

## 2024-01-04 RX ORDER — ONDANSETRON 4 MG/1
8 TABLET, ORALLY DISINTEGRATING ORAL 2 TIMES DAILY
Qty: 30 TABLET | Refills: 3 | Status: SHIPPED | OUTPATIENT
Start: 2024-01-04

## 2024-01-04 RX ORDER — OXYCODONE AND ACETAMINOPHEN 10; 325 MG/1; MG/1
1 TABLET ORAL EVERY 6 HOURS PRN
Qty: 120 TABLET | Refills: 0 | Status: SHIPPED | OUTPATIENT
Start: 2024-01-04 | End: 2024-02-01 | Stop reason: SDUPTHER

## 2024-01-04 NOTE — PROGRESS NOTES
"  Diagnosis:  - SCD    Treatment History:  - Folic acid, Hydrea, Jadenu    Current Treatment:   - hydrea 1500 mg daily  - Jadenu 1080 mg daily  - Folic acid 1 mg daily        Subjective:       HPI  Anya Matt Leal  33 y.o.  female with past medical history significant for SCD here for follow-up  According to the patient she had been on folic acid, hydroxyurea and jadenu in the past.  Patient had not been taking her HU and Jadenu recently.  Patient had multiple transfusions over the year, requiring almost 10 transfusion last year.  Denies any history of exchange transfusion or acute chest syndrome.  Denies any history of avascular necrosis.  Denies any history of stroke. No ACS ever in her life never intubated Never full exchange transfusions >20 lifetime transfusions, in 2019 pt reports ~10 units of PRBC overall  No AVN  Never told she has iron overload and has never been on iron chelation prior to initial presentation  No strokes  Not on any pediatric transfusion regimen  >10 pain crisis per year (occur almost with every cycle requiring frequent hospital visits)  hospitalization 2/2021, had partial exchange of 1 unit  October 2023 reports pain crisis the last week of October, which involved a hospital admission for 2 days along with 2 units of PRBCs, IV pain medication, and IVFs.    Interval History:   1/4/2023:  Ms. 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  She reports going to the ER yesterday for IV fluids and pain medication due to dehydration.  She states, she has been sick over the holidays and went to the ER.  She has tested negative for Flu, Covid-19, RSV, and Strep.  She is requesting all sickle cell medications and pain medications be refilled today.  She reports chest discomfort today.  She rates her pain an "4-5" on 0-10 pain scale.  She denies any fever today, but had low-grade temperature last night, no chills, headaches, confirms fatigue, no " dizziness, confirms non-productive cough, SOB, abnormal bleeding, rash, nausea, weight loss, abdominal pain, flank pain, change in bowel or bladder habits, no lower extremity pain or swelling, no leg ulcers, and no neuropathy.  Weight is stable.  She reports compliance with Folic acid, Hydrea, and Jadence.   Labs reviewed with patient dated 1/2/2024:  Creatinine 0.93, Total Bili 1.6, , ferritin 1489, folic acid 5.02, WBC 15.48, Hgb 7.0, Hct 21.4, Platelets 926,000, retic 10.94.  We discussed low folic acid, which she states, she ran out.  Refilled today.  Eating well.    Past Medical History:   Diagnosis Date    Encounter for blood transfusion     Flu 04/2019    sore throat    Sickle cell anemia       Past Surgical History:   Procedure Laterality Date    MEDIPORT INSERTION, SINGLE      TUBAL LIGATION       Social History     Socioeconomic History    Marital status:    Tobacco Use    Smoking status: Never    Smokeless tobacco: Never   Substance and Sexual Activity    Alcohol use: No    Drug use: No    Sexual activity: Yes     Partners: Male      Family History   Problem Relation Age of Onset    Hypertension Mother       Review of patient's allergies indicates:   Allergen Reactions    Aspirin     Butorphanol     Compazine [prochlorperazine edisylate]     Ketorolac      Other reaction(s): other    Morpholine analogues     Nubain [nalbuphine]     Stadol [butorphanol tartrate]     Tramadol Rash     hives      CONSTITUTIONAL: no fevers, no chills, no weight loss, + fatigue, no weakness  HEMATOLOGIC: no abnormal bleeding, no abnormal bruising, no drenching night sweats  ONCOLOGIC: no new masses or lumps  HEENT: no vision loss, no tinnitus or hearing loss, no nose bleeding, no dysphagia, no odynophagia  CVS: no chest pain, no palpitations, no dyspnea on exertion  RESP: no shortness of breath, no hemoptysis, no cough  GI: + nausea, no vomiting, no diarrhea, no constipation, no melena, no hematochezia, no  hematemesis, no abdominal pain, no increase in abdominal girth  : no dysuria, no hematuria, no hesitancy, no scrotal swelling, no discharge  INTEGUMENT: no rashes, no abnormal bruising, no nail pitting, no hyperpigmentation  NEURO: no falls, no memory loss, no paresthesias or dysesthesias, no urofecal incontinence or retention, no loss of strength on any extremity  MSK: + muscle, bone, and joint pain all extremities and back.    PSYCH: no suicidal or homicidal ideation, no depression, no insomnia, no anhedonia  ENDOCRINE: no heat or cold intolerance, no polyuria, no polydipsia    Objective:        Vitals:    01/04/24 1115   BP: 112/72   Pulse: 101   Temp: 98.3 °F (36.8 °C)       ECOG PS 0  GA: AAOx3, NAD  HEENT:  Scleral icterus EOMI, good dentition, moist oral mucous membranes  LYMPH: no cervical, axillary or supraclavicular adenopathy  CVS: s1s2 RRR, no M/R/G, port in place without surrounding edema or erythema  RESP: CTA b/l, no crackles, no wheezes or rhonchi  ABD: soft, NT, ND, BS+, no hepatosplenomegaly  EXT: no deformities, no pedal edema  SKIN: no rashes, warm and dry  NEURO: normal mentation, strength 5/5 on all 4 extremities, no sensory deficits      LABS / IMAGING      Other labs reviewed, Hg ranged 7-10, normal Cr  9/5/17 Hg A 9.3 Hg A2 3.8 Hg F 3.6 Hg S 83.3  6/3/2020 hgb 9.5, WBC 10.7, , MCV 96.7, RDW 18.4 Hg A 30.6 Hg F 5.2% Hg S 60.7%  8/10/20 Hg F 7.8% Hg A 10.8% Hg S 77.4% ferritin 2997 Cr 0.64 Hg 9 .3  ANC 7.9  10/5/20 Cr 0.63, Tbili 2.4, , Ferritin 2556, WBC 8.23, Hgb 8.9, .5, , retic 8.41, ANC 4.78, Urine Protein 11.56, Hgb A 0% A21 3.8% F 8.9% S 83.5%  11/23/20 Cr 0.69, TP 8.5, ALB 4.6, alk phos 77, T bili 2.2, , iron 151, TIBC 194, iron sat 78%, ferritin 2746, WBC 8.47, Hgb 9.2, , retic 26K, ANC 5.69, urine protein 40.21, Hgb F 10.2, Hgb S 83.8  1/27/21 Hg A 0% A2 3.8 F 11.3% S 82.5% Other 2.4% Cr 0.61 Alb 4.3 TP 7.8 Tbili 2.1 AST 19  ALT 10  Iron 128 Ferritin 2218 WBC 9.87 Hg 9.1 .2  ANC 6.6 Abs retic 125k urine protein 12.17 mg/dl  3/31/21 Cr 0.69, TP 8.1, T bili 2.2, , Ferritin 2166, Folic acid 19.68, WBC 8.23, Hgb 8.0, .3, , ANC 3.90, abs retic ~175K, urine protein neg, urine culture neg, Hgb A 0%, Hgb A2 4.1%, Hgb F 11.7%, Hgb S 80.5% other 3.7%  8/23/21 WBC 9.59, Hgb 7.9, .4, , Cr 0.74, Tbili 1.5, , Folic acid 10.48, Ferritin 1460, UA protein neg, Hgb A2 3.5% F 10.8% S 82.6%  10/15/21 Hg A2 3.2% Hg F 9.2% Hg S 84.5% Cr 0.67 Alb 4.5 Tbili 1.8  Ferritin 1850 Folate 11.45 WBC 10.47 Hg 8.5  Retic 192k UA protein neg  12/8/21 Hg A 18.1% A2 3.1% F 7.7% S 71.1%, , Ferritin 1654, Folic acid 13.44, WBC 10.39, Hgb 8.1, PLT 42, Retic 6.13%, Urine protein neg  2/3/22 Cr 0.75 Alb 4.7 TP 8.2 Tbili 2.9  Ferritin 1729 Folate 15.9 WBC 11.75 Hg 7.4 RBC 2.25 MCV 97.8 RDW 17.9  Retic 5.85% Urine protein neg Hg A 10.6% Hg F 8.3% Hg S 78.1% Hg A2 3%  4/25/22 Cr 0.77, Alb 4.5, TP 7.8, Tbili 1.8, , Ferritin 1584, folic acid 15, WBC 9.90, Hgb 8.2, , ANC 3.77, urine protein 10.47 mg/dL, hgb electrophoresis pending**  - 10/21/2022:  WBC 15.63, hemoglobin 7.4, hematocrit 21.1, platelet count 454K  - 12/01/2022: WBC 13.36, hemoglobin 7.5, hematocrit 22.6, platelet count 4 4 6.  MCV 95.8    -04/18/23: Chloride 109, cr 0.71, alb 4.5, ca 9.5, TB 2.4, , LFTs WNL, Ferritin 1457, uric acid 7.2, wbc 11.87, hgb 8.0, plt 508, ANC 4.51, retic 9.39,   -5/19/23 CMP WNL except bili 2.9, , ferritin 1467, folate 16, UA 8.1 vit D 4, , WBC 15.87, HGB 6.9, HCT 20,  , retic 9.46,   06/20/2023:  WBC count 10.3, hemoglobin 8.5, MCV 97.6, platelet count 5 110, ANC 5.9, creatinine 0.72, albumin 4.6, calcium 9.2, LFTs within normal limits, bilirubin 1.7, ferritin 1261, hemoglobin F was ordered but not done.  7/29/2023:  CMP:  abnormal only:  Cl 110, bilirubin  1.9.  , Ferritin 1449, Uric acid 7.5, Vit D 6 ng/mL,  CBC:  abnormal only:  WBC 13.87, Hgb 7.7, Hct 22.1, MCV 98.7, RDW-CV 16.6, platelets 387,000, retic 8.54, Lymph 6.44, mono 1.18.   8/30/2023:  CMP:  Cl- 109, BUN 6.97, creatinine 0.78,  Total Bili 2.1, , Ferritin 1575.  CBC:  WBC 11.66, Hgb 7.9, Hct 23.5, platelets 428,000, Retic 8.23.  8/23/2023: Hgb F 6.1.  9/26/2023:  Cl- 111, Creatinine 0.84, Total Bili 2.4, , Ferritin 2215, WBC 16.06, Hgb 7.0, Hct 21.0, Plt 545,000, retic 4.18, Hgb F 5.4.  12/06/2023:  Creatinine 0.80, Total Bili 2.9, AST and ALT WNL, , ferritin 1637, folic acid 9.47, WBC 12.65, Hgb 7.6, Hct 22.4, Plt 413,000.  No protein electrophoresis.    1/2/2024:  Creatinine 0.93, Total Bili 1.6, , ferritin 1489, folic acid 5.02, WBC 15.48, Hgb 7.0, Hct 21.4, Platelets 926,000, retic 10.94.     Imaging:  reviewed, X-Rays of b/l hips 11/2019 no AVN, CXR unremarkable    Assessment:   1. Sickle cell disease D57.1 C/w hydrea 1500mg daily, Hg F not at goal on most recent lab work, Hgb electrophoresis obtained today still pending  C/w folic acid 1mg daily  Adequate PO hydration  Analgesia with fentanyl and percocet 10mg as needed. C/w fentanyl 100mcg/hr,   Avoid overtransfusion. Only transfuse if symptomatic anemia. Do not transfuse for simple pain crisis  Of note, she did receive 3 days of Desferal and was transfused 1 unit PRBC during hospitalization at [facility] 11/2021.  Desferal x 10 days planned for 3/2022 but patient decided against Desferal, will continue to monitor ferritin closely and will Obtain hg electrophoresis, ferritin, iron, tibc, CBC w/ diff, CMP, folate, retic, UA for urine protein 1 week prior to return visit  Unknown prior vaccinations. She believes she last got PNA vaccinations ~5 years ago.  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  Platelet count is elevated at 926,000.  Denies any abnormal bleeding or stroke like symptoms.  She is requesting pain medication refills, along with all daily medications to be refilled at this visit.  Reports compliance with Folic acid, Hydrea, and Jadence.    Plan:   1/4/2024:    - Reinforced medication compliance, good oral hydration, diet, and rest for current pain crisis.  - Continue folic acid 1 mg daily,  - Continue hydroxyurea 1500 daily  - Continue Jadenu 1080 daily  - C/w antinausea meds  - Continue with pain medication for all pain crisis.    - Refilled all daily medications today.  - Refilled pain medication today after checking LA .  - Referral to GI for new complaint of pain in the lower esophagus and acid reflux.  Patient has not followed up with GI.    -- Repeat labs on follow up including hemoglobin electrophoresis- RTC 4 WEEKS.  Needs ophthalmology follow-up, highly encouraged today.  Needs to contact Medicaid regarding PCP to manage wellness visits and vaccinations.    The patient is in agreement with today's plan of care.  All questions answered.  Patient is aware to contact our office for any problems or concerns prior to next visit.      Vivian Hankins, MSN-ED, APRN, AGACNP-BC, OCN

## 2024-02-01 ENCOUNTER — OFFICE VISIT (OUTPATIENT)
Dept: HEMATOLOGY/ONCOLOGY | Facility: CLINIC | Age: 34
End: 2024-02-01
Payer: MEDICAID

## 2024-02-01 VITALS
TEMPERATURE: 98 F | RESPIRATION RATE: 18 BRPM | HEIGHT: 67 IN | OXYGEN SATURATION: 98 % | WEIGHT: 166 LBS | HEART RATE: 98 BPM | SYSTOLIC BLOOD PRESSURE: 117 MMHG | BODY MASS INDEX: 26.06 KG/M2 | DIASTOLIC BLOOD PRESSURE: 87 MMHG

## 2024-02-01 DIAGNOSIS — D57.00 HB-SS DISEASE WITH CRISIS: ICD-10-CM

## 2024-02-01 DIAGNOSIS — R63.0 DECREASED APPETITE: Primary | ICD-10-CM

## 2024-02-01 DIAGNOSIS — G89.29 ENCOUNTER FOR CHRONIC PAIN MANAGEMENT: ICD-10-CM

## 2024-02-01 DIAGNOSIS — D57.1 SICKLING DISORDER DUE TO HEMOGLOBIN S WITHOUT CRISIS: ICD-10-CM

## 2024-02-01 DIAGNOSIS — D57.00 SICKLE CELL PAIN CRISIS: ICD-10-CM

## 2024-02-01 DIAGNOSIS — E83.111 IRON OVERLOAD DUE TO REPEATED RED BLOOD CELL TRANSFUSIONS: ICD-10-CM

## 2024-02-01 PROCEDURE — 99214 OFFICE O/P EST MOD 30 MIN: CPT | Mod: ,,, | Performed by: NURSE PRACTITIONER

## 2024-02-01 PROCEDURE — 3074F SYST BP LT 130 MM HG: CPT | Mod: CPTII,,, | Performed by: NURSE PRACTITIONER

## 2024-02-01 PROCEDURE — 3079F DIAST BP 80-89 MM HG: CPT | Mod: CPTII,,, | Performed by: NURSE PRACTITIONER

## 2024-02-01 PROCEDURE — 3008F BODY MASS INDEX DOCD: CPT | Mod: CPTII,,, | Performed by: NURSE PRACTITIONER

## 2024-02-01 PROCEDURE — 1160F RVW MEDS BY RX/DR IN RCRD: CPT | Mod: CPTII,,, | Performed by: NURSE PRACTITIONER

## 2024-02-01 PROCEDURE — 1159F MED LIST DOCD IN RCRD: CPT | Mod: CPTII,,, | Performed by: NURSE PRACTITIONER

## 2024-02-01 RX ORDER — FENTANYL 100 UG/H
PATCH TRANSDERMAL
Qty: 10 PATCH | Refills: 0 | Status: SHIPPED | OUTPATIENT
Start: 2024-02-01 | End: 2024-03-04 | Stop reason: SDUPTHER

## 2024-02-01 RX ORDER — OXYCODONE AND ACETAMINOPHEN 10; 325 MG/1; MG/1
1 TABLET ORAL EVERY 6 HOURS PRN
Qty: 120 TABLET | Refills: 0 | Status: SHIPPED | OUTPATIENT
Start: 2024-02-01 | End: 2024-03-04 | Stop reason: SDUPTHER

## 2024-02-01 RX ORDER — CYPROHEPTADINE HYDROCHLORIDE 4 MG/1
4 TABLET ORAL 3 TIMES DAILY PRN
Qty: 90 TABLET | Refills: 1 | Status: SHIPPED | OUTPATIENT
Start: 2024-02-01

## 2024-02-01 NOTE — PROGRESS NOTES
"  Diagnosis:  - SCD    Treatment History:  - Folic acid, Hydrea, Jadenu    Current Treatment:   - hydrea 1500 mg daily  - Jadenu 1080 mg daily  - Folic acid 1 mg daily        Subjective:       HPI  Anya Matt Leal  33 y.o.  female with past medical history significant for SCD here for follow-up  According to the patient she had been on folic acid, hydroxyurea and jadenu in the past.  Patient had not been taking her HU and Jadenu recently.  Patient had multiple transfusions over the year, requiring almost 10 transfusion last year.  Denies any history of exchange transfusion or acute chest syndrome.  Denies any history of avascular necrosis.  Denies any history of stroke. No ACS ever in her life never intubated Never full exchange transfusions >20 lifetime transfusions, in 2019 pt reports ~10 units of PRBC overall  No AVN  Never told she has iron overload and has never been on iron chelation prior to initial presentation  No strokes  Not on any pediatric transfusion regimen  >10 pain crisis per year (occur almost with every cycle requiring frequent hospital visits)  hospitalization 2/2021, had partial exchange of 1 unit  October 2023 reports pain crisis the last week of October, which involved a hospital admission for 2 days along with 2 units of PRBCs, IV pain medication, and IVFs.    Interval History:   2/1/2024:  Ms. 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  She reports recent hospitalization for cholecystectomy with 3 units of PRBCs, IVFs, and pain mediation during her stay.  She is requesting pain medications be refilled today.  She reports menstrual discomfort today.  She rates her pain an "4-5" on 0-10 pain scale.  She denies any fever, chills, headaches, confirms fatigue, no dizziness, confirms non-productive cough, SOB, abnormal bleeding, rash, nausea, weight loss, abdominal pain, flank pain, change in bowel or bladder habits, no lower extremity pain or " swelling, no leg ulcers, and no neuropathy.  Lost 6 pounds during hospitalization.  She reports compliance with Folic acid, Hydrea, and Jadence.   Labs reviewed with patient dated 1/31/2024: Creatinine 0.83, Total Bili 2.0, , ferritin 2751, folic acid 8.23, WBC 12.39, Hgb 8.3, Hct 25.2, Platelets 512,000, Hgb electrophoresis Hgb F 5.7.  Appetite is decreased.     Past Medical History:   Diagnosis Date    Calculus of gallbladder without cholecystitis without obstruction     Encounter for blood transfusion     Flu 04/2019    sore throat    Sickle cell anemia       Past Surgical History:   Procedure Laterality Date    GALLBLADDER SURGERY  01/05/2024    MEDIPORT INSERTION, SINGLE      TUBAL LIGATION       Social History     Socioeconomic History    Marital status:    Tobacco Use    Smoking status: Never    Smokeless tobacco: Never   Substance and Sexual Activity    Alcohol use: No    Drug use: No    Sexual activity: Yes     Partners: Male      Family History   Problem Relation Age of Onset    Hypertension Mother       Review of patient's allergies indicates:   Allergen Reactions    Aspirin     Butorphanol     Compazine [prochlorperazine edisylate]     Ketorolac      Other reaction(s): other    Morpholine analogues     Nubain [nalbuphine]     Stadol [butorphanol tartrate]     Tramadol Rash     hives      CONSTITUTIONAL: no fevers, no chills, no weight loss, + fatigue, no weakness  HEMATOLOGIC: no abnormal bleeding, no abnormal bruising, no drenching night sweats  ONCOLOGIC: no new masses or lumps  HEENT: no vision loss, no tinnitus or hearing loss, no nose bleeding, no dysphagia, no odynophagia  CVS: no chest pain, no palpitations, no dyspnea on exertion  RESP: no shortness of breath, no hemoptysis, no cough  GI: + nausea, no vomiting, no diarrhea, no constipation, no melena, no hematochezia, no hematemesis, no abdominal pain, no increase in abdominal girth  : no dysuria, no hematuria, no hesitancy, no  scrotal swelling, no discharge  INTEGUMENT: no rashes, no abnormal bruising, no nail pitting, no hyperpigmentation  NEURO: no falls, no memory loss, no paresthesias or dysesthesias, no urofecal incontinence or retention, no loss of strength on any extremity  MSK: + muscle, bone, and joint pain all extremities and back.    PSYCH: no suicidal or homicidal ideation, no depression, no insomnia, no anhedonia  ENDOCRINE: no heat or cold intolerance, no polyuria, no polydipsia    Objective:        Vitals:    02/01/24 1112   BP: 117/87   Pulse: 98   Resp: 18   Temp: 98 °F (36.7 °C)         ECOG PS 0  GA: AAOx3, NAD  HEENT:  Scleral icterus EOMI, good dentition, moist oral mucous membranes  LYMPH: no cervical, axillary or supraclavicular adenopathy  CVS: s1s2 RRR, no M/R/G, port in place without surrounding edema or erythema  RESP: CTA b/l, no crackles, no wheezes or rhonchi  ABD: soft, NT, ND, BS+, no hepatosplenomegaly  EXT: no deformities, no pedal edema  SKIN: no rashes, warm and dry  NEURO: normal mentation, strength 5/5 on all 4 extremities, no sensory deficits      LABS / IMAGING    1/31/2024: Creatinine 0.83, Total Bili 2.0, , ferritin 2751, folic acid 8.23, WBC 12.39, Hgb 8.3, Hct 25.2, Platelets 512,000, Hgb electrophoresis Hgb F 5.7.   Other labs reviewed, Hg ranged 7-10, normal Cr  9/5/17 Hg A 9.3 Hg A2 3.8 Hg F 3.6 Hg S 83.3  6/3/2020 hgb 9.5, WBC 10.7, , MCV 96.7, RDW 18.4 Hg A 30.6 Hg F 5.2% Hg S 60.7%  8/10/20 Hg F 7.8% Hg A 10.8% Hg S 77.4% ferritin 2997 Cr 0.64 Hg 9 .3  ANC 7.9  10/5/20 Cr 0.63, Tbili 2.4, , Ferritin 2556, WBC 8.23, Hgb 8.9, .5, , retic 8.41, ANC 4.78, Urine Protein 11.56, Hgb A 0% A21 3.8% F 8.9% S 83.5%  11/23/20 Cr 0.69, TP 8.5, ALB 4.6, alk phos 77, T bili 2.2, , iron 151, TIBC 194, iron sat 78%, ferritin 2746, WBC 8.47, Hgb 9.2, , retic 26K, ANC 5.69, urine protein 40.21, Hgb F 10.2, Hgb S 83.8  1/27/21 Hg A 0% A2 3.8 F  11.3% S 82.5% Other 2.4% Cr 0.61 Alb 4.3 TP 7.8 Tbili 2.1 AST 19 ALT 10  Iron 128 Ferritin 2218 WBC 9.87 Hg 9.1 .2  ANC 6.6 Abs retic 125k urine protein 12.17 mg/dl  3/31/21 Cr 0.69, TP 8.1, T bili 2.2, , Ferritin 2166, Folic acid 19.68, WBC 8.23, Hgb 8.0, .3, , ANC 3.90, abs retic ~175K, urine protein neg, urine culture neg, Hgb A 0%, Hgb A2 4.1%, Hgb F 11.7%, Hgb S 80.5% other 3.7%  8/23/21 WBC 9.59, Hgb 7.9, .4, , Cr 0.74, Tbili 1.5, , Folic acid 10.48, Ferritin 1460, UA protein neg, Hgb A2 3.5% F 10.8% S 82.6%  10/15/21 Hg A2 3.2% Hg F 9.2% Hg S 84.5% Cr 0.67 Alb 4.5 Tbili 1.8  Ferritin 1850 Folate 11.45 WBC 10.47 Hg 8.5  Retic 192k UA protein neg  12/8/21 Hg A 18.1% A2 3.1% F 7.7% S 71.1%, , Ferritin 1654, Folic acid 13.44, WBC 10.39, Hgb 8.1, PLT 42, Retic 6.13%, Urine protein neg  2/3/22 Cr 0.75 Alb 4.7 TP 8.2 Tbili 2.9  Ferritin 1729 Folate 15.9 WBC 11.75 Hg 7.4 RBC 2.25 MCV 97.8 RDW 17.9  Retic 5.85% Urine protein neg Hg A 10.6% Hg F 8.3% Hg S 78.1% Hg A2 3%  4/25/22 Cr 0.77, Alb 4.5, TP 7.8, Tbili 1.8, , Ferritin 1584, folic acid 15, WBC 9.90, Hgb 8.2, , ANC 3.77, urine protein 10.47 mg/dL, hgb electrophoresis pending**  - 10/21/2022:  WBC 15.63, hemoglobin 7.4, hematocrit 21.1, platelet count 454K  - 12/01/2022: WBC 13.36, hemoglobin 7.5, hematocrit 22.6, platelet count 4 4 6.  MCV 95.8    -04/18/23: Chloride 109, cr 0.71, alb 4.5, ca 9.5, TB 2.4, , LFTs WNL, Ferritin 1457, uric acid 7.2, wbc 11.87, hgb 8.0, plt 508, ANC 4.51, retic 9.39,   -5/19/23 CMP WNL except bili 2.9, , ferritin 1467, folate 16, UA 8.1 vit D 4, , WBC 15.87, HGB 6.9, HCT 20,  , retic 9.46,   06/20/2023:  WBC count 10.3, hemoglobin 8.5, MCV 97.6, platelet count 5 110, ANC 5.9, creatinine 0.72, albumin 4.6, calcium 9.2, LFTs within normal limits, bilirubin 1.7, ferritin 1261, hemoglobin F was ordered but  not done.  7/29/2023:  CMP:  abnormal only:  Cl 110, bilirubin 1.9.  , Ferritin 1449, Uric acid 7.5, Vit D 6 ng/mL,  CBC:  abnormal only:  WBC 13.87, Hgb 7.7, Hct 22.1, MCV 98.7, RDW-CV 16.6, platelets 387,000, retic 8.54, Lymph 6.44, mono 1.18.   8/30/2023:  CMP:  Cl- 109, BUN 6.97, creatinine 0.78,  Total Bili 2.1, , Ferritin 1575.  CBC:  WBC 11.66, Hgb 7.9, Hct 23.5, platelets 428,000, Retic 8.23.  8/23/2023: Hgb F 6.1.  9/26/2023:  Cl- 111, Creatinine 0.84, Total Bili 2.4, , Ferritin 2215, WBC 16.06, Hgb 7.0, Hct 21.0, Plt 545,000, retic 4.18, Hgb F 5.4.  12/06/2023:  Creatinine 0.80, Total Bili 2.9, AST and ALT WNL, , ferritin 1637, folic acid 9.47, WBC 12.65, Hgb 7.6, Hct 22.4, Plt 413,000.  No protein electrophoresis.    1/2/2024:  Creatinine 0.93, Total Bili 1.6, , ferritin 1489, folic acid 5.02, WBC 15.48, Hgb 7.0, Hct 21.4, Platelets 926,000, retic 10.94.     Imaging:  reviewed, X-Rays of b/l hips 11/2019 no AVN, CXR unremarkable    Assessment:   1. Sickle cell disease D57.1 C/w hydrea 1500mg daily, Hg F not at goal on most recent lab work, Hgb electrophoresis obtained today still pending  C/w folic acid 1mg daily  Adequate PO hydration  Analgesia with fentanyl and percocet 10mg as needed. C/w fentanyl 100mcg/hr,   Avoid overtransfusion. Only transfuse if symptomatic anemia. Do not transfuse for simple pain crisis  Of note, she did receive 3 days of Desferal and was transfused 1 unit PRBC during hospitalization at [facility] 11/2021.  Desferal x 10 days planned for 3/2022 but patient decided against Desferal, will continue to monitor ferritin closely and will Obtain hg electrophoresis, ferritin, iron, tibc, CBC w/ diff, CMP, folate, retic, UA for urine protein 1 week prior to return visit  Unknown prior vaccinations. She believes she last got PNA vaccinations ~5 years ago.  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  Requesting pain   medication refills.  Reports compliance with Folic acid, Hydrea, and Jadence.    Plan:   2/1/2024:    - Reinforced medication compliance, good oral hydration, diet, and rest for current pain crisis.  - Continue folic acid 1 mg daily,  - Continue hydroxyurea 1500 daily  - Continue Jadenu 1080 daily  - C/w antinausea meds  - Continue with pain medication for all pain crisis.    - Refilled pain medication today after checking LA .  -- Repeat labs on follow up including hemoglobin electrophoresis- RTC 4 WEEKS.  Needs ophthalmology follow-up, highly encouraged today.  Needs to contact Medicaid regarding PCP to manage wellness visits and vaccinations.    The patient is in agreement with today's plan of care.  All questions answered.  Patient is aware to contact our office for any problems or concerns prior to next visit.      Vivian Hankins, MSN-ED, APRN, AGACNP-BC, OCN

## 2024-03-04 DIAGNOSIS — D57.00 HB-SS DISEASE WITH CRISIS: ICD-10-CM

## 2024-03-04 DIAGNOSIS — D57.1 SICKLING DISORDER DUE TO HEMOGLOBIN S WITHOUT CRISIS: ICD-10-CM

## 2024-03-04 DIAGNOSIS — D57.00 SICKLE CELL PAIN CRISIS: ICD-10-CM

## 2024-03-04 RX ORDER — OXYCODONE AND ACETAMINOPHEN 10; 325 MG/1; MG/1
1 TABLET ORAL EVERY 6 HOURS PRN
Qty: 120 TABLET | Refills: 0 | Status: SHIPPED | OUTPATIENT
Start: 2024-03-04 | End: 2024-04-04 | Stop reason: SDUPTHER

## 2024-03-04 RX ORDER — FENTANYL 100 UG/H
PATCH TRANSDERMAL
Qty: 10 PATCH | Refills: 0 | Status: SHIPPED | OUTPATIENT
Start: 2024-03-04 | End: 2024-04-04 | Stop reason: SDUPTHER

## 2024-03-04 RX ORDER — PROMETHAZINE HYDROCHLORIDE 25 MG/1
12.5 TABLET ORAL EVERY 6 HOURS PRN
Qty: 30 TABLET | Refills: 1 | Status: SHIPPED | OUTPATIENT
Start: 2024-03-04 | End: 2024-04-04 | Stop reason: SDUPTHER

## 2024-03-04 NOTE — PROGRESS NOTES
"  Diagnosis:  - SCD    Treatment History:  - Folic acid, Hydrea, Jadenu    Current Treatment:   - hydrea 1500 mg daily  - Jadenu 1080 mg daily  - Folic acid 1 mg daily        Subjective:       LAUREN Joyner Matt Leal  33 y.o.  female with past medical history significant for SCD here for follow-up  According to the patient she had been on folic acid, hydroxyurea and jadenu in the past.  Patient had not been taking her HU and Jadenu recently.  Patient had multiple transfusions over the year, requiring almost 10 transfusion last year.  Denies any history of exchange transfusion or acute chest syndrome.  Denies any history of avascular necrosis.  Denies any history of stroke. No ACS ever in her life never intubated Never full exchange transfusions >20 lifetime transfusions, in 2019 pt reports ~10 units of PRBC overall  No AVN  Never told she has iron overload and has never been on iron chelation prior to initial presentation  No strokes  Not on any pediatric transfusion regimen  >10 pain crisis per year (occur almost with every cycle requiring frequent hospital visits)  hospitalization 2/2021, had partial exchange of 1 unit  October 2023 reports pain crisis the last week of October, which involved a hospital admission for 2 days along with 2 units of PRBCs, IV pain medication, and IVFs.    Interval History:   3/5/2024:  Ms. 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  She reports ER visit on 3/4/2024 for a fall that resulted in a sprained right foot.  She is wearing a support boot.   Pain medication refilled on 3/4/2024.  She rates her chronic pain of of the chest arms, legs, and back an "8" on 0-10 pain scale.  She denies any fever, chills, headaches, fatigue, dizziness, cough, SOB, abnormal bleeding, rash, nausea, weight loss, abdominal pain, flank pain, change in bowel or bladder habits, no lower extremity pain or swelling, no leg ulcers, and no neuropathy.  Lost 6 pounds " during hospitalization.  She reports compliance with Folic acid, Hydrea, and Jadence.   Labs reviewed with patient dated 3/4/2024:  Creatinine 0.88, Total Bili 2.2, WBC 13.55, Hgb 8.0, Hct 24.1, Platelets 534,000.  Appetite is decreased.   Continues to take Periactin.  Weight is stable.    Past Medical History:   Diagnosis Date    Calculus of gallbladder without cholecystitis without obstruction     Encounter for blood transfusion     Flu 04/2019    sore throat    Sickle cell anemia       Past Surgical History:   Procedure Laterality Date    GALLBLADDER SURGERY  01/05/2024    MEDIPORT INSERTION, SINGLE      TUBAL LIGATION       Social History     Socioeconomic History    Marital status:    Tobacco Use    Smoking status: Never    Smokeless tobacco: Never   Substance and Sexual Activity    Alcohol use: No    Drug use: No    Sexual activity: Yes     Partners: Male      Family History   Problem Relation Age of Onset    Hypertension Mother       Review of patient's allergies indicates:   Allergen Reactions    Aspirin     Butorphanol     Compazine [prochlorperazine edisylate]     Ketorolac      Other reaction(s): other    Morpholine analogues     Nubain [nalbuphine]     Stadol [butorphanol tartrate]     Tramadol Rash     hives      CONSTITUTIONAL: no fevers, no chills, no weight loss, + fatigue, no weakness  HEMATOLOGIC: no abnormal bleeding, no abnormal bruising, no drenching night sweats  ONCOLOGIC: no new masses or lumps  HEENT: no vision loss, no tinnitus or hearing loss, no nose bleeding, no dysphagia, no odynophagia  CVS: no chest pain, no palpitations, no dyspnea on exertion  RESP: no shortness of breath, no hemoptysis, no cough  GI: + nausea, no vomiting, no diarrhea, no constipation, no melena, no hematochezia, no hematemesis, no abdominal pain, no increase in abdominal girth  : no dysuria, no hematuria, no hesitancy, no scrotal swelling, no discharge  INTEGUMENT: no rashes, no abnormal bruising, no nail  pitting, no hyperpigmentation  NEURO: no falls, no memory loss, no paresthesias or dysesthesias, no urofecal incontinence or retention, no loss of strength on any extremity  MSK: + muscle, bone, and joint pain all extremities and back.    PSYCH: no suicidal or homicidal ideation, no depression, no insomnia, no anhedonia  ENDOCRINE: no heat or cold intolerance, no polyuria, no polydipsia    Objective:        Vitals:    03/05/24 1108   BP: (!) 124/92   Pulse: 98   Resp: 20   Temp: 98.7 °F (37.1 °C)           ECOG PS 0  GA: AAOx3, NAD  HEENT:  Scleral icterus EOMI, good dentition, moist oral mucous membranes  LYMPH: no cervical, axillary or supraclavicular adenopathy  CVS: s1s2 RRR, no M/R/G, port in place without surrounding edema or erythema  RESP: CTA b/l, no crackles, no wheezes or rhonchi  ABD: soft, NT, ND, BS+, no hepatosplenomegaly  EXT: no deformities, no pedal edema  SKIN: no rashes, warm and dry  NEURO: normal mentation, strength 5/5 on all 4 extremities, no sensory deficits      LABS / IMAGING    3/4/2024:  Creatinine 0.88, Total Bili 2.2, WBC 13.55, Hgb 8.0, Hct 24.1, Platelets 534,000.  1/31/2024: Creatinine 0.83, Total Bili 2.0, , ferritin 2751, folic acid 8.23, WBC 12.39, Hgb 8.3, Hct 25.2, Platelets 512,000, Hgb electrophoresis Hgb F 5.7.   Other labs reviewed, Hg ranged 7-10, normal Cr  9/5/17 Hg A 9.3 Hg A2 3.8 Hg F 3.6 Hg S 83.3  6/3/2020 hgb 9.5, WBC 10.7, , MCV 96.7, RDW 18.4 Hg A 30.6 Hg F 5.2% Hg S 60.7%  8/10/20 Hg F 7.8% Hg A 10.8% Hg S 77.4% ferritin 2997 Cr 0.64 Hg 9 .3  ANC 7.9  10/5/20 Cr 0.63, Tbili 2.4, , Ferritin 2556, WBC 8.23, Hgb 8.9, .5, , retic 8.41, ANC 4.78, Urine Protein 11.56, Hgb A 0% A21 3.8% F 8.9% S 83.5%  11/23/20 Cr 0.69, TP 8.5, ALB 4.6, alk phos 77, T bili 2.2, , iron 151, TIBC 194, iron sat 78%, ferritin 2746, WBC 8.47, Hgb 9.2, , retic 26K, ANC 5.69, urine protein 40.21, Hgb F 10.2, Hgb S 83.8  1/27/21 Hg  A 0% A2 3.8 F 11.3% S 82.5% Other 2.4% Cr 0.61 Alb 4.3 TP 7.8 Tbili 2.1 AST 19 ALT 10  Iron 128 Ferritin 2218 WBC 9.87 Hg 9.1 .2  ANC 6.6 Abs retic 125k urine protein 12.17 mg/dl  3/31/21 Cr 0.69, TP 8.1, T bili 2.2, , Ferritin 2166, Folic acid 19.68, WBC 8.23, Hgb 8.0, .3, , ANC 3.90, abs retic ~175K, urine protein neg, urine culture neg, Hgb A 0%, Hgb A2 4.1%, Hgb F 11.7%, Hgb S 80.5% other 3.7%  8/23/21 WBC 9.59, Hgb 7.9, .4, , Cr 0.74, Tbili 1.5, , Folic acid 10.48, Ferritin 1460, UA protein neg, Hgb A2 3.5% F 10.8% S 82.6%  10/15/21 Hg A2 3.2% Hg F 9.2% Hg S 84.5% Cr 0.67 Alb 4.5 Tbili 1.8  Ferritin 1850 Folate 11.45 WBC 10.47 Hg 8.5  Retic 192k UA protein neg  12/8/21 Hg A 18.1% A2 3.1% F 7.7% S 71.1%, , Ferritin 1654, Folic acid 13.44, WBC 10.39, Hgb 8.1, PLT 42, Retic 6.13%, Urine protein neg  2/3/22 Cr 0.75 Alb 4.7 TP 8.2 Tbili 2.9  Ferritin 1729 Folate 15.9 WBC 11.75 Hg 7.4 RBC 2.25 MCV 97.8 RDW 17.9  Retic 5.85% Urine protein neg Hg A 10.6% Hg F 8.3% Hg S 78.1% Hg A2 3%  4/25/22 Cr 0.77, Alb 4.5, TP 7.8, Tbili 1.8, , Ferritin 1584, folic acid 15, WBC 9.90, Hgb 8.2, , ANC 3.77, urine protein 10.47 mg/dL, hgb electrophoresis pending**  - 10/21/2022:  WBC 15.63, hemoglobin 7.4, hematocrit 21.1, platelet count 454K  - 12/01/2022: WBC 13.36, hemoglobin 7.5, hematocrit 22.6, platelet count 4 4 6.  MCV 95.8    -04/18/23: Chloride 109, cr 0.71, alb 4.5, ca 9.5, TB 2.4, , LFTs WNL, Ferritin 1457, uric acid 7.2, wbc 11.87, hgb 8.0, plt 508, ANC 4.51, retic 9.39,   -5/19/23 CMP WNL except bili 2.9, , ferritin 1467, folate 16, UA 8.1 vit D 4, , WBC 15.87, HGB 6.9, HCT 20,  , retic 9.46,   06/20/2023:  WBC count 10.3, hemoglobin 8.5, MCV 97.6, platelet count 5 110, ANC 5.9, creatinine 0.72, albumin 4.6, calcium 9.2, LFTs within normal limits, bilirubin 1.7, ferritin 1261, hemoglobin F  was ordered but not done.  7/29/2023:  CMP:  abnormal only:  Cl 110, bilirubin 1.9.  , Ferritin 1449, Uric acid 7.5, Vit D 6 ng/mL,  CBC:  abnormal only:  WBC 13.87, Hgb 7.7, Hct 22.1, MCV 98.7, RDW-CV 16.6, platelets 387,000, retic 8.54, Lymph 6.44, mono 1.18.   8/30/2023:  CMP:  Cl- 109, BUN 6.97, creatinine 0.78,  Total Bili 2.1, , Ferritin 1575.  CBC:  WBC 11.66, Hgb 7.9, Hct 23.5, platelets 428,000, Retic 8.23.  8/23/2023: Hgb F 6.1.  9/26/2023:  Cl- 111, Creatinine 0.84, Total Bili 2.4, , Ferritin 2215, WBC 16.06, Hgb 7.0, Hct 21.0, Plt 545,000, retic 4.18, Hgb F 5.4.  12/06/2023:  Creatinine 0.80, Total Bili 2.9, AST and ALT WNL, , ferritin 1637, folic acid 9.47, WBC 12.65, Hgb 7.6, Hct 22.4, Plt 413,000.  No protein electrophoresis.    1/2/2024:  Creatinine 0.93, Total Bili 1.6, , ferritin 1489, folic acid 5.02, WBC 15.48, Hgb 7.0, Hct 21.4, Platelets 926,000, retic 10.94.     Imaging:  reviewed, X-Rays of b/l hips 11/2019 no AVN, CXR unremarkable    Assessment:   1. Sickle cell disease D57.1 C/w hydrea 1500mg daily, Hg F not at goal on most recent lab work, Hgb electrophoresis obtained today still pending  C/w folic acid 1mg daily  Adequate PO hydration  Analgesia with fentanyl and percocet 10mg as needed. C/w fentanyl 100mcg/hr,   Avoid overtransfusion. Only transfuse if symptomatic anemia. Do not transfuse for simple pain crisis  Of note, she did receive 3 days of Desferal and was transfused 1 unit PRBC during hospitalization at [facility] 11/2021.  Desferal x 10 days planned for 3/2022 but patient decided against Desferal, will continue to monitor ferritin closely and will Obtain hg electrophoresis, ferritin, iron, tibc, CBC w/ diff, CMP, folate, retic, UA for urine protein 1 week prior to return visit  Unknown prior vaccinations. She believes she last got PNA vaccinations ~5 years ago.  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      3/5/2024 Assessment:  In ER on 3/4/2024 for a fall that resulted in sprained right foot.  Refilled pain medications on 3/4/2024.  Reports compliance with Folic acid, Hydrea, and Jadence.  Labs stable.  Plan:   2/1/2024:    - Reinforced medication compliance, good oral hydration, diet, and rest for current pain crisis.  - Continue folic acid 1 mg daily,  - Continue hydroxyurea 1500 daily  - Continue Jadenu 1080 daily  - C/w antinausea meds  - Continue with pain medication for all pain crisis.    - Refilled pain medication 3/4/2024 after checking LA .  -- Repeat labs on follow up including hemoglobin electrophoresis- RTC 4 WEEKS.  Needs ophthalmology follow-up, highly encouraged today.  Needs to contact Medicaid regarding PCP to manage wellness visits and vaccinations.    The patient is in agreement with today's plan of care.  All questions answered.  Patient is aware to contact our office for any problems or concerns prior to next visit.      Vivian Hankins, MSN-ED, APRN, AGACNP-BC, OCN

## 2024-03-05 ENCOUNTER — OFFICE VISIT (OUTPATIENT)
Dept: HEMATOLOGY/ONCOLOGY | Facility: CLINIC | Age: 34
End: 2024-03-05
Payer: MEDICAID

## 2024-03-05 VITALS
BODY MASS INDEX: 26.21 KG/M2 | WEIGHT: 167 LBS | OXYGEN SATURATION: 99 % | HEIGHT: 67 IN | DIASTOLIC BLOOD PRESSURE: 92 MMHG | TEMPERATURE: 99 F | SYSTOLIC BLOOD PRESSURE: 124 MMHG | RESPIRATION RATE: 20 BRPM | HEART RATE: 98 BPM

## 2024-03-05 DIAGNOSIS — E83.111 IRON OVERLOAD DUE TO REPEATED RED BLOOD CELL TRANSFUSIONS: ICD-10-CM

## 2024-03-05 DIAGNOSIS — G89.29 ENCOUNTER FOR CHRONIC PAIN MANAGEMENT: Primary | ICD-10-CM

## 2024-03-05 DIAGNOSIS — D57.1 SICKLING DISORDER DUE TO HEMOGLOBIN S WITHOUT CRISIS: ICD-10-CM

## 2024-03-05 DIAGNOSIS — E83.119 HEMOCHROMATOSIS, UNSPECIFIED HEMOCHROMATOSIS TYPE: ICD-10-CM

## 2024-03-05 DIAGNOSIS — D69.6 THROMBOCYTOPENIA: ICD-10-CM

## 2024-03-05 PROCEDURE — 3080F DIAST BP >= 90 MM HG: CPT | Mod: CPTII,,, | Performed by: NURSE PRACTITIONER

## 2024-03-05 PROCEDURE — 3008F BODY MASS INDEX DOCD: CPT | Mod: CPTII,,, | Performed by: NURSE PRACTITIONER

## 2024-03-05 PROCEDURE — 3074F SYST BP LT 130 MM HG: CPT | Mod: CPTII,,, | Performed by: NURSE PRACTITIONER

## 2024-03-05 PROCEDURE — 99213 OFFICE O/P EST LOW 20 MIN: CPT | Mod: ,,, | Performed by: NURSE PRACTITIONER

## 2024-03-05 PROCEDURE — 1159F MED LIST DOCD IN RCRD: CPT | Mod: CPTII,,, | Performed by: NURSE PRACTITIONER

## 2024-04-04 ENCOUNTER — OFFICE VISIT (OUTPATIENT)
Dept: HEMATOLOGY/ONCOLOGY | Facility: CLINIC | Age: 34
End: 2024-04-04
Payer: MEDICAID

## 2024-04-04 VITALS
TEMPERATURE: 98 F | WEIGHT: 170.13 LBS | SYSTOLIC BLOOD PRESSURE: 116 MMHG | HEART RATE: 104 BPM | HEIGHT: 65 IN | OXYGEN SATURATION: 100 % | BODY MASS INDEX: 28.35 KG/M2 | RESPIRATION RATE: 18 BRPM | DIASTOLIC BLOOD PRESSURE: 79 MMHG

## 2024-04-04 DIAGNOSIS — G89.29 ENCOUNTER FOR CHRONIC PAIN MANAGEMENT: Primary | ICD-10-CM

## 2024-04-04 DIAGNOSIS — D57.1 SICKLING DISORDER DUE TO HEMOGLOBIN S WITHOUT CRISIS: ICD-10-CM

## 2024-04-04 DIAGNOSIS — D57.00 SICKLE CELL PAIN CRISIS: ICD-10-CM

## 2024-04-04 DIAGNOSIS — D57.00 HB-SS DISEASE WITH CRISIS: ICD-10-CM

## 2024-04-04 PROCEDURE — 3078F DIAST BP <80 MM HG: CPT | Mod: CPTII,,, | Performed by: NURSE PRACTITIONER

## 2024-04-04 PROCEDURE — 3008F BODY MASS INDEX DOCD: CPT | Mod: CPTII,,, | Performed by: NURSE PRACTITIONER

## 2024-04-04 PROCEDURE — 3074F SYST BP LT 130 MM HG: CPT | Mod: CPTII,,, | Performed by: NURSE PRACTITIONER

## 2024-04-04 PROCEDURE — 1159F MED LIST DOCD IN RCRD: CPT | Mod: CPTII,,, | Performed by: NURSE PRACTITIONER

## 2024-04-04 PROCEDURE — 99214 OFFICE O/P EST MOD 30 MIN: CPT | Mod: ,,, | Performed by: NURSE PRACTITIONER

## 2024-04-04 RX ORDER — OXYCODONE AND ACETAMINOPHEN 10; 325 MG/1; MG/1
1 TABLET ORAL EVERY 6 HOURS PRN
Qty: 120 TABLET | Refills: 0 | Status: SHIPPED | OUTPATIENT
Start: 2024-04-04 | End: 2024-05-02 | Stop reason: SDUPTHER

## 2024-04-04 RX ORDER — FENTANYL 100 UG/H
PATCH TRANSDERMAL
Qty: 10 PATCH | Refills: 0 | Status: SHIPPED | OUTPATIENT
Start: 2024-04-04 | End: 2024-05-02 | Stop reason: SDUPTHER

## 2024-04-04 RX ORDER — PROMETHAZINE HYDROCHLORIDE 25 MG/1
12.5 TABLET ORAL EVERY 6 HOURS PRN
Qty: 30 TABLET | Refills: 4 | Status: SHIPPED | OUTPATIENT
Start: 2024-04-04 | End: 2024-05-30 | Stop reason: SDUPTHER

## 2024-04-04 NOTE — PROGRESS NOTES
"  Diagnosis:  - SCD    Treatment History:  - Folic acid, Hydrea, Jadenu    Current Treatment:   - hydrea 1500 mg daily  - Jadenu 1080 mg daily  - Folic acid 1 mg daily        Subjective:       HPI  Anya Matt Leal  34 y.o.  female with past medical history significant for SCD here for follow-up  According to the patient she had been on folic acid, hydroxyurea and jadenu in the past.  Patient had not been taking her HU and Jadenu recently.  Patient had multiple transfusions over the year, requiring almost 10 transfusion last year.  Denies any history of exchange transfusion or acute chest syndrome.  Denies any history of avascular necrosis.  Denies any history of stroke. No ACS ever in her life never intubated Never full exchange transfusions >20 lifetime transfusions, in 2019 pt reports ~10 units of PRBC overall  No AVN  Never told she has iron overload and has never been on iron chelation prior to initial presentation  No strokes  Not on any pediatric transfusion regimen  >10 pain crisis per year (occur almost with every cycle requiring frequent hospital visits)  hospitalization 2/2021, had partial exchange of 1 unit  October 2023 reports pain crisis the last week of October, which involved a hospital admission for 2 days along with 2 units of PRBCs, IV pain medication, and IVFs.    Interval History:   4/4/2024:  Ms. 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  She reports.   Pain medication refilled on 4/4/2024.  She rates her chronic pain of of the chest arms, legs, and back an "6" on 0-10 pain scale.  She denies any fever, chills, headaches, fatigue, dizziness, cough, SOB, abnormal bleeding, rash, nausea, weight loss, abdominal pain, flank pain, change in bowel or bladder habits, no lower extremity pain or swelling, no leg ulcers, and no neuropathy.  Labs dated 4/3/2024:  Creatinine 0.80, AST and ALT WNL, T. Bili 2.4, , ferritin 1983, folic acid 8.95, WBC " 14.09, Hgb 8.2, Hct 23.4, Plt 472,000.  She reports compliance with Folic acid, Hydrea, and Jadence.   Eating and drinking.  Gained weight from last visit.  No recent hospitalizations, infections, or illnesses.   Past Medical History:   Diagnosis Date    Calculus of gallbladder without cholecystitis without obstruction     Encounter for blood transfusion     Flu 04/2019    sore throat    Sickle cell anemia       Past Surgical History:   Procedure Laterality Date    GALLBLADDER SURGERY  01/05/2024    MEDIPORT INSERTION, SINGLE      TUBAL LIGATION       Social History     Socioeconomic History    Marital status:    Tobacco Use    Smoking status: Never    Smokeless tobacco: Never   Substance and Sexual Activity    Alcohol use: No    Drug use: No    Sexual activity: Yes     Partners: Male      Family History   Problem Relation Age of Onset    Hypertension Mother       Review of patient's allergies indicates:   Allergen Reactions    Aspirin     Butorphanol     Compazine [prochlorperazine edisylate]     Ketorolac      Other reaction(s): other    Morpholine analogues     Nubain [nalbuphine]     Stadol [butorphanol tartrate]     Tramadol Rash     hives      CONSTITUTIONAL: no fevers, no chills, no weight loss, + fatigue, no weakness  HEMATOLOGIC: no abnormal bleeding, no abnormal bruising, no drenching night sweats  ONCOLOGIC: no new masses or lumps  HEENT: no vision loss, no tinnitus or hearing loss, no nose bleeding, no dysphagia, no odynophagia  CVS: no chest pain, no palpitations, no dyspnea on exertion  RESP: no shortness of breath, no hemoptysis, no cough  GI: + nausea, no vomiting, no diarrhea, no constipation, no melena, no hematochezia, no hematemesis, no abdominal pain, no increase in abdominal girth  : no dysuria, no hematuria, no hesitancy, no scrotal swelling, no discharge  INTEGUMENT: no rashes, no abnormal bruising, no nail pitting, no hyperpigmentation  NEURO: no falls, no memory loss, no  paresthesias or dysesthesias, no urofecal incontinence or retention, no loss of strength on any extremity  MSK: + muscle, bone, and joint pain all extremities and back.    PSYCH: no suicidal or homicidal ideation, no depression, no insomnia, no anhedonia  ENDOCRINE: no heat or cold intolerance, no polyuria, no polydipsia    Objective:        Vitals:    04/04/24 1114   BP: 116/79   Pulse: 104   Resp: 18   Temp: 98.3 °F (36.8 °C)             ECOG PS 0  GA: AAOx3, NAD  HEENT:  Scleral icterus EOMI, good dentition, moist oral mucous membranes  LYMPH: no cervical, axillary or supraclavicular adenopathy  CVS: s1s2 RRR, no M/R/G, port in place without surrounding edema or erythema  RESP: CTA b/l, no crackles, no wheezes or rhonchi  ABD: soft, NT, ND, BS+, no hepatosplenomegaly  EXT: no deformities, no pedal edema  SKIN: no rashes, warm and dry  NEURO: normal mentation, strength 5/5 on all 4 extremities, no sensory deficits      LABS / IMAGING    3/4/2024:  Creatinine 0.88, Total Bili 2.2, WBC 13.55, Hgb 8.0, Hct 24.1, Platelets 534,000.  1/31/2024: Creatinine 0.83, Total Bili 2.0, , ferritin 2751, folic acid 8.23, WBC 12.39, Hgb 8.3, Hct 25.2, Platelets 512,000, Hgb electrophoresis Hgb F 5.7.   Other labs reviewed, Hg ranged 7-10, normal Cr  9/5/17 Hg A 9.3 Hg A2 3.8 Hg F 3.6 Hg S 83.3  6/3/2020 hgb 9.5, WBC 10.7, , MCV 96.7, RDW 18.4 Hg A 30.6 Hg F 5.2% Hg S 60.7%  8/10/20 Hg F 7.8% Hg A 10.8% Hg S 77.4% ferritin 2997 Cr 0.64 Hg 9 .3  ANC 7.9  10/5/20 Cr 0.63, Tbili 2.4, , Ferritin 2556, WBC 8.23, Hgb 8.9, .5, , retic 8.41, ANC 4.78, Urine Protein 11.56, Hgb A 0% A21 3.8% F 8.9% S 83.5%  11/23/20 Cr 0.69, TP 8.5, ALB 4.6, alk phos 77, T bili 2.2, , iron 151, TIBC 194, iron sat 78%, ferritin 2746, WBC 8.47, Hgb 9.2, , retic 26K, ANC 5.69, urine protein 40.21, Hgb F 10.2, Hgb S 83.8  1/27/21 Hg A 0% A2 3.8 F 11.3% S 82.5% Other 2.4% Cr 0.61 Alb 4.3 TP 7.8 Tbili  2.1 AST 19 ALT 10  Iron 128 Ferritin 2218 WBC 9.87 Hg 9.1 .2  ANC 6.6 Abs retic 125k urine protein 12.17 mg/dl  3/31/21 Cr 0.69, TP 8.1, T bili 2.2, , Ferritin 2166, Folic acid 19.68, WBC 8.23, Hgb 8.0, .3, , ANC 3.90, abs retic ~175K, urine protein neg, urine culture neg, Hgb A 0%, Hgb A2 4.1%, Hgb F 11.7%, Hgb S 80.5% other 3.7%  8/23/21 WBC 9.59, Hgb 7.9, .4, , Cr 0.74, Tbili 1.5, , Folic acid 10.48, Ferritin 1460, UA protein neg, Hgb A2 3.5% F 10.8% S 82.6%  10/15/21 Hg A2 3.2% Hg F 9.2% Hg S 84.5% Cr 0.67 Alb 4.5 Tbili 1.8  Ferritin 1850 Folate 11.45 WBC 10.47 Hg 8.5  Retic 192k UA protein neg  12/8/21 Hg A 18.1% A2 3.1% F 7.7% S 71.1%, , Ferritin 1654, Folic acid 13.44, WBC 10.39, Hgb 8.1, PLT 42, Retic 6.13%, Urine protein neg  2/3/22 Cr 0.75 Alb 4.7 TP 8.2 Tbili 2.9  Ferritin 1729 Folate 15.9 WBC 11.75 Hg 7.4 RBC 2.25 MCV 97.8 RDW 17.9  Retic 5.85% Urine protein neg Hg A 10.6% Hg F 8.3% Hg S 78.1% Hg A2 3%  4/25/22 Cr 0.77, Alb 4.5, TP 7.8, Tbili 1.8, , Ferritin 1584, folic acid 15, WBC 9.90, Hgb 8.2, , ANC 3.77, urine protein 10.47 mg/dL, hgb electrophoresis pending**  - 10/21/2022:  WBC 15.63, hemoglobin 7.4, hematocrit 21.1, platelet count 454K  - 12/01/2022: WBC 13.36, hemoglobin 7.5, hematocrit 22.6, platelet count 4 4 6.  MCV 95.8    -04/18/23: Chloride 109, cr 0.71, alb 4.5, ca 9.5, TB 2.4, , LFTs WNL, Ferritin 1457, uric acid 7.2, wbc 11.87, hgb 8.0, plt 508, ANC 4.51, retic 9.39,   -5/19/23 CMP WNL except bili 2.9, , ferritin 1467, folate 16, UA 8.1 vit D 4, , WBC 15.87, HGB 6.9, HCT 20,  , retic 9.46,   06/20/2023:  WBC count 10.3, hemoglobin 8.5, MCV 97.6, platelet count 5 110, ANC 5.9, creatinine 0.72, albumin 4.6, calcium 9.2, LFTs within normal limits, bilirubin 1.7, ferritin 1261, hemoglobin F was ordered but not done.  7/29/2023:  CMP:  abnormal only:  Cl 110,  bilirubin 1.9.  , Ferritin 1449, Uric acid 7.5, Vit D 6 ng/mL,  CBC:  abnormal only:  WBC 13.87, Hgb 7.7, Hct 22.1, MCV 98.7, RDW-CV 16.6, platelets 387,000, retic 8.54, Lymph 6.44, mono 1.18.   8/30/2023:  CMP:  Cl- 109, BUN 6.97, creatinine 0.78,  Total Bili 2.1, , Ferritin 1575.  CBC:  WBC 11.66, Hgb 7.9, Hct 23.5, platelets 428,000, Retic 8.23.  8/23/2023: Hgb F 6.1.  9/26/2023:  Cl- 111, Creatinine 0.84, Total Bili 2.4, , Ferritin 2215, WBC 16.06, Hgb 7.0, Hct 21.0, Plt 545,000, retic 4.18, Hgb F 5.4.  12/06/2023:  Creatinine 0.80, Total Bili 2.9, AST and ALT WNL, , ferritin 1637, folic acid 9.47, WBC 12.65, Hgb 7.6, Hct 22.4, Plt 413,000.  No protein electrophoresis.    1/2/2024:  Creatinine 0.93, Total Bili 1.6, , ferritin 1489, folic acid 5.02, WBC 15.48, Hgb 7.0, Hct 21.4, Platelets 926,000, retic 10.94.     Imaging:  reviewed, X-Rays of b/l hips 11/2019 no AVN, CXR unremarkable    Assessment:   1. Sickle cell disease D57.1 C/w hydrea 1500mg daily, Hg F not at goal on most recent lab work, Hgb electrophoresis obtained today still pending  C/w folic acid 1mg daily  Adequate PO hydration  Analgesia with fentanyl and percocet 10mg as needed. C/w fentanyl 100mcg/hr,   Avoid overtransfusion. Only transfuse if symptomatic anemia. Do not transfuse for simple pain crisis  Of note, she did receive 3 days of Desferal and was transfused 1 unit PRBC during hospitalization at [facility] 11/2021.  Desferal x 10 days planned for 3/2022 but patient decided against Desferal, will continue to monitor ferritin closely and will Obtain hg electrophoresis, ferritin, iron, tibc, CBC w/ diff, CMP, folate, retic, UA for urine protein 1 week prior to return visit  Unknown prior vaccinations. She believes she last got PNA vaccinations ~5 years ago.  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        Plan:   4/4/2024:    - Reinforced medication compliance, good oral hydration, diet, and rest for current pain crisis.  - Continue folic acid 1 mg daily,  - Continue hydroxyurea 1500 daily  - Continue Jadenu 1080 daily  - C/w antinausea meds  - Continue with pain medication for all pain crisis.    - Refilled pain medication 4/4/2024 after checking LA .  -- Repeat labs on follow up including hemoglobin electrophoresis- RTC 4 WEEKS.  Needs ophthalmology follow-up, highly encouraged today.  Needs to contact Medicaid regarding PCP to manage wellness visits and vaccinations.    The patient is in agreement with today's plan of care.  All questions answered.  Patient is aware to contact our office for any problems or concerns prior to next visit.      Vivian Hankins, MSN-ED, APRN, AGACNP-BC, OCN

## 2024-05-02 DIAGNOSIS — D57.00 HB-SS DISEASE WITH CRISIS: ICD-10-CM

## 2024-05-02 DIAGNOSIS — D57.00 SICKLE CELL PAIN CRISIS: ICD-10-CM

## 2024-05-02 RX ORDER — ONDANSETRON 4 MG/1
8 TABLET, FILM COATED ORAL EVERY 8 HOURS PRN
Qty: 30 TABLET | Refills: 3 | Status: SHIPPED | OUTPATIENT
Start: 2024-05-02 | End: 2024-05-30 | Stop reason: SDUPTHER

## 2024-05-02 RX ORDER — FENTANYL 100 UG/H
PATCH TRANSDERMAL
Qty: 10 PATCH | Refills: 0 | Status: SHIPPED | OUTPATIENT
Start: 2024-05-02 | End: 2024-05-30 | Stop reason: SDUPTHER

## 2024-05-02 RX ORDER — OXYCODONE AND ACETAMINOPHEN 10; 325 MG/1; MG/1
1 TABLET ORAL EVERY 6 HOURS PRN
Qty: 120 TABLET | Refills: 0 | Status: SHIPPED | OUTPATIENT
Start: 2024-05-02 | End: 2024-05-30 | Stop reason: SDUPTHER

## 2024-05-30 ENCOUNTER — OFFICE VISIT (OUTPATIENT)
Dept: HEMATOLOGY/ONCOLOGY | Facility: CLINIC | Age: 34
End: 2024-05-30
Payer: MEDICAID

## 2024-05-30 VITALS
TEMPERATURE: 99 F | WEIGHT: 167.13 LBS | DIASTOLIC BLOOD PRESSURE: 70 MMHG | HEART RATE: 100 BPM | OXYGEN SATURATION: 98 % | HEIGHT: 65 IN | BODY MASS INDEX: 27.85 KG/M2 | SYSTOLIC BLOOD PRESSURE: 96 MMHG | RESPIRATION RATE: 14 BRPM

## 2024-05-30 DIAGNOSIS — D57.1 SICKLING DISORDER DUE TO HEMOGLOBIN S WITHOUT CRISIS: ICD-10-CM

## 2024-05-30 DIAGNOSIS — D57.00 HB-SS DISEASE WITH CRISIS: ICD-10-CM

## 2024-05-30 DIAGNOSIS — G89.29 ENCOUNTER FOR CHRONIC PAIN MANAGEMENT: Primary | ICD-10-CM

## 2024-05-30 DIAGNOSIS — E83.111 IRON OVERLOAD DUE TO REPEATED RED BLOOD CELL TRANSFUSIONS: ICD-10-CM

## 2024-05-30 DIAGNOSIS — D57.00 SICKLE CELL PAIN CRISIS: ICD-10-CM

## 2024-05-30 PROCEDURE — 1160F RVW MEDS BY RX/DR IN RCRD: CPT | Mod: CPTII,,, | Performed by: NURSE PRACTITIONER

## 2024-05-30 PROCEDURE — 99213 OFFICE O/P EST LOW 20 MIN: CPT | Mod: ,,, | Performed by: NURSE PRACTITIONER

## 2024-05-30 PROCEDURE — 3074F SYST BP LT 130 MM HG: CPT | Mod: CPTII,,, | Performed by: NURSE PRACTITIONER

## 2024-05-30 PROCEDURE — 1159F MED LIST DOCD IN RCRD: CPT | Mod: CPTII,,, | Performed by: NURSE PRACTITIONER

## 2024-05-30 PROCEDURE — 3008F BODY MASS INDEX DOCD: CPT | Mod: CPTII,,, | Performed by: NURSE PRACTITIONER

## 2024-05-30 PROCEDURE — 3078F DIAST BP <80 MM HG: CPT | Mod: CPTII,,, | Performed by: NURSE PRACTITIONER

## 2024-05-30 RX ORDER — ONDANSETRON 4 MG/1
8 TABLET, FILM COATED ORAL EVERY 8 HOURS PRN
Qty: 90 TABLET | Refills: 6 | Status: SHIPPED | OUTPATIENT
Start: 2024-05-30

## 2024-05-30 RX ORDER — FENTANYL 100 UG/H
PATCH TRANSDERMAL
Qty: 10 PATCH | Refills: 0 | Status: SHIPPED | OUTPATIENT
Start: 2024-05-30

## 2024-05-30 RX ORDER — PROMETHAZINE HYDROCHLORIDE 25 MG/1
25 TABLET ORAL EVERY 6 HOURS PRN
Qty: 90 TABLET | Refills: 5 | Status: SHIPPED | OUTPATIENT
Start: 2024-05-30

## 2024-05-30 RX ORDER — OXYCODONE AND ACETAMINOPHEN 10; 325 MG/1; MG/1
1 TABLET ORAL EVERY 6 HOURS PRN
Qty: 120 TABLET | Refills: 0 | Status: SHIPPED | OUTPATIENT
Start: 2024-05-30 | End: 2025-05-30

## 2024-05-30 NOTE — PROGRESS NOTES
"  Diagnosis:  - SCD    Treatment History:  - Folic acid, Hydrea, Jadenu    Current Treatment:   - hydrea 1500 mg daily  - Jadenu 1080 mg daily  - Folic acid 1 mg daily        Subjective:       LAUREN Joyner Matt Leal  34 y.o.  female with past medical history significant for SCD here for follow-up  According to the patient she had been on folic acid, hydroxyurea and jadenu in the past.  Patient had not been taking her HU and Jadenu recently.  Patient had multiple transfusions over the year, requiring almost 10 transfusion last year.  Denies any history of exchange transfusion or acute chest syndrome.  Denies any history of avascular necrosis.  Denies any history of stroke. No ACS ever in her life never intubated Never full exchange transfusions >20 lifetime transfusions, in 2019 pt reports ~10 units of PRBC overall  No AVN  Never told she has iron overload and has never been on iron chelation prior to initial presentation  No strokes  Not on any pediatric transfusion regimen  >10 pain crisis per year (occur almost with every cycle requiring frequent hospital visits)  hospitalization 2/2021, had partial exchange of 1 unit  October 2023 reports pain crisis the last week of October, which involved a hospital admission for 2 days along with 2 units of PRBCs, IV pain medication, and IVFs.  May 11-20, 2024:  Sickle cell vaso-occlusive pain crisis with 2 units of blood, IVF, and pain medication.    Interval History:   5/30/2024:  Ms. 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  She reports feeling fatigued and weak.   She is requesting pain medication refill today .  She reports hospitalization from May 11-20, 2024 for sickle cell crisis.  She was given 2 units of PRBCs with IVF, and IV pain medication.  She rates her chronic pain of of the chest arms, legs, and back an "7" on 0-10 pain scale.  She confirms low grade temperature of 99.0 today.  She denies, chills, headaches, " dizziness, cough, SOB, abnormal bleeding, rash, nausea, weight loss, abdominal pain, flank pain, change in bowel or bladder habits, no lower extremity pain or swelling, no leg ulcers, and no neuropathy.  Labs dated 5/6/2024 :  Creatinine 0.98, AST 44, and ALT WNL, T. Bili 1.9, , ferritin 2235, folic acid 8.24, WBC 16.49, Hgb 7.4, Hct 21.3, Plt 457,000.  She reports compliance with Folic acid, Hydrea, and Jadence.   Reports eating when she feels not so fatigued.  Drinking greater than 6 bottles of water a day.  Weight is stable.  She reports up-to-date on all vaccinations.    Past Medical History:   Diagnosis Date    Calculus of gallbladder without cholecystitis without obstruction     Encounter for blood transfusion     Flu 04/2019    sore throat    Sickle cell anemia       Past Surgical History:   Procedure Laterality Date    GALLBLADDER SURGERY  01/05/2024    MEDIPORT INSERTION, SINGLE      TUBAL LIGATION       Social History     Socioeconomic History    Marital status:    Tobacco Use    Smoking status: Never    Smokeless tobacco: Never   Substance and Sexual Activity    Alcohol use: No    Drug use: No    Sexual activity: Yes     Partners: Male      Family History   Problem Relation Name Age of Onset    Hypertension Mother        Review of patient's allergies indicates:   Allergen Reactions    Aspirin     Butorphanol     Compazine [prochlorperazine edisylate]     Ketorolac      Other reaction(s): other    Morpholine analogues     Nubain [nalbuphine]     Stadol [butorphanol tartrate]     Tramadol Rash     hives      CONSTITUTIONAL: no fevers, no chills, no weight loss, + fatigue, no weakness  HEMATOLOGIC: no abnormal bleeding, no abnormal bruising, no drenching night sweats  ONCOLOGIC: no new masses or lumps  HEENT: no vision loss, no tinnitus or hearing loss, no nose bleeding, no dysphagia, no odynophagia  CVS: no chest pain, no palpitations, no dyspnea on exertion  RESP: no shortness of breath, no  hemoptysis, no cough  GI: + nausea, no vomiting, no diarrhea, no constipation, no melena, no hematochezia, no hematemesis, no abdominal pain, no increase in abdominal girth  : no dysuria, no hematuria, no hesitancy, no scrotal swelling, no discharge  INTEGUMENT: no rashes, no abnormal bruising, no nail pitting, no hyperpigmentation  NEURO: no falls, no memory loss, no paresthesias or dysesthesias, no urofecal incontinence or retention, no loss of strength on any extremity  MSK: + muscle, bone, and joint pain all extremities and back.    PSYCH: no suicidal or homicidal ideation, no depression, no insomnia, no anhedonia  ENDOCRINE: no heat or cold intolerance, no polyuria, no polydipsia    Objective:        Vitals:    05/30/24 1050   BP: 96/70   Pulse: 100   Resp: 14   Temp: 99.4 °F (37.4 °C)               ECOG PS 0  GA: AAOx3, NAD  HEENT:  Scleral icterus EOMI, good dentition, moist oral mucous membranes  LYMPH: no cervical, axillary or supraclavicular adenopathy  CVS: s1s2 RRR, no M/R/G, port in place without surrounding edema or erythema  RESP: CTA b/l, no crackles, no wheezes or rhonchi  ABD: soft, NT, ND, BS+, no hepatosplenomegaly  EXT: no deformities, no pedal edema  SKIN: no rashes, warm and dry  NEURO: normal mentation, strength 5/5 on all 4 extremities, no sensory deficits      LABS / IMAGING    3/4/2024:  Creatinine 0.88, Total Bili 2.2, WBC 13.55, Hgb 8.0, Hct 24.1, Platelets 534,000.  1/31/2024: Creatinine 0.83, Total Bili 2.0, , ferritin 2751, folic acid 8.23, WBC 12.39, Hgb 8.3, Hct 25.2, Platelets 512,000, Hgb electrophoresis Hgb F 5.7.   Other labs reviewed, Hg ranged 7-10, normal Cr  9/5/17 Hg A 9.3 Hg A2 3.8 Hg F 3.6 Hg S 83.3  6/3/2020 hgb 9.5, WBC 10.7, , MCV 96.7, RDW 18.4 Hg A 30.6 Hg F 5.2% Hg S 60.7%  8/10/20 Hg F 7.8% Hg A 10.8% Hg S 77.4% ferritin 2997 Cr 0.64 Hg 9 .3  ANC 7.9  10/5/20 Cr 0.63, Tbili 2.4, , Ferritin 2556, WBC 8.23, Hgb 8.9, .5, PLT  421, retic 8.41, ANC 4.78, Urine Protein 11.56, Hgb A 0% A21 3.8% F 8.9% S 83.5%  11/23/20 Cr 0.69, TP 8.5, ALB 4.6, alk phos 77, T bili 2.2, , iron 151, TIBC 194, iron sat 78%, ferritin 2746, WBC 8.47, Hgb 9.2, , retic 26K, ANC 5.69, urine protein 40.21, Hgb F 10.2, Hgb S 83.8  1/27/21 Hg A 0% A2 3.8 F 11.3% S 82.5% Other 2.4% Cr 0.61 Alb 4.3 TP 7.8 Tbili 2.1 AST 19 ALT 10  Iron 128 Ferritin 2218 WBC 9.87 Hg 9.1 .2  ANC 6.6 Abs retic 125k urine protein 12.17 mg/dl  3/31/21 Cr 0.69, TP 8.1, T bili 2.2, , Ferritin 2166, Folic acid 19.68, WBC 8.23, Hgb 8.0, .3, , ANC 3.90, abs retic ~175K, urine protein neg, urine culture neg, Hgb A 0%, Hgb A2 4.1%, Hgb F 11.7%, Hgb S 80.5% other 3.7%  8/23/21 WBC 9.59, Hgb 7.9, .4, , Cr 0.74, Tbili 1.5, , Folic acid 10.48, Ferritin 1460, UA protein neg, Hgb A2 3.5% F 10.8% S 82.6%  10/15/21 Hg A2 3.2% Hg F 9.2% Hg S 84.5% Cr 0.67 Alb 4.5 Tbili 1.8  Ferritin 1850 Folate 11.45 WBC 10.47 Hg 8.5  Retic 192k UA protein neg  12/8/21 Hg A 18.1% A2 3.1% F 7.7% S 71.1%, , Ferritin 1654, Folic acid 13.44, WBC 10.39, Hgb 8.1, PLT 42, Retic 6.13%, Urine protein neg  2/3/22 Cr 0.75 Alb 4.7 TP 8.2 Tbili 2.9  Ferritin 1729 Folate 15.9 WBC 11.75 Hg 7.4 RBC 2.25 MCV 97.8 RDW 17.9  Retic 5.85% Urine protein neg Hg A 10.6% Hg F 8.3% Hg S 78.1% Hg A2 3%  4/25/22 Cr 0.77, Alb 4.5, TP 7.8, Tbili 1.8, , Ferritin 1584, folic acid 15, WBC 9.90, Hgb 8.2, , ANC 3.77, urine protein 10.47 mg/dL, hgb electrophoresis pending**  - 10/21/2022:  WBC 15.63, hemoglobin 7.4, hematocrit 21.1, platelet count 454K  - 12/01/2022: WBC 13.36, hemoglobin 7.5, hematocrit 22.6, platelet count 4 4 6.  MCV 95.8    -04/18/23: Chloride 109, cr 0.71, alb 4.5, ca 9.5, TB 2.4, , LFTs WNL, Ferritin 1457, uric acid 7.2, wbc 11.87, hgb 8.0, plt 508, ANC 4.51, retic 9.39,   -5/19/23 CMP WNL except bili 2.9,  , ferritin 1467, folate 16, UA 8.1 vit D 4, , WBC 15.87, HGB 6.9, HCT 20,  , retic 9.46,   06/20/2023:  WBC count 10.3, hemoglobin 8.5, MCV 97.6, platelet count 5 110, ANC 5.9, creatinine 0.72, albumin 4.6, calcium 9.2, LFTs within normal limits, bilirubin 1.7, ferritin 1261, hemoglobin F was ordered but not done.  7/29/2023:  CMP:  abnormal only:  Cl 110, bilirubin 1.9.  , Ferritin 1449, Uric acid 7.5, Vit D 6 ng/mL,  CBC:  abnormal only:  WBC 13.87, Hgb 7.7, Hct 22.1, MCV 98.7, RDW-CV 16.6, platelets 387,000, retic 8.54, Lymph 6.44, mono 1.18.   8/30/2023:  CMP:  Cl- 109, BUN 6.97, creatinine 0.78,  Total Bili 2.1, , Ferritin 1575.  CBC:  WBC 11.66, Hgb 7.9, Hct 23.5, platelets 428,000, Retic 8.23.  8/23/2023: Hgb F 6.1.  9/26/2023:  Cl- 111, Creatinine 0.84, Total Bili 2.4, , Ferritin 2215, WBC 16.06, Hgb 7.0, Hct 21.0, Plt 545,000, retic 4.18, Hgb F 5.4.  12/06/2023:  Creatinine 0.80, Total Bili 2.9, AST and ALT WNL, , ferritin 1637, folic acid 9.47, WBC 12.65, Hgb 7.6, Hct 22.4, Plt 413,000.  No protein electrophoresis.    1/2/2024:  Creatinine 0.93, Total Bili 1.6, , ferritin 1489, folic acid 5.02, WBC 15.48, Hgb 7.0, Hct 21.4, Platelets 926,000, retic 10.94.     Imaging:  reviewed, X-Rays of b/l hips 11/2019 no AVN, CXR unremarkable    Assessment:   1. Sickle cell disease D57.1 C/w hydrea 1500mg daily, Hg F not at goal on most recent lab work, Hgb electrophoresis obtained today still pending  C/w folic acid 1mg daily  Adequate PO hydration  Analgesia with fentanyl and percocet 10mg as needed. C/w fentanyl 100mcg/hr,   Avoid overtransfusion. Only transfuse if symptomatic anemia. Do not transfuse for simple pain crisis  Of note, she did receive 3 days of Desferal and was transfused 1 unit PRBC during hospitalization at [facility] 11/2021.  Desferal x 10 days planned for 3/2022 but patient decided against Desferal, will continue to monitor ferritin closely and will  Obtain hg electrophoresis, ferritin, iron, tibc, CBC w/ diff, CMP, folate, retic, UA for urine protein 1 week prior to return visit  Unknown prior vaccinations. She believes she last got PNA vaccinations ~5 years ago.  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        Plan:       - Reinforced medication compliance, good oral hydration, diet, and rest for current pain crisis.  - Continue folic acid 1 mg daily,  - Continue hydroxyurea 1500 daily  - Continue Jadenu 1080 daily  - C/w antinausea meds  - Continue with Periactin for appetite  - Continue with pain medication for all pain crisis.    - Refilled pain medication today after checking LA .  -- Repeat labs on follow up including hemoglobin electrophoresis- RTC 4 WEEKS.  -  Discussed more frequent sickle cell crisis, weakness, and fatigue, along with frequent PRBC infusions.  She has agreed to go to the [facility] in [place] for consultation.  Will send appropriate paperwork to the Clinic today.  Needs ophthalmology follow-up, highly encouraged today.  Needs to contact Medicaid regarding PCP to manage wellness visits and vaccinations.    The patient is in agreement with today's plan of care.  All questions answered.  Patient is aware to contact our office for any problems or concerns prior to next visit.      Vivian Hankins, MSN-ED, APRN, AGACNP-BC, OCN

## 2024-06-27 ENCOUNTER — OFFICE VISIT (OUTPATIENT)
Dept: HEMATOLOGY/ONCOLOGY | Facility: CLINIC | Age: 34
End: 2024-06-27
Payer: MEDICAID

## 2024-06-27 VITALS
RESPIRATION RATE: 14 BRPM | SYSTOLIC BLOOD PRESSURE: 128 MMHG | DIASTOLIC BLOOD PRESSURE: 79 MMHG | OXYGEN SATURATION: 97 % | HEIGHT: 65 IN | WEIGHT: 169.13 LBS | HEART RATE: 107 BPM | BODY MASS INDEX: 28.18 KG/M2 | TEMPERATURE: 98 F

## 2024-06-27 DIAGNOSIS — D57.1 SICKLING DISORDER DUE TO HEMOGLOBIN S WITHOUT CRISIS: ICD-10-CM

## 2024-06-27 DIAGNOSIS — D57.00 SICKLE CELL PAIN CRISIS: Primary | ICD-10-CM

## 2024-06-27 DIAGNOSIS — D57.00 HB-SS DISEASE WITH CRISIS: ICD-10-CM

## 2024-06-27 DIAGNOSIS — E83.111 IRON OVERLOAD DUE TO REPEATED RED BLOOD CELL TRANSFUSIONS: ICD-10-CM

## 2024-06-27 DIAGNOSIS — G89.29 ENCOUNTER FOR CHRONIC PAIN MANAGEMENT: ICD-10-CM

## 2024-06-27 PROCEDURE — 3008F BODY MASS INDEX DOCD: CPT | Mod: CPTII,,, | Performed by: NURSE PRACTITIONER

## 2024-06-27 PROCEDURE — 99213 OFFICE O/P EST LOW 20 MIN: CPT | Mod: ,,, | Performed by: NURSE PRACTITIONER

## 2024-06-27 PROCEDURE — 1160F RVW MEDS BY RX/DR IN RCRD: CPT | Mod: CPTII,,, | Performed by: NURSE PRACTITIONER

## 2024-06-27 PROCEDURE — 1159F MED LIST DOCD IN RCRD: CPT | Mod: CPTII,,, | Performed by: NURSE PRACTITIONER

## 2024-06-27 PROCEDURE — 3078F DIAST BP <80 MM HG: CPT | Mod: CPTII,,, | Performed by: NURSE PRACTITIONER

## 2024-06-27 PROCEDURE — 3074F SYST BP LT 130 MM HG: CPT | Mod: CPTII,,, | Performed by: NURSE PRACTITIONER

## 2024-06-27 RX ORDER — FENTANYL 100 UG/H
PATCH TRANSDERMAL
Qty: 10 PATCH | Refills: 0 | Status: SHIPPED | OUTPATIENT
Start: 2024-06-27

## 2024-06-27 RX ORDER — OXYCODONE AND ACETAMINOPHEN 10; 325 MG/1; MG/1
1 TABLET ORAL EVERY 6 HOURS PRN
Qty: 120 TABLET | Refills: 0 | Status: SHIPPED | OUTPATIENT
Start: 2024-06-27 | End: 2025-06-27

## 2024-06-27 NOTE — PROGRESS NOTES
"  Diagnosis:  - SCD    Treatment History:  - Folic acid, Hydrea, Jadenu    Current Treatment:   - hydrea 1500 mg daily  - Jadenu 1080 mg daily  - Folic acid 1 mg daily        Subjective:       LAUREN Joyner Matt Leal  34 y.o.  female with past medical history significant for SCD here for follow-up  According to the patient she had been on folic acid, hydroxyurea and jadenu in the past.  Patient had not been taking her HU and Jadenu recently.  Patient had multiple transfusions over the year, requiring almost 10 transfusion last year.  Denies any history of exchange transfusion or acute chest syndrome.  Denies any history of avascular necrosis.  Denies any history of stroke. No ACS ever in her life never intubated Never full exchange transfusions >20 lifetime transfusions, in 2019 pt reports ~10 units of PRBC overall  No AVN  Never told she has iron overload and has never been on iron chelation prior to initial presentation  No strokes  Not on any pediatric transfusion regimen  >10 pain crisis per year (occur almost with every cycle requiring frequent hospital visits)  hospitalization 2/2021, had partial exchange of 1 unit  October 2023 reports pain crisis the last week of October, which involved a hospital admission for 2 days along with 2 units of PRBCs, IV pain medication, and IVFs.  May 11-20, 2024:  Sickle cell vaso-occlusive pain crisis with 2 units of blood, IVF, and pain medication.    Interval History:   6/27/2024:  Ms. 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  She is requesting pain medication refill today.  She rates her chronic pain of of the legs, and back an "8" on 0-10 pain scale.  She denies, chills, headaches, dizziness, cough, SOB, abnormal bleeding, rash, nausea, weight loss, abdominal pain, flank pain, change in bowel or bladder habits, no lower extremity pain or swelling, no leg ulcers, and no neuropathy.  Labs dated 6/26/2024 :  Creatinine 0.78, AST " 29, and ALT 11, T. Bili 2.59, , ferritin 2460, folic acid 8.0, .18, Hgb 7.6, Hct 23.3, Plt 548,000.  She reports compliance with Folic acid, Hydrea, and Jadence.   Reports eating when she feels not so fatigued.  Drinking greater than 6 bottles of water a day.  Weight is stable.  She reports up-to-date on all vaccinations.    Past Medical History:   Diagnosis Date    Calculus of gallbladder without cholecystitis without obstruction     Encounter for blood transfusion     Flu 04/2019    sore throat    Sickle cell anemia       Past Surgical History:   Procedure Laterality Date    GALLBLADDER SURGERY  01/05/2024    MEDIPORT INSERTION, SINGLE      TUBAL LIGATION       Social History     Socioeconomic History    Marital status:    Tobacco Use    Smoking status: Never    Smokeless tobacco: Never   Substance and Sexual Activity    Alcohol use: No    Drug use: No    Sexual activity: Yes     Partners: Male      Family History   Problem Relation Name Age of Onset    Hypertension Mother        Review of patient's allergies indicates:   Allergen Reactions    Aspirin     Butorphanol     Compazine [prochlorperazine edisylate]     Ketorolac      Other reaction(s): other    Morpholine analogues     Nubain [nalbuphine]     Stadol [butorphanol tartrate]     Tramadol Rash     hives      CONSTITUTIONAL: no fevers, no chills, no weight loss, + fatigue, no weakness  HEMATOLOGIC: no abnormal bleeding, no abnormal bruising, no drenching night sweats  ONCOLOGIC: no new masses or lumps  HEENT: no vision loss, no tinnitus or hearing loss, no nose bleeding, no dysphagia, no odynophagia  CVS: no chest pain, no palpitations, no dyspnea on exertion  RESP: no shortness of breath, no hemoptysis, no cough  GI: + nausea, no vomiting, no diarrhea, no constipation, no melena, no hematochezia, no hematemesis, no abdominal pain, no increase in abdominal girth  : no dysuria, no hematuria, no hesitancy, no scrotal swelling, no  discharge  INTEGUMENT: no rashes, no abnormal bruising, no nail pitting, no hyperpigmentation  NEURO: no falls, no memory loss, no paresthesias or dysesthesias, no urofecal incontinence or retention, no loss of strength on any extremity  MSK: + muscle, bone, and joint pain all extremities and back.    PSYCH: no suicidal or homicidal ideation, no depression, no insomnia, no anhedonia  ENDOCRINE: no heat or cold intolerance, no polyuria, no polydipsia    Objective:        Vitals:    06/27/24 1114   BP: 128/79   Pulse: 107   Resp: 14   Temp: 98.3 °F (36.8 °C)                 ECOG PS 0  GA: AAOx3, NAD  HEENT:  Scleral icterus EOMI, good dentition, moist oral mucous membranes  LYMPH: no cervical, axillary or supraclavicular adenopathy  CVS: s1s2 RRR, no M/R/G, port in place without surrounding edema or erythema  RESP: CTA b/l, no crackles, no wheezes or rhonchi  ABD: soft, NT, ND, BS+, no hepatosplenomegaly  EXT: no deformities, no pedal edema  SKIN: no rashes, warm and dry  NEURO: normal mentation, strength 5/5 on all 4 extremities, no sensory deficits      LABS / IMAGING      6/26/2024 :  Creatinine 0.78, AST 29, and ALT 11, T. Bili 2.59, , ferritin 2460, folic acid 8.0, .18, Hgb 7.6, Hct 23.3, Plt 548,000.   3/4/2024:  Creatinine 0.88, Total Bili 2.2, WBC 13.55, Hgb 8.0, Hct 24.1, Platelets 534,000.  1/31/2024: Creatinine 0.83, Total Bili 2.0, , ferritin 2751, folic acid 8.23, WBC 12.39, Hgb 8.3, Hct 25.2, Platelets 512,000, Hgb electrophoresis Hgb F 5.7.   Other labs reviewed, Hg ranged 7-10, normal Cr  9/5/17 Hg A 9.3 Hg A2 3.8 Hg F 3.6 Hg S 83.3  6/3/2020 hgb 9.5, WBC 10.7, , MCV 96.7, RDW 18.4 Hg A 30.6 Hg F 5.2% Hg S 60.7%  8/10/20 Hg F 7.8% Hg A 10.8% Hg S 77.4% ferritin 2997 Cr 0.64 Hg 9 .3  ANC 7.9  10/5/20 Cr 0.63, Tbili 2.4, , Ferritin 2556, WBC 8.23, Hgb 8.9, .5, , retic 8.41, ANC 4.78, Urine Protein 11.56, Hgb A 0% A21 3.8% F 8.9% S  83.5%  11/23/20 Cr 0.69, TP 8.5, ALB 4.6, alk phos 77, T bili 2.2, , iron 151, TIBC 194, iron sat 78%, ferritin 2746, WBC 8.47, Hgb 9.2, , retic 26K, ANC 5.69, urine protein 40.21, Hgb F 10.2, Hgb S 83.8  1/27/21 Hg A 0% A2 3.8 F 11.3% S 82.5% Other 2.4% Cr 0.61 Alb 4.3 TP 7.8 Tbili 2.1 AST 19 ALT 10  Iron 128 Ferritin 2218 WBC 9.87 Hg 9.1 .2  ANC 6.6 Abs retic 125k urine protein 12.17 mg/dl  3/31/21 Cr 0.69, TP 8.1, T bili 2.2, , Ferritin 2166, Folic acid 19.68, WBC 8.23, Hgb 8.0, .3, , ANC 3.90, abs retic ~175K, urine protein neg, urine culture neg, Hgb A 0%, Hgb A2 4.1%, Hgb F 11.7%, Hgb S 80.5% other 3.7%  8/23/21 WBC 9.59, Hgb 7.9, .4, , Cr 0.74, Tbili 1.5, , Folic acid 10.48, Ferritin 1460, UA protein neg, Hgb A2 3.5% F 10.8% S 82.6%  10/15/21 Hg A2 3.2% Hg F 9.2% Hg S 84.5% Cr 0.67 Alb 4.5 Tbili 1.8  Ferritin 1850 Folate 11.45 WBC 10.47 Hg 8.5  Retic 192k UA protein neg  12/8/21 Hg A 18.1% A2 3.1% F 7.7% S 71.1%, , Ferritin 1654, Folic acid 13.44, WBC 10.39, Hgb 8.1, PLT 42, Retic 6.13%, Urine protein neg  2/3/22 Cr 0.75 Alb 4.7 TP 8.2 Tbili 2.9  Ferritin 1729 Folate 15.9 WBC 11.75 Hg 7.4 RBC 2.25 MCV 97.8 RDW 17.9  Retic 5.85% Urine protein neg Hg A 10.6% Hg F 8.3% Hg S 78.1% Hg A2 3%  4/25/22 Cr 0.77, Alb 4.5, TP 7.8, Tbili 1.8, , Ferritin 1584, folic acid 15, WBC 9.90, Hgb 8.2, , ANC 3.77, urine protein 10.47 mg/dL, hgb electrophoresis pending**  - 10/21/2022:  WBC 15.63, hemoglobin 7.4, hematocrit 21.1, platelet count 454K  - 12/01/2022: WBC 13.36, hemoglobin 7.5, hematocrit 22.6, platelet count 4 4 6.  MCV 95.8    -04/18/23: Chloride 109, cr 0.71, alb 4.5, ca 9.5, TB 2.4, , LFTs WNL, Ferritin 1457, uric acid 7.2, wbc 11.87, hgb 8.0, plt 508, ANC 4.51, retic 9.39,   -5/19/23 CMP WNL except bili 2.9, , ferritin 1467, folate 16, UA 8.1 vit D 4, , WBC 15.87, HGB 6.9, HCT  20,  , retic 9.46,   06/20/2023:  WBC count 10.3, hemoglobin 8.5, MCV 97.6, platelet count 5 110, ANC 5.9, creatinine 0.72, albumin 4.6, calcium 9.2, LFTs within normal limits, bilirubin 1.7, ferritin 1261, hemoglobin F was ordered but not done.  7/29/2023:  CMP:  abnormal only:  Cl 110, bilirubin 1.9.  , Ferritin 1449, Uric acid 7.5, Vit D 6 ng/mL,  CBC:  abnormal only:  WBC 13.87, Hgb 7.7, Hct 22.1, MCV 98.7, RDW-CV 16.6, platelets 387,000, retic 8.54, Lymph 6.44, mono 1.18.   8/30/2023:  CMP:  Cl- 109, BUN 6.97, creatinine 0.78,  Total Bili 2.1, , Ferritin 1575.  CBC:  WBC 11.66, Hgb 7.9, Hct 23.5, platelets 428,000, Retic 8.23.  8/23/2023: Hgb F 6.1.  9/26/2023:  Cl- 111, Creatinine 0.84, Total Bili 2.4, , Ferritin 2215, WBC 16.06, Hgb 7.0, Hct 21.0, Plt 545,000, retic 4.18, Hgb F 5.4.  12/06/2023:  Creatinine 0.80, Total Bili 2.9, AST and ALT WNL, , ferritin 1637, folic acid 9.47, WBC 12.65, Hgb 7.6, Hct 22.4, Plt 413,000.  No protein electrophoresis.    1/2/2024:  Creatinine 0.93, Total Bili 1.6, , ferritin 1489, folic acid 5.02, WBC 15.48, Hgb 7.0, Hct 21.4, Platelets 926,000, retic 10.94.     Imaging:  reviewed, X-Rays of b/l hips 11/2019 no AVN, CXR unremarkable    Assessment:   1. Sickle cell disease D57.1 C/w hydrea 1500mg daily, Hg F not at goal on most recent lab work, Hgb electrophoresis obtained today still pending  C/w folic acid 1mg daily  Adequate PO hydration  Analgesia with fentanyl and percocet 10mg as needed. C/w fentanyl 100mcg/hr,   Avoid overtransfusion. Only transfuse if symptomatic anemia. Do not transfuse for simple pain crisis  Of note, she did receive 3 days of Desferal and was transfused 1 unit PRBC during hospitalization at [facility] 11/2021.  Desferal x 10 days planned for 3/2022 but patient decided against Desferal, will continue to monitor ferritin closely and will Obtain hg electrophoresis, ferritin, iron, tibc, CBC w/ diff, CMP, folate, retic,  UA for urine protein 1 week prior to return visit  Unknown prior vaccinations. She believes she last got PNA vaccinations ~5 years ago.  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        Plan:       - Reinforced medication compliance, good oral hydration, diet, and rest for current pain crisis.  - Continue folic acid 1 mg daily,  - Continue hydroxyurea 1500 daily  - Continue Jadenu 1080 daily  - C/w antinausea meds  - Continue with Periactin for appetite  - Continue with pain medication for all pain crisis.    - Refilled pain medication today after checking LA .  -- Repeat labs on follow up including hemoglobin electrophoresis- RTC 4 WEEKS.  -  Discussed more frequent sickle cell crisis, weakness, and fatigue, along with frequent PRBC infusions.  She has agreed to go to the [facility] in [place] for consultation.  6/27/2024:  Waiting on appointment.  Needs ophthalmology follow-up, highly encouraged today.  Needs to contact Medicaid regarding PCP to manage wellness visits and vaccinations.    The patient is in agreement with today's plan of care.  All questions answered.  Patient is aware to contact our office for any problems or concerns prior to next visit.      Vivian Hankins, MSN-ED, APRN, AGACNP-BC, OCN

## 2024-07-24 DIAGNOSIS — D57.00 HB-SS DISEASE WITH CRISIS: ICD-10-CM

## 2024-07-24 DIAGNOSIS — D57.00 SICKLE CELL PAIN CRISIS: ICD-10-CM

## 2024-07-24 RX ORDER — OXYCODONE AND ACETAMINOPHEN 10; 325 MG/1; MG/1
1 TABLET ORAL EVERY 6 HOURS PRN
Qty: 120 TABLET | Refills: 0 | Status: SHIPPED | OUTPATIENT
Start: 2024-07-27 | End: 2025-07-27

## 2024-07-24 RX ORDER — FENTANYL 100 UG/H
PATCH TRANSDERMAL
Qty: 10 PATCH | Refills: 0 | Status: SHIPPED | OUTPATIENT
Start: 2024-07-27

## 2024-07-25 NOTE — PROGRESS NOTES
Diagnosis:  - SCD    Treatment History:  - Folic acid, Hydrea, Jadenu    Current Treatment:   - hydrea 1500 mg daily  - Jadenu 1080 mg daily  - Folic acid 1 mg daily        Subjective:       HPI  Anya Matt Leal  34 y.o.  female with past medical history significant for SCD here for follow-up  According to the patient she had been on folic acid, hydroxyurea and jadenu in the past.  Patient had not been taking her HU and Jadenu recently.  Patient had multiple transfusions over the year, requiring almost 10 transfusion last year.  Denies any history of exchange transfusion or acute chest syndrome.  Denies any history of avascular necrosis.  Denies any history of stroke. No ACS ever in her life never intubated Never full exchange transfusions >20 lifetime transfusions, in 2019 pt reports ~10 units of PRBC overall  No AVN  Never told she has iron overload and has never been on iron chelation prior to initial presentation  No strokes  Not on any pediatric transfusion regimen  >10 pain crisis per year (occur almost with every cycle requiring frequent hospital visits)  hospitalization 2/2021, had partial exchange of 1 unit  October 2023 reports pain crisis the last week of October, which involved a hospital admission for 2 days along with 2 units of PRBCs, IV pain medication, and IVFs.  May 11-20, 2024:  Sickle cell vaso-occlusive pain crisis with 2 units of blood, IVF, and pain medication.    Interval History:   6/27/2024:  Ms. 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 She reports noncompliance with Folic acid, Hydrea, and Jadenu. Has not taken medication since 2/24.  Refilled medication today and reinforce importance of compliance with medications in order to avoid crises. She reports diffuse pain. She denies, chills, headaches, dizziness, cough, SOB, abnormal bleeding, rash, nausea, weight loss, abdominal pain, flank pain, change in bowel or bladder habits, no lower  extremity pain or swelling, no leg ulcers, and no neuropathy.  Reports eating when she feels not so fatigued.  Drinking greater than 6 bottles of water a day.  Weight is stable.  She reports up-to-date on all vaccinations.      Past Medical History:   Diagnosis Date    Calculus of gallbladder without cholecystitis without obstruction     Encounter for blood transfusion     Flu 04/2019    sore throat    Sickle cell anemia       Past Surgical History:   Procedure Laterality Date    GALLBLADDER SURGERY  01/05/2024    MEDIPORT INSERTION, SINGLE      TUBAL LIGATION       Social History     Socioeconomic History    Marital status:    Tobacco Use    Smoking status: Never    Smokeless tobacco: Never   Substance and Sexual Activity    Alcohol use: No    Drug use: No    Sexual activity: Yes     Partners: Male      Family History   Problem Relation Name Age of Onset    Hypertension Mother        Review of patient's allergies indicates:   Allergen Reactions    Aspirin     Butorphanol     Compazine [prochlorperazine edisylate]     Ketorolac      Other reaction(s): other    Morpholine analogues     Nubain [nalbuphine]     Stadol [butorphanol tartrate]     Tramadol Rash     hives      CONSTITUTIONAL: no fevers, no chills, no weight loss, + fatigue, no weakness  HEMATOLOGIC: no abnormal bleeding, no abnormal bruising, no drenching night sweats  ONCOLOGIC: no new masses or lumps  HEENT: no vision loss, no tinnitus or hearing loss, no nose bleeding, no dysphagia, no odynophagia  CVS: no chest pain, no palpitations, no dyspnea on exertion  RESP: no shortness of breath, no hemoptysis, no cough  GI: + nausea, no vomiting, no diarrhea, no constipation, no melena, no hematochezia, no hematemesis, no abdominal pain, no increase in abdominal girth  : no dysuria, no hematuria, no hesitancy, no scrotal swelling, no discharge  INTEGUMENT: no rashes, no abnormal bruising, no nail pitting, no hyperpigmentation  NEURO: no falls, no  memory loss, no paresthesias or dysesthesias, no urofecal incontinence or retention, no loss of strength on any extremity  MSK: + muscle, bone, and joint pain all extremities and back.    PSYCH: no suicidal or homicidal ideation, no depression, no insomnia, no anhedonia  ENDOCRINE: no heat or cold intolerance, no polyuria, no polydipsia    Objective:        Vitals:    07/29/24 1132   BP: 126/87   Pulse: 110   Resp: 18   Temp: 98.4 °F (36.9 °C)       ECOG PS 0  GA: AAOx3, NAD  HEENT:  Scleral icterus EOMI, good dentition, moist oral mucous membranes  LYMPH: no cervical, axillary or supraclavicular adenopathy  CVS: s1s2 RRR, no M/R/G, port in place without surrounding edema or erythema  RESP: CTA b/l, no crackles, no wheezes or rhonchi  ABD: soft, NT, ND, BS+, no hepatosplenomegaly  EXT: no deformities, no pedal edema  SKIN: no rashes, warm and dry  NEURO: normal mentation, strength 5/5 on all 4 extremities, no sensory deficits      LABS / IMAGING    7/26/2024 :  Creatinine 1.21, AST 30, and ALT 9, T. Bili 3.0, , ferritin 2761, folic acid 6.98, WBC 20.15, Hgb 7.7, Hct 22.7, Plt 522,000.     6/26/2024 :  Creatinine 0.78, AST 29, and ALT 11, T. Bili 2.59, , ferritin 2460, folic acid 8.0, WBC 14.18, Hgb 7.6, Hct 23.3, Plt 548,000.     3/4/2024:  Creatinine 0.88, Total Bili 2.2, WBC 13.55, Hgb 8.0, Hct 24.1, Platelets 534,000.    1/31/2024: Creatinine 0.83, Total Bili 2.0, , ferritin 2751, folic acid 8.23, WBC 12.39, Hgb 8.3, Hct 25.2, Platelets 512,000, Hgb electrophoresis Hgb F 5.7.   Other labs reviewed, Hg ranged 7-10, normal Cr  9/5/17 Hg A 9.3 Hg A2 3.8 Hg F 3.6 Hg S 83.3  6/3/2020 hgb 9.5, WBC 10.7, , MCV 96.7, RDW 18.4 Hg A 30.6 Hg F 5.2% Hg S 60.7%  8/10/20 Hg F 7.8% Hg A 10.8% Hg S 77.4% ferritin 2997 Cr 0.64 Hg 9 .3  ANC 7.9  10/5/20 Cr 0.63, Tbili 2.4, , Ferritin 2556, WBC 8.23, Hgb 8.9, .5, , retic 8.41, ANC 4.78, Urine Protein 11.56, Hgb A 0% A21  3.8% F 8.9% S 83.5%  11/23/20 Cr 0.69, TP 8.5, ALB 4.6, alk phos 77, T bili 2.2, , iron 151, TIBC 194, iron sat 78%, ferritin 2746, WBC 8.47, Hgb 9.2, , retic 26K, ANC 5.69, urine protein 40.21, Hgb F 10.2, Hgb S 83.8  1/27/21 Hg A 0% A2 3.8 F 11.3% S 82.5% Other 2.4% Cr 0.61 Alb 4.3 TP 7.8 Tbili 2.1 AST 19 ALT 10  Iron 128 Ferritin 2218 WBC 9.87 Hg 9.1 .2  ANC 6.6 Abs retic 125k urine protein 12.17 mg/dl  3/31/21 Cr 0.69, TP 8.1, T bili 2.2, , Ferritin 2166, Folic acid 19.68, WBC 8.23, Hgb 8.0, .3, , ANC 3.90, abs retic ~175K, urine protein neg, urine culture neg, Hgb A 0%, Hgb A2 4.1%, Hgb F 11.7%, Hgb S 80.5% other 3.7%  8/23/21 WBC 9.59, Hgb 7.9, .4, , Cr 0.74, Tbili 1.5, , Folic acid 10.48, Ferritin 1460, UA protein neg, Hgb A2 3.5% F 10.8% S 82.6%  10/15/21 Hg A2 3.2% Hg F 9.2% Hg S 84.5% Cr 0.67 Alb 4.5 Tbili 1.8  Ferritin 1850 Folate 11.45 WBC 10.47 Hg 8.5  Retic 192k UA protein neg  12/8/21 Hg A 18.1% A2 3.1% F 7.7% S 71.1%, , Ferritin 1654, Folic acid 13.44, WBC 10.39, Hgb 8.1, PLT 42, Retic 6.13%, Urine protein neg  2/3/22 Cr 0.75 Alb 4.7 TP 8.2 Tbili 2.9  Ferritin 1729 Folate 15.9 WBC 11.75 Hg 7.4 RBC 2.25 MCV 97.8 RDW 17.9  Retic 5.85% Urine protein neg Hg A 10.6% Hg F 8.3% Hg S 78.1% Hg A2 3%  4/25/22 Cr 0.77, Alb 4.5, TP 7.8, Tbili 1.8, , Ferritin 1584, folic acid 15, WBC 9.90, Hgb 8.2, , ANC 3.77, urine protein 10.47 mg/dL, hgb electrophoresis pending**  - 10/21/2022:  WBC 15.63, hemoglobin 7.4, hematocrit 21.1, platelet count 454K  - 12/01/2022: WBC 13.36, hemoglobin 7.5, hematocrit 22.6, platelet count 4 4 6.  MCV 95.8    -04/18/23: Chloride 109, cr 0.71, alb 4.5, ca 9.5, TB 2.4, , LFTs WNL, Ferritin 1457, uric acid 7.2, wbc 11.87, hgb 8.0, plt 508, ANC 4.51, retic 9.39,   -5/19/23 CMP WNL except bili 2.9, , ferritin 1467, folate 16, UA 8.1 vit D 4, , WBC  15.87, HGB 6.9, HCT 20,  , retic 9.46,   06/20/2023:  WBC count 10.3, hemoglobin 8.5, MCV 97.6, platelet count 5 110, ANC 5.9, creatinine 0.72, albumin 4.6, calcium 9.2, LFTs within normal limits, bilirubin 1.7, ferritin 1261, hemoglobin F was ordered but not done.  7/29/2023:  CMP:  abnormal only:  Cl 110, bilirubin 1.9.  , Ferritin 1449, Uric acid 7.5, Vit D 6 ng/mL,  CBC:  abnormal only:  WBC 13.87, Hgb 7.7, Hct 22.1, MCV 98.7, RDW-CV 16.6, platelets 387,000, retic 8.54, Lymph 6.44, mono 1.18.   8/30/2023:  CMP:  Cl- 109, BUN 6.97, creatinine 0.78,  Total Bili 2.1, , Ferritin 1575.  CBC:  WBC 11.66, Hgb 7.9, Hct 23.5, platelets 428,000, Retic 8.23.  8/23/2023: Hgb F 6.1.  9/26/2023:  Cl- 111, Creatinine 0.84, Total Bili 2.4, , Ferritin 2215, WBC 16.06, Hgb 7.0, Hct 21.0, Plt 545,000, retic 4.18, Hgb F 5.4.  12/06/2023:  Creatinine 0.80, Total Bili 2.9, AST and ALT WNL, , ferritin 1637, folic acid 9.47, WBC 12.65, Hgb 7.6, Hct 22.4, Plt 413,000.  No protein electrophoresis.    1/2/2024:  Creatinine 0.93, Total Bili 1.6, , ferritin 1489, folic acid 5.02, WBC 15.48, Hgb 7.0, Hct 21.4, Platelets 926,000, retic 10.94.     Imaging:  reviewed, X-Rays of b/l hips 11/2019 no AVN, CXR unremarkable    Assessment:   1. Sickle cell disease D57.1 C/w hydrea 1500mg daily, Hg F not at goal on most recent lab work, Hgb electrophoresis obtained today still pending  C/w folic acid 1mg daily  Adequate PO hydration  Analgesia with fentanyl and percocet 10mg as needed. C/w fentanyl 100mcg/hr,   Avoid overtransfusion. Only transfuse if symptomatic anemia. Do not transfuse for simple pain crisis  Of note, she did receive 3 days of Desferal and was transfused 1 unit PRBC during hospitalization at [facility] 11/2021.  Desferal x 10 days planned for 3/2022 but patient decided against Desferal, will continue to monitor ferritin closely and will Obtain hg electrophoresis, ferritin, iron, tibc, CBC w/ diff,  CMP, folate, retic, UA for urine protein 1 week prior to return visit  Unknown prior vaccinations. She believes she last got PNA vaccinations ~5 years ago.  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        Plan:   7/29/24  - Reinforced medication compliance, good oral hydration, diet, and rest for current pain crisis.  - restart folic acid 1 mg daily,  - restart hydroxyurea 1500 daily  - restart Jadenu 1080 daily  - C/w antinausea meds  - Continue with Periactin for appetite  - Continue with pain medication for all pain crisis.    -- Repeat labs on follow up including hemoglobin electrophoresis- RTC 4 WEEKS.  Does not want to go to  Clinic in [place] at this time.  Needs ophthalmology follow-up, highly encouraged today.    Vicki Spencer FNP-C

## 2024-07-29 ENCOUNTER — OFFICE VISIT (OUTPATIENT)
Dept: HEMATOLOGY/ONCOLOGY | Facility: CLINIC | Age: 34
End: 2024-07-29
Payer: MEDICAID

## 2024-07-29 VITALS
SYSTOLIC BLOOD PRESSURE: 126 MMHG | HEIGHT: 65 IN | DIASTOLIC BLOOD PRESSURE: 87 MMHG | WEIGHT: 170 LBS | TEMPERATURE: 98 F | RESPIRATION RATE: 18 BRPM | BODY MASS INDEX: 28.32 KG/M2 | OXYGEN SATURATION: 97 % | HEART RATE: 110 BPM

## 2024-07-29 DIAGNOSIS — D57.00 SICKLE CELL PAIN CRISIS: ICD-10-CM

## 2024-07-29 DIAGNOSIS — E83.111 IRON OVERLOAD DUE TO REPEATED RED BLOOD CELL TRANSFUSIONS: ICD-10-CM

## 2024-07-29 DIAGNOSIS — D57.00 HB-SS DISEASE WITH CRISIS: ICD-10-CM

## 2024-07-29 DIAGNOSIS — R63.0 DECREASED APPETITE: ICD-10-CM

## 2024-07-29 PROCEDURE — 3079F DIAST BP 80-89 MM HG: CPT | Mod: CPTII,,,

## 2024-07-29 PROCEDURE — 1159F MED LIST DOCD IN RCRD: CPT | Mod: CPTII,,,

## 2024-07-29 PROCEDURE — 1160F RVW MEDS BY RX/DR IN RCRD: CPT | Mod: CPTII,,,

## 2024-07-29 PROCEDURE — 3074F SYST BP LT 130 MM HG: CPT | Mod: CPTII,,,

## 2024-07-29 PROCEDURE — 3008F BODY MASS INDEX DOCD: CPT | Mod: CPTII,,,

## 2024-07-29 PROCEDURE — 99214 OFFICE O/P EST MOD 30 MIN: CPT | Mod: ,,,

## 2024-07-29 RX ORDER — CYPROHEPTADINE HYDROCHLORIDE 4 MG/1
4 TABLET ORAL 3 TIMES DAILY PRN
Qty: 90 TABLET | Refills: 1 | Status: SHIPPED | OUTPATIENT
Start: 2024-07-29

## 2024-07-29 RX ORDER — DEFERASIROX 360 MG/1
1080 TABLET, FILM COATED ORAL DAILY
Qty: 90 TABLET | Refills: 3 | Status: SHIPPED | OUTPATIENT
Start: 2024-07-29

## 2024-07-29 RX ORDER — HYDROXYUREA 500 MG/1
1500 CAPSULE ORAL DAILY
Qty: 90 CAPSULE | Refills: 5 | Status: SHIPPED | OUTPATIENT
Start: 2024-07-29

## 2024-07-29 RX ORDER — FOLIC ACID 1 MG/1
1 TABLET ORAL DAILY
Qty: 90 TABLET | Refills: 3 | Status: SHIPPED | OUTPATIENT
Start: 2024-07-29 | End: 2025-07-29

## 2024-08-23 DIAGNOSIS — D57.00 SICKLE CELL PAIN CRISIS: ICD-10-CM

## 2024-08-23 DIAGNOSIS — D57.00 HB-SS DISEASE WITH CRISIS: ICD-10-CM

## 2024-08-23 RX ORDER — OXYCODONE AND ACETAMINOPHEN 10; 325 MG/1; MG/1
1 TABLET ORAL EVERY 6 HOURS PRN
Qty: 120 TABLET | Refills: 0 | Status: SHIPPED | OUTPATIENT
Start: 2024-08-25 | End: 2025-08-25

## 2024-08-23 RX ORDER — OXYCODONE AND ACETAMINOPHEN 10; 325 MG/1; MG/1
1 TABLET ORAL EVERY 6 HOURS PRN
Qty: 120 TABLET | Refills: 0 | Status: CANCELLED | OUTPATIENT
Start: 2024-08-23 | End: 2025-08-23

## 2024-08-23 RX ORDER — FENTANYL 100 UG/H
PATCH TRANSDERMAL
Qty: 10 PATCH | Refills: 0 | Status: SHIPPED | OUTPATIENT
Start: 2024-08-25

## 2024-08-23 RX ORDER — FENTANYL 100 UG/H
PATCH TRANSDERMAL
Qty: 10 PATCH | Refills: 0 | Status: CANCELLED | OUTPATIENT
Start: 2024-08-23

## 2024-09-04 ENCOUNTER — TELEPHONE (OUTPATIENT)
Dept: HEMATOLOGY/ONCOLOGY | Facility: CLINIC | Age: 34
End: 2024-09-04

## 2024-09-04 NOTE — TELEPHONE ENCOUNTER
Murali with [facility] reports Hgb 6.8, HCT 19.9. Spoke with Pt c/o fever, weakness, SOB, and sore throat. Vicki Spencer, NP notified. Instructed Pt to go ED for evaluation. Verbalizes understanding.--SC, LPN

## 2024-09-23 ENCOUNTER — OFFICE VISIT (OUTPATIENT)
Dept: HEMATOLOGY/ONCOLOGY | Facility: CLINIC | Age: 34
End: 2024-09-23
Payer: MEDICAID

## 2024-09-23 VITALS
DIASTOLIC BLOOD PRESSURE: 81 MMHG | WEIGHT: 162 LBS | TEMPERATURE: 99 F | BODY MASS INDEX: 26.99 KG/M2 | HEART RATE: 105 BPM | RESPIRATION RATE: 18 BRPM | OXYGEN SATURATION: 96 % | SYSTOLIC BLOOD PRESSURE: 129 MMHG | HEIGHT: 65 IN

## 2024-09-23 DIAGNOSIS — D57.00 HB-SS DISEASE WITH CRISIS: Primary | ICD-10-CM

## 2024-09-23 DIAGNOSIS — J06.9 UPPER RESPIRATORY INFECTION WITH COUGH AND CONGESTION: ICD-10-CM

## 2024-09-23 DIAGNOSIS — D57.00 SICKLE CELL PAIN CRISIS: ICD-10-CM

## 2024-09-23 DIAGNOSIS — E83.111 IRON OVERLOAD DUE TO REPEATED RED BLOOD CELL TRANSFUSIONS: ICD-10-CM

## 2024-09-23 PROCEDURE — 1159F MED LIST DOCD IN RCRD: CPT | Mod: CPTII,,,

## 2024-09-23 PROCEDURE — 3008F BODY MASS INDEX DOCD: CPT | Mod: CPTII,,,

## 2024-09-23 PROCEDURE — 3074F SYST BP LT 130 MM HG: CPT | Mod: CPTII,,,

## 2024-09-23 PROCEDURE — 99214 OFFICE O/P EST MOD 30 MIN: CPT | Mod: ,,,

## 2024-09-23 PROCEDURE — 1160F RVW MEDS BY RX/DR IN RCRD: CPT | Mod: CPTII,,,

## 2024-09-23 PROCEDURE — 3079F DIAST BP 80-89 MM HG: CPT | Mod: CPTII,,,

## 2024-09-23 RX ORDER — FLUTICASONE PROPIONATE 50 MCG
1 SPRAY, SUSPENSION (ML) NASAL DAILY
Qty: 9.9 ML | Refills: 2 | Status: SHIPPED | OUTPATIENT
Start: 2024-09-23

## 2024-09-23 RX ORDER — HYDROXYUREA 500 MG/1
1500 CAPSULE ORAL DAILY
Qty: 90 CAPSULE | Refills: 5 | Status: SHIPPED | OUTPATIENT
Start: 2024-09-23

## 2024-09-23 RX ORDER — OXYCODONE AND ACETAMINOPHEN 10; 325 MG/1; MG/1
1 TABLET ORAL EVERY 6 HOURS PRN
Qty: 120 TABLET | Refills: 0 | Status: SHIPPED | OUTPATIENT
Start: 2024-09-23 | End: 2025-09-23

## 2024-09-23 RX ORDER — DEFERASIROX 360 MG/1
1080 TABLET, FILM COATED ORAL DAILY
Qty: 90 TABLET | Refills: 3 | Status: SHIPPED | OUTPATIENT
Start: 2024-09-23

## 2024-09-23 RX ORDER — AZITHROMYCIN 250 MG/1
TABLET, FILM COATED ORAL
Qty: 6 TABLET | Refills: 0 | Status: SHIPPED | OUTPATIENT
Start: 2024-09-23 | End: 2024-09-28

## 2024-09-23 RX ORDER — FOLIC ACID 1 MG/1
1 TABLET ORAL DAILY
Qty: 90 TABLET | Refills: 3 | Status: SHIPPED | OUTPATIENT
Start: 2024-09-23 | End: 2025-09-23

## 2024-09-23 RX ORDER — FENTANYL 100 UG/H
PATCH TRANSDERMAL
Qty: 10 PATCH | Refills: 0 | Status: SHIPPED | OUTPATIENT
Start: 2024-09-23

## 2024-09-23 NOTE — PROGRESS NOTES
Diagnosis:  - SCD    Treatment History:  - Folic acid, Hydrea, Jadenu    Current Treatment:   - hydrea 1500 mg daily  - Jadenu 1080 mg daily  - Folic acid 1 mg daily        Subjective:       HPI  Anya Matt Leal  34 y.o.  female with past medical history significant for SCD here for follow-up  According to the patient she had been on folic acid, hydroxyurea and jadenu in the past.  Patient had not been taking her HU and Jadenu recently.  Patient had multiple transfusions over the year, requiring almost 10 transfusion last year.  Denies any history of exchange transfusion or acute chest syndrome.  Denies any history of avascular necrosis.  Denies any history of stroke. No ACS ever in her life never intubated Never full exchange transfusions >20 lifetime transfusions, in 2019 pt reports ~10 units of PRBC overall  No AVN  Never told she has iron overload and has never been on iron chelation prior to initial presentation  No strokes  Not on any pediatric transfusion regimen  >10 pain crisis per year (occur almost with every cycle requiring frequent hospital visits)  hospitalization 2/2021, had partial exchange of 1 unit  October 2023 reports pain crisis the last week of October, which involved a hospital admission for 2 days along with 2 units of PRBCs, IV pain medication, and IVFs.  May 11-20, 2024:  Sickle cell vaso-occlusive pain crisis with 2 units of blood, IVF, and pain medication.    Interval History:   9/23/2024:  Ms. 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 She reports compliance with Folic acid, Hydrea, and Jadenu.   Refilled medication today and reinforce importance of compliance with medications in order to avoid crises. She reports pain. She denies, chills, headaches, dizziness, cough, SOB, abnormal bleeding, rash, nausea, weight loss, abdominal pain, flank pain, change in bowel or bladder habits, no lower extremity pain or swelling, no leg ulcers, and no  neuropathy.  Reports eating when she feels not so fatigued.  Drinking greater than 6 bottles of water a day.  Weight is stable.  She reports up-to-date on all vaccinations. Reports pain in her sinuses and cough that is lingering. Recently covid negative, will order zpack and fluticasone today.    Past Medical History:   Diagnosis Date    Calculus of gallbladder without cholecystitis without obstruction     Encounter for blood transfusion     Flu 04/2019    sore throat    Sickle cell anemia       Past Surgical History:   Procedure Laterality Date    GALLBLADDER SURGERY  01/05/2024    MEDIPORT INSERTION, SINGLE      TUBAL LIGATION       Social History     Socioeconomic History    Marital status:    Tobacco Use    Smoking status: Never    Smokeless tobacco: Never   Substance and Sexual Activity    Alcohol use: No    Drug use: No    Sexual activity: Yes     Partners: Male      Family History   Problem Relation Name Age of Onset    Hypertension Mother        Review of patient's allergies indicates:   Allergen Reactions    Aspirin     Butorphanol     Compazine [prochlorperazine edisylate]     Ketorolac      Other reaction(s): other    Morpholine analogues     Nubain [nalbuphine]     Stadol [butorphanol tartrate]     Tramadol Rash     hives      CONSTITUTIONAL: no fevers, no chills, no weight loss, + fatigue, no weakness  HEMATOLOGIC: no abnormal bleeding, no abnormal bruising, no drenching night sweats  ONCOLOGIC: no new masses or lumps  HEENT: no vision loss, no tinnitus or hearing loss, no nose bleeding, no dysphagia, no odynophagia  CVS: no chest pain, no palpitations, no dyspnea on exertion  RESP: no shortness of breath, no hemoptysis, no cough  GI: + nausea, no vomiting, no diarrhea, no constipation, no melena, no hematochezia, no hematemesis, no abdominal pain, no increase in abdominal girth  : no dysuria, no hematuria, no hesitancy, no scrotal swelling, no discharge  INTEGUMENT: no rashes, no abnormal  bruising, no nail pitting, no hyperpigmentation  NEURO: no falls, no memory loss, no paresthesias or dysesthesias, no urofecal incontinence or retention, no loss of strength on any extremity  MSK: + muscle, bone, and joint pain all extremities and back.    PSYCH: no suicidal or homicidal ideation, no depression, no insomnia, no anhedonia  ENDOCRINE: no heat or cold intolerance, no polyuria, no polydipsia    Objective:        Vitals:    09/23/24 1009   BP: 129/81   Pulse: 105   Resp: 18   Temp: 98.5 °F (36.9 °C)         ECOG PS 0  GA: AAOx3, NAD  HEENT:  Scleral icterus EOMI, good dentition, moist oral mucous membranes  LYMPH: no cervical, axillary or supraclavicular adenopathy  CVS: s1s2 RRR, no M/R/G, port in place without surrounding edema or erythema  RESP: CTA b/l, no crackles, no wheezes or rhonchi  ABD: soft, NT, ND, BS+, no hepatosplenomegaly  EXT: no deformities, no pedal edema  SKIN: no rashes, warm and dry  NEURO: normal mentation, strength 5/5 on all 4 extremities, no sensory deficits      LABS / IMAGING    9/3/2024 :  Creatinine 0.92, AST 33, and ALT 15, T. Bili 1.6, ferritin 2096, folic acid 16.75, WBC 10.31, Hgb 6.8, Hct 19.9, Plt 388,000. Hgb electrophoresis Hgb F 6.6    7/26/2024 :  Creatinine 1.21, AST 30, and ALT 9, T. Bili 3.0, , ferritin 2761, folic acid 6.98, WBC 20.15, Hgb 7.7, Hct 22.7, Plt 522,000.     6/26/2024 :  Creatinine 0.78, AST 29, and ALT 11, T. Bili 2.59, , ferritin 2460, folic acid 8.0, WBC 14.18, Hgb 7.6, Hct 23.3, Plt 548,000.     3/4/2024:  Creatinine 0.88, Total Bili 2.2, WBC 13.55, Hgb 8.0, Hct 24.1, Platelets 534,000.    1/31/2024: Creatinine 0.83, Total Bili 2.0, , ferritin 2751, folic acid 8.23, WBC 12.39, Hgb 8.3, Hct 25.2, Platelets 512,000, Hgb electrophoresis Hgb F 5.7.   Other labs reviewed, Hg ranged 7-10, normal Cr  9/5/17 Hg A 9.3 Hg A2 3.8 Hg F 3.6 Hg S 83.3  6/3/2020 hgb 9.5, WBC 10.7, , MCV 96.7, RDW 18.4 Hg A 30.6 Hg F 5.2% Hg S  60.7%  8/10/20 Hg F 7.8% Hg A 10.8% Hg S 77.4% ferritin 2997 Cr 0.64 Hg 9 .3  ANC 7.9  10/5/20 Cr 0.63, Tbili 2.4, , Ferritin 2556, WBC 8.23, Hgb 8.9, .5, , retic 8.41, ANC 4.78, Urine Protein 11.56, Hgb A 0% A21 3.8% F 8.9% S 83.5%  11/23/20 Cr 0.69, TP 8.5, ALB 4.6, alk phos 77, T bili 2.2, , iron 151, TIBC 194, iron sat 78%, ferritin 2746, WBC 8.47, Hgb 9.2, , retic 26K, ANC 5.69, urine protein 40.21, Hgb F 10.2, Hgb S 83.8  1/27/21 Hg A 0% A2 3.8 F 11.3% S 82.5% Other 2.4% Cr 0.61 Alb 4.3 TP 7.8 Tbili 2.1 AST 19 ALT 10  Iron 128 Ferritin 2218 WBC 9.87 Hg 9.1 .2  ANC 6.6 Abs retic 125k urine protein 12.17 mg/dl  3/31/21 Cr 0.69, TP 8.1, T bili 2.2, , Ferritin 2166, Folic acid 19.68, WBC 8.23, Hgb 8.0, .3, , ANC 3.90, abs retic ~175K, urine protein neg, urine culture neg, Hgb A 0%, Hgb A2 4.1%, Hgb F 11.7%, Hgb S 80.5% other 3.7%  8/23/21 WBC 9.59, Hgb 7.9, .4, , Cr 0.74, Tbili 1.5, , Folic acid 10.48, Ferritin 1460, UA protein neg, Hgb A2 3.5% F 10.8% S 82.6%  10/15/21 Hg A2 3.2% Hg F 9.2% Hg S 84.5% Cr 0.67 Alb 4.5 Tbili 1.8  Ferritin 1850 Folate 11.45 WBC 10.47 Hg 8.5  Retic 192k UA protein neg  12/8/21 Hg A 18.1% A2 3.1% F 7.7% S 71.1%, , Ferritin 1654, Folic acid 13.44, WBC 10.39, Hgb 8.1, PLT 42, Retic 6.13%, Urine protein neg  2/3/22 Cr 0.75 Alb 4.7 TP 8.2 Tbili 2.9  Ferritin 1729 Folate 15.9 WBC 11.75 Hg 7.4 RBC 2.25 MCV 97.8 RDW 17.9  Retic 5.85% Urine protein neg Hg A 10.6% Hg F 8.3% Hg S 78.1% Hg A2 3%  4/25/22 Cr 0.77, Alb 4.5, TP 7.8, Tbili 1.8, , Ferritin 1584, folic acid 15, WBC 9.90, Hgb 8.2, , ANC 3.77, urine protein 10.47 mg/dL, hgb electrophoresis pending**  - 10/21/2022:  WBC 15.63, hemoglobin 7.4, hematocrit 21.1, platelet count 454K  - 12/01/2022: WBC 13.36, hemoglobin 7.5, hematocrit 22.6, platelet count 4 4 6.  MCV  95.8    -04/18/23: Chloride 109, cr 0.71, alb 4.5, ca 9.5, TB 2.4, , LFTs WNL, Ferritin 1457, uric acid 7.2, wbc 11.87, hgb 8.0, plt 508, ANC 4.51, retic 9.39,   -5/19/23 CMP WNL except bili 2.9, , ferritin 1467, folate 16, UA 8.1 vit D 4, , WBC 15.87, HGB 6.9, HCT 20,  , retic 9.46,   06/20/2023:  WBC count 10.3, hemoglobin 8.5, MCV 97.6, platelet count 5 110, ANC 5.9, creatinine 0.72, albumin 4.6, calcium 9.2, LFTs within normal limits, bilirubin 1.7, ferritin 1261, hemoglobin F was ordered but not done.  7/29/2023:  CMP:  abnormal only:  Cl 110, bilirubin 1.9.  , Ferritin 1449, Uric acid 7.5, Vit D 6 ng/mL,  CBC:  abnormal only:  WBC 13.87, Hgb 7.7, Hct 22.1, MCV 98.7, RDW-CV 16.6, platelets 387,000, retic 8.54, Lymph 6.44, mono 1.18.   8/30/2023:  CMP:  Cl- 109, BUN 6.97, creatinine 0.78,  Total Bili 2.1, , Ferritin 1575.  CBC:  WBC 11.66, Hgb 7.9, Hct 23.5, platelets 428,000, Retic 8.23.  8/23/2023: Hgb F 6.1.  9/26/2023:  Cl- 111, Creatinine 0.84, Total Bili 2.4, , Ferritin 2215, WBC 16.06, Hgb 7.0, Hct 21.0, Plt 545,000, retic 4.18, Hgb F 5.4.  12/06/2023:  Creatinine 0.80, Total Bili 2.9, AST and ALT WNL, , ferritin 1637, folic acid 9.47, WBC 12.65, Hgb 7.6, Hct 22.4, Plt 413,000.  No protein electrophoresis.    1/2/2024:  Creatinine 0.93, Total Bili 1.6, , ferritin 1489, folic acid 5.02, WBC 15.48, Hgb 7.0, Hct 21.4, Platelets 926,000, retic 10.94.     Imaging:  reviewed, X-Rays of b/l hips 11/2019 no AVN, CXR unremarkable    Assessment:   1. Sickle cell disease D57.1 C/w hydrea 1500mg daily, Hg F not at goal on most recent lab work, Hgb electrophoresis obtained today still pending  C/w folic acid 1mg daily  Adequate PO hydration  Analgesia with fentanyl and percocet 10mg as needed. C/w fentanyl 100mcg/hr,   Avoid overtransfusion. Only transfuse if symptomatic anemia. Do not transfuse for simple pain crisis  Of note, she did receive 3 days of Desferal  and was transfused 1 unit PRBC during hospitalization at [facility] 11/2021.  Desferal x 10 days planned for 3/2022 but patient decided against Desferal, will continue to monitor ferritin closely and will Obtain hg electrophoresis, ferritin, iron, tibc, CBC w/ diff, CMP, folate, retic, UA for urine protein 1 week prior to return visit  Unknown prior vaccinations. She believes she last got PNA vaccinations ~5 years ago.  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        Plan:   9/23/24  - Reinforced medication compliance, good oral hydration, diet, and rest for current pain crisis.  - continue folic acid 1 mg daily,  - continue hydroxyurea 1500 daily  -  continueJadenu 1080 daily  - C/w antinausea meds  - Continue with Periactin for appetite  - Continue with pain medication for all pain crisis.    -- Repeat labs on follow up including hemoglobin electrophoresis- RTC 4 WEEKS.    Vicki Spencer FNP-C

## 2024-10-21 DIAGNOSIS — D57.00 HB-SS DISEASE WITH CRISIS: ICD-10-CM

## 2024-10-21 DIAGNOSIS — D57.00 SICKLE CELL PAIN CRISIS: ICD-10-CM

## 2024-10-21 RX ORDER — FENTANYL 100 UG/H
PATCH TRANSDERMAL
Qty: 10 PATCH | Refills: 0 | OUTPATIENT
Start: 2024-10-21

## 2024-10-21 RX ORDER — OXYCODONE AND ACETAMINOPHEN 10; 325 MG/1; MG/1
1 TABLET ORAL EVERY 6 HOURS PRN
Qty: 120 TABLET | Refills: 0 | OUTPATIENT
Start: 2024-10-21 | End: 2025-10-21

## 2024-10-21 NOTE — PROGRESS NOTES
Diagnosis:  - SCD    Treatment History:  - Folic acid, Hydrea, Jadenu    Current Treatment:   - hydrea 1500 mg daily  - Jadenu 1080 mg daily  - Folic acid 1 mg daily        Subjective:       HPI  Anya Matt Leal  34 y.o.  female with past medical history significant for SCD here for follow-up  According to the patient she had been on folic acid, hydroxyurea and jadenu in the past.  Patient had not been taking her HU and Jadenu recently.  Patient had multiple transfusions over the year, requiring almost 10 transfusion last year.  Denies any history of exchange transfusion or acute chest syndrome.  Denies any history of avascular necrosis.  Denies any history of stroke. No ACS ever in her life never intubated Never full exchange transfusions >20 lifetime transfusions, in 2019 pt reports ~10 units of PRBC overall  No AVN  Never told she has iron overload and has never been on iron chelation prior to initial presentation  No strokes  Not on any pediatric transfusion regimen  >10 pain crisis per year (occur almost with every cycle requiring frequent hospital visits)  hospitalization 2/2021, had partial exchange of 1 unit  October 2023 reports pain crisis the last week of October, which involved a hospital admission for 2 days along with 2 units of PRBCs, IV pain medication, and IVFs.  May 11-20, 2024:  Sickle cell vaso-occlusive pain crisis with 2 units of blood, IVF, and pain medication.    Interval History:   10/22/2024:  Ms. 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 She reports compliance with Folic acid, Hydrea, and Jadenu.   Refilled medication today and reinforce importance of compliance with medications in order to avoid crises. She reports pain. She denies, chills, headaches, dizziness, cough, SOB, abnormal bleeding, rash, nausea, weight loss, abdominal pain, flank pain, change in bowel or bladder habits, no lower extremity pain or swelling, no leg ulcers, and no  neuropathy.  Reports eating when she feels not so fatigued.  Drinking greater than 6 bottles of water a day.  Weight is stable.  She reports up-to-date on all vaccinations. Will refer to cardiology today for recurrent syncopal episodes.    Past Medical History:   Diagnosis Date    Calculus of gallbladder without cholecystitis without obstruction     Encounter for blood transfusion     Flu 04/2019    sore throat    Sickle cell anemia       Past Surgical History:   Procedure Laterality Date    GALLBLADDER SURGERY  01/05/2024    MEDIPORT INSERTION, SINGLE      TUBAL LIGATION       Social History     Socioeconomic History    Marital status:    Tobacco Use    Smoking status: Never    Smokeless tobacco: Never   Substance and Sexual Activity    Alcohol use: No    Drug use: No    Sexual activity: Yes     Partners: Male      Family History   Problem Relation Name Age of Onset    Hypertension Mother        Review of patient's allergies indicates:   Allergen Reactions    Aspirin     Butorphanol     Compazine [prochlorperazine edisylate]     Ketorolac      Other reaction(s): other    Morpholine analogues     Nubain [nalbuphine]     Stadol [butorphanol tartrate]     Tramadol Rash     hives      CONSTITUTIONAL: no fevers, no chills, no weight loss, + fatigue, no weakness  HEMATOLOGIC: no abnormal bleeding, no abnormal bruising, no drenching night sweats  ONCOLOGIC: no new masses or lumps  HEENT: no vision loss, no tinnitus or hearing loss, no nose bleeding, no dysphagia, no odynophagia  CVS: no chest pain, no palpitations, no dyspnea on exertion  RESP: no shortness of breath, no hemoptysis, no cough  GI: + nausea, no vomiting, no diarrhea, no constipation, no melena, no hematochezia, no hematemesis, no abdominal pain, no increase in abdominal girth  : no dysuria, no hematuria, no hesitancy, no scrotal swelling, no discharge  INTEGUMENT: no rashes, no abnormal bruising, no nail pitting, no hyperpigmentation  NEURO: no  falls, no memory loss, no paresthesias or dysesthesias, no urofecal incontinence or retention, no loss of strength on any extremity  MSK: + muscle, bone, and joint pain all extremities and back.    PSYCH: no suicidal or homicidal ideation, no depression, no insomnia, no anhedonia  ENDOCRINE: no heat or cold intolerance, no polyuria, no polydipsia    Objective:        Vitals:    10/22/24 1041   BP: 127/88   Pulse: 97   Resp: 18   Temp: 98.2 °F (36.8 °C)     ECOG PS 0  GA: AAOx3, NAD  HEENT:  Scleral icterus EOMI, good dentition, moist oral mucous membranes  LYMPH: no cervical, axillary or supraclavicular adenopathy  CVS: s1s2 RRR, no M/R/G, port in place without surrounding edema or erythema  RESP: CTA b/l, no crackles, no wheezes or rhonchi  ABD: soft, NT, ND, BS+, no hepatosplenomegaly  EXT: no deformities, no pedal edema  SKIN: no rashes, warm and dry  NEURO: normal mentation, strength 5/5 on all 4 extremities, no sensory deficits      LABS / IMAGING    10/18/2024 :  Creatinine 0.82, AST 17, and ALT 9, T. Bili 2.1, ferritin 1625, folic acid 16.87, WBC 12.88, Hgb 7.8, Hct 23, Plt 496,000. Hgb electrophoresis pending    9/3/2024 :  Creatinine 0.92, AST 33, and ALT 15, T. Bili 1.6, ferritin 2096, folic acid 16.75, WBC 10.31, Hgb 6.8, Hct 19.9, Plt 388,000. Hgb electrophoresis Hgb F 6.6    7/26/2024 :  Creatinine 1.21, AST 30, and ALT 9, T. Bili 3.0, , ferritin 2761, folic acid 6.98, WBC 20.15, Hgb 7.7, Hct 22.7, Plt 522,000.     6/26/2024 :  Creatinine 0.78, AST 29, and ALT 11, T. Bili 2.59, , ferritin 2460, folic acid 8.0, WBC 14.18, Hgb 7.6, Hct 23.3, Plt 548,000.     3/4/2024:  Creatinine 0.88, Total Bili 2.2, WBC 13.55, Hgb 8.0, Hct 24.1, Platelets 534,000.    1/31/2024: Creatinine 0.83, Total Bili 2.0, , ferritin 2751, folic acid 8.23, WBC 12.39, Hgb 8.3, Hct 25.2, Platelets 512,000, Hgb electrophoresis Hgb F 5.7.   Other labs reviewed, Hg ranged 7-10, normal Cr  9/5/17 Hg A 9.3 Hg A2 3.8 Hg  F 3.6 Hg S 83.3  6/3/2020 hgb 9.5, WBC 10.7, , MCV 96.7, RDW 18.4 Hg A 30.6 Hg F 5.2% Hg S 60.7%  8/10/20 Hg F 7.8% Hg A 10.8% Hg S 77.4% ferritin 2997 Cr 0.64 Hg 9 .3  ANC 7.9  10/5/20 Cr 0.63, Tbili 2.4, , Ferritin 2556, WBC 8.23, Hgb 8.9, .5, , retic 8.41, ANC 4.78, Urine Protein 11.56, Hgb A 0% A21 3.8% F 8.9% S 83.5%  11/23/20 Cr 0.69, TP 8.5, ALB 4.6, alk phos 77, T bili 2.2, , iron 151, TIBC 194, iron sat 78%, ferritin 2746, WBC 8.47, Hgb 9.2, , retic 26K, ANC 5.69, urine protein 40.21, Hgb F 10.2, Hgb S 83.8  1/27/21 Hg A 0% A2 3.8 F 11.3% S 82.5% Other 2.4% Cr 0.61 Alb 4.3 TP 7.8 Tbili 2.1 AST 19 ALT 10  Iron 128 Ferritin 2218 WBC 9.87 Hg 9.1 .2  ANC 6.6 Abs retic 125k urine protein 12.17 mg/dl  3/31/21 Cr 0.69, TP 8.1, T bili 2.2, , Ferritin 2166, Folic acid 19.68, WBC 8.23, Hgb 8.0, .3, , ANC 3.90, abs retic ~175K, urine protein neg, urine culture neg, Hgb A 0%, Hgb A2 4.1%, Hgb F 11.7%, Hgb S 80.5% other 3.7%  8/23/21 WBC 9.59, Hgb 7.9, .4, , Cr 0.74, Tbili 1.5, , Folic acid 10.48, Ferritin 1460, UA protein neg, Hgb A2 3.5% F 10.8% S 82.6%  10/15/21 Hg A2 3.2% Hg F 9.2% Hg S 84.5% Cr 0.67 Alb 4.5 Tbili 1.8  Ferritin 1850 Folate 11.45 WBC 10.47 Hg 8.5  Retic 192k UA protein neg  12/8/21 Hg A 18.1% A2 3.1% F 7.7% S 71.1%, , Ferritin 1654, Folic acid 13.44, WBC 10.39, Hgb 8.1, PLT 42, Retic 6.13%, Urine protein neg  2/3/22 Cr 0.75 Alb 4.7 TP 8.2 Tbili 2.9  Ferritin 1729 Folate 15.9 WBC 11.75 Hg 7.4 RBC 2.25 MCV 97.8 RDW 17.9  Retic 5.85% Urine protein neg Hg A 10.6% Hg F 8.3% Hg S 78.1% Hg A2 3%  4/25/22 Cr 0.77, Alb 4.5, TP 7.8, Tbili 1.8, , Ferritin 1584, folic acid 15, WBC 9.90, Hgb 8.2, , ANC 3.77, urine protein 10.47 mg/dL, hgb electrophoresis pending**  - 10/21/2022:  WBC 15.63, hemoglobin 7.4, hematocrit 21.1, platelet count 454K  -  12/01/2022: WBC 13.36, hemoglobin 7.5, hematocrit 22.6, platelet count 4 4 6.  MCV 95.8    -04/18/23: Chloride 109, cr 0.71, alb 4.5, ca 9.5, TB 2.4, , LFTs WNL, Ferritin 1457, uric acid 7.2, wbc 11.87, hgb 8.0, plt 508, ANC 4.51, retic 9.39,   -5/19/23 CMP WNL except bili 2.9, , ferritin 1467, folate 16, UA 8.1 vit D 4, , WBC 15.87, HGB 6.9, HCT 20,  , retic 9.46,   06/20/2023:  WBC count 10.3, hemoglobin 8.5, MCV 97.6, platelet count 5 110, ANC 5.9, creatinine 0.72, albumin 4.6, calcium 9.2, LFTs within normal limits, bilirubin 1.7, ferritin 1261, hemoglobin F was ordered but not done.  7/29/2023:  CMP:  abnormal only:  Cl 110, bilirubin 1.9.  , Ferritin 1449, Uric acid 7.5, Vit D 6 ng/mL,  CBC:  abnormal only:  WBC 13.87, Hgb 7.7, Hct 22.1, MCV 98.7, RDW-CV 16.6, platelets 387,000, retic 8.54, Lymph 6.44, mono 1.18.   8/30/2023:  CMP:  Cl- 109, BUN 6.97, creatinine 0.78,  Total Bili 2.1, , Ferritin 1575.  CBC:  WBC 11.66, Hgb 7.9, Hct 23.5, platelets 428,000, Retic 8.23.  8/23/2023: Hgb F 6.1.  9/26/2023:  Cl- 111, Creatinine 0.84, Total Bili 2.4, , Ferritin 2215, WBC 16.06, Hgb 7.0, Hct 21.0, Plt 545,000, retic 4.18, Hgb F 5.4.  12/06/2023:  Creatinine 0.80, Total Bili 2.9, AST and ALT WNL, , ferritin 1637, folic acid 9.47, WBC 12.65, Hgb 7.6, Hct 22.4, Plt 413,000.  No protein electrophoresis.    1/2/2024:  Creatinine 0.93, Total Bili 1.6, , ferritin 1489, folic acid 5.02, WBC 15.48, Hgb 7.0, Hct 21.4, Platelets 926,000, retic 10.94.     Imaging:  reviewed, X-Rays of b/l hips 11/2019 no AVN, CXR unremarkable    Assessment:   1. Sickle cell disease D57.1 C/w hydrea 1500mg daily, Hg F not at goal on most recent lab work, Hgb electrophoresis obtained today still pending  C/w folic acid 1mg daily  Adequate PO hydration  Analgesia with fentanyl and percocet 10mg as needed. C/w fentanyl 100mcg/hr,   Avoid overtransfusion. Only transfuse if symptomatic anemia. Do  not transfuse for simple pain crisis  Of note, she did receive 3 days of Desferal and was transfused 1 unit PRBC during hospitalization at [facility] 11/2021.  Desferal x 10 days planned for 3/2022 but patient decided against Desferal, will continue to monitor ferritin closely and will Obtain hg electrophoresis, ferritin, iron, tibc, CBC w/ diff, CMP, folate, retic, UA for urine protein 1 week prior to return visit  Unknown prior vaccinations. She believes she last got PNA vaccinations ~5 years ago.  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        Plan:   10/22/24  - Reinforced medication compliance, good oral hydration, diet, and rest   - continue folic acid 1 mg daily,  - continue hydroxyurea 1500 daily  - continueJadenu 1080 daily  - C/w antinausea meds  - Continue with Periactin for appetite  - Continue with pain medication for all pain crisis.    - Repeat labs on follow up including hemoglobin electrophoresis- RTC 4 WEEKS.    Vicki Spencer FNP-C

## 2024-10-22 ENCOUNTER — OFFICE VISIT (OUTPATIENT)
Dept: HEMATOLOGY/ONCOLOGY | Facility: CLINIC | Age: 34
End: 2024-10-22
Payer: MEDICAID

## 2024-10-22 VITALS
SYSTOLIC BLOOD PRESSURE: 127 MMHG | WEIGHT: 166.5 LBS | RESPIRATION RATE: 18 BRPM | OXYGEN SATURATION: 97 % | BODY MASS INDEX: 27.74 KG/M2 | HEART RATE: 97 BPM | DIASTOLIC BLOOD PRESSURE: 88 MMHG | HEIGHT: 65 IN | TEMPERATURE: 98 F

## 2024-10-22 DIAGNOSIS — D57.00 HB-SS DISEASE WITH CRISIS: Primary | ICD-10-CM

## 2024-10-22 DIAGNOSIS — D57.00 SICKLE CELL PAIN CRISIS: ICD-10-CM

## 2024-10-22 DIAGNOSIS — D57.00 SICKLE CELL DISEASE WITH CRISIS: ICD-10-CM

## 2024-10-22 DIAGNOSIS — D57.00 HB-SS DISEASE WITH CRISIS: ICD-10-CM

## 2024-10-22 DIAGNOSIS — R55 SYNCOPE AND COLLAPSE: ICD-10-CM

## 2024-10-22 DIAGNOSIS — E83.111 IRON OVERLOAD DUE TO REPEATED RED BLOOD CELL TRANSFUSIONS: ICD-10-CM

## 2024-10-22 PROCEDURE — 3074F SYST BP LT 130 MM HG: CPT | Mod: CPTII,,,

## 2024-10-22 PROCEDURE — 1160F RVW MEDS BY RX/DR IN RCRD: CPT | Mod: CPTII,,,

## 2024-10-22 PROCEDURE — 99214 OFFICE O/P EST MOD 30 MIN: CPT | Mod: ,,,

## 2024-10-22 PROCEDURE — 1159F MED LIST DOCD IN RCRD: CPT | Mod: CPTII,,,

## 2024-10-22 PROCEDURE — 3079F DIAST BP 80-89 MM HG: CPT | Mod: CPTII,,,

## 2024-10-22 PROCEDURE — 3008F BODY MASS INDEX DOCD: CPT | Mod: CPTII,,,

## 2024-10-22 RX ORDER — OXYCODONE AND ACETAMINOPHEN 10; 325 MG/1; MG/1
1 TABLET ORAL EVERY 6 HOURS PRN
Qty: 120 TABLET | Refills: 0 | Status: SHIPPED | OUTPATIENT
Start: 2024-10-22 | End: 2025-10-22

## 2024-10-23 RX ORDER — FENTANYL 100 UG/H
PATCH TRANSDERMAL
Qty: 10 PATCH | Refills: 0 | Status: SHIPPED | OUTPATIENT
Start: 2024-10-23

## 2024-11-19 DIAGNOSIS — D57.00 SICKLE CELL PAIN CRISIS: ICD-10-CM

## 2024-11-19 DIAGNOSIS — D57.00 HB-SS DISEASE WITH CRISIS: ICD-10-CM

## 2024-11-19 RX ORDER — FENTANYL 100 UG/H
PATCH TRANSDERMAL
Qty: 10 PATCH | Refills: 0 | Status: SHIPPED | OUTPATIENT
Start: 2024-11-22

## 2024-11-19 RX ORDER — OXYCODONE AND ACETAMINOPHEN 10; 325 MG/1; MG/1
1 TABLET ORAL EVERY 6 HOURS PRN
Qty: 120 TABLET | Refills: 0 | Status: SHIPPED | OUTPATIENT
Start: 2024-11-22 | End: 2025-11-22

## 2024-11-21 ENCOUNTER — OFFICE VISIT (OUTPATIENT)
Dept: HEMATOLOGY/ONCOLOGY | Facility: CLINIC | Age: 34
End: 2024-11-21
Payer: MEDICAID

## 2024-11-21 VITALS
RESPIRATION RATE: 18 BRPM | HEART RATE: 108 BPM | HEIGHT: 65 IN | BODY MASS INDEX: 27.64 KG/M2 | SYSTOLIC BLOOD PRESSURE: 115 MMHG | WEIGHT: 165.88 LBS | DIASTOLIC BLOOD PRESSURE: 76 MMHG | TEMPERATURE: 98 F | OXYGEN SATURATION: 99 %

## 2024-11-21 DIAGNOSIS — D57.00 HB-SS DISEASE WITH CRISIS: Primary | ICD-10-CM

## 2024-11-21 PROCEDURE — 99214 OFFICE O/P EST MOD 30 MIN: CPT | Mod: ,,, | Performed by: INTERNAL MEDICINE

## 2024-11-21 PROCEDURE — 3008F BODY MASS INDEX DOCD: CPT | Mod: CPTII,,, | Performed by: INTERNAL MEDICINE

## 2024-11-21 PROCEDURE — 1159F MED LIST DOCD IN RCRD: CPT | Mod: CPTII,,, | Performed by: INTERNAL MEDICINE

## 2024-11-21 PROCEDURE — 3078F DIAST BP <80 MM HG: CPT | Mod: CPTII,,, | Performed by: INTERNAL MEDICINE

## 2024-11-21 PROCEDURE — 3074F SYST BP LT 130 MM HG: CPT | Mod: CPTII,,, | Performed by: INTERNAL MEDICINE

## 2024-11-21 NOTE — PROGRESS NOTES
Diagnosis:  - SCD     Treatment History:  - Folic acid, Hydrea, Jadenu     Current Treatment:   - hydrea 1500 mg daily  - Jadenu 1080 mg daily  - Folic acid 1 mg daily          Subjective:         HPI  Anya Matt Leal  34 y.o.  female with past medical history significant for SCD here for follow-up  According to the patient she had been on folic acid, hydroxyurea and jadenu in the past.  Patient had not been taking her HU and Jadenu recently.  Patient had multiple transfusions over the year, requiring almost 10 transfusion last year.  Denies any history of exchange transfusion or acute chest syndrome.  Denies any history of avascular necrosis.  Denies any history of stroke. No ACS ever in her life never intubated Never full exchange transfusions >20 lifetime transfusions, in 2019 pt reports ~10 units of PRBC overall  No AVN  Never told she has iron overload and has never been on iron chelation prior to initial presentation  No strokes  Not on any pediatric transfusion regimen  >10 pain crisis per year (occur almost with every cycle requiring frequent hospital visits)  hospitalization 2/2021, had partial exchange of 1 unit  October 2023 reports pain crisis the last week of October, which involved a hospital admission for 2 days along with 2 units of PRBCs, IV pain medication, and IVFs.  May 11-20, 2024:  Sickle cell vaso-occlusive pain crisis with 2 units of blood, IVF, and pain medication.     Interval History:     11/21/24  Here for follow up.  She continues on folic acid Hydrea NJ new and tolerating well.  Unfortunately she has noted with cooler temperatures that she is having increased pain and weakness.  Her labs from October show hemoglobin F at 8.5 %    10/22/2024:  Ms. 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 She reports compliance with Folic acid, Hydrea, and Jadenu.   Refilled medication today and reinforce importance of compliance with medications in order  to avoid crises. She reports pain. She denies, chills, headaches, dizziness, cough, SOB, abnormal bleeding, rash, nausea, weight loss, abdominal pain, flank pain, change in bowel or bladder habits, no lower extremity pain or swelling, no leg ulcers, and no neuropathy.  Reports eating when she feels not so fatigued.  Drinking greater than 6 bottles of water a day.  Weight is stable.  She reports up-to-date on all vaccinations. Will refer to cardiology today for recurrent syncopal episodes.      Past Medical History:   Diagnosis Date    Calculus of gallbladder without cholecystitis without obstruction     Encounter for blood transfusion     Flu 04/2019    sore throat    Sickle cell anemia        Past Surgical History:   Procedure Laterality Date    GALLBLADDER SURGERY  01/05/2024    MEDIPORT INSERTION, SINGLE      TUBAL LIGATION         Family History   Problem Relation Name Age of Onset    Hypertension Mother         Social History     Socioeconomic History    Marital status:    Tobacco Use    Smoking status: Never    Smokeless tobacco: Never   Substance and Sexual Activity    Alcohol use: No    Drug use: No    Sexual activity: Yes     Partners: Male       Current Outpatient Medications   Medication Sig Dispense Refill    cholecalciferol, vitamin D3, 1,250 mcg (50,000 unit) capsule Take 1 capsule (50,000 Units total) by mouth every 7 days. 20 capsule 1    cyproheptadine (PERIACTIN) 4 mg tablet Take 1 tablet (4 mg total) by mouth 3 (three) times daily as needed (appetite). 90 tablet 1    deferasirox (JADENU) 360 mg Tab Take 3 tablets (1,080 mg total) by mouth once daily. 90 tablet 3    [START ON 11/22/2024] fentaNYL (DURAGESIC) 100 mcg/hr REMOVE old PATCH AND apply ONE PATCH TO SKIN EVERY 72 HOURS 10 patch 0    fluticasone propionate (FLONASE) 50 mcg/actuation nasal spray 1 spray (50 mcg total) by Each Nostril route once daily. 9.9 mL 2    folic acid (FOLVITE) 1 MG tablet Take 1 tablet (1 mg total) by mouth  once daily. 90 tablet 3    hydroxyurea (HYDREA) 500 mg Cap Take 3 capsules (1,500 mg total) by mouth once daily. 90 capsule 5    ibuprofen (ADVIL,MOTRIN) 800 MG tablet   1    lisinopril (PRINIVIL,ZESTRIL) 20 MG tablet Take by mouth once daily.      ondansetron (ZOFRAN) 4 MG tablet Take 2 tablets (8 mg total) by mouth every 8 (eight) hours as needed for Nausea. 90 tablet 6    ondansetron (ZOFRAN-ODT) 4 MG TbDL Take 2 tablets (8 mg total) by mouth 2 (two) times daily. 30 tablet 3    [START ON 11/22/2024] oxyCODONE-acetaminophen (PERCOCET)  mg per tablet Take 1 tablet by mouth every 6 (six) hours as needed for Pain. 120 tablet 0    pantoprazole (PROTONIX) 40 MG tablet Take 1 tablet (40 mg total) by mouth once daily. 120 tablet 1    promethazine (PHENERGAN) 25 MG tablet Take 1 tablet (25 mg total) by mouth every 6 (six) hours as needed for Nausea. 90 tablet 5    acyclovir (ZOVIRAX) 400 MG tablet Take 1 tablet (400 mg total) by mouth 2 (two) times daily. for 5 days 10 tablet 0    amLODIPine (NORVASC) 5 MG tablet  (Patient not taking: Reported on 11/21/2024)  0     No current facility-administered medications for this visit.       Review of patient's allergies indicates:   Allergen Reactions    Aspirin     Butorphanol     Compazine [prochlorperazine edisylate]     Ketorolac      Other reaction(s): other    Morpholine analogues     Nubain [nalbuphine]     Stadol [butorphanol tartrate]     Tramadol Rash     hives         Review of systems  CONSTITUTIONAL: no fevers, no chills, no weight loss, no fatigue, no weakness  HEMATOLOGIC: no abnormal bleeding, no abnormal bruising, no drenching night sweats  ONCOLOGIC: no new masses or lumps  HEENT: no vision loss, no tinnitus or hearing loss, no nose bleeding, no dysphagia, no odynophagia  CVS: no chest pain, no palpitations, no dyspnea on exertion  RESP: no shortness of breath, no hemoptysis, no cough  GI: no nausea, no vomiting, no diarrhea, no constipation, no melena, no  hematochezia, no hematemesis, no abdominal pain, no increase in abdominal girth  : no dysuria, no hematuria, no hesitancy, no scrotal swelling, no discharge  INTEGUMENT: no rashes, no abnormal bruising, no nail pitting, no hyperpigmentation  NEURO: no falls, no memory loss, no paresthesias or dysesthesias, no urofecal incontinence or retention, no loss of strength on any extremity  MSK: no back pain, no new joint pain, no joint swelling  PSYCH: no suicidal or homicidal ideation, no depression, no insomnia, no anhedonia  ENDOCRINE: no heat or cold intolerance, no polyuria, no polydipsia      Physical Exam:  Vitals:    11/21/24 1031   BP: 115/76   Pulse: 108   Resp: 18   Temp: 98.2 °F (36.8 °C)       ECOG PS 0  GA: AAOx3, NAD  HEENT: NCAT, PERRLA, EOMI, good dentition, moist oral mucous membranes  LYMPH: no cervical, axillary or supraclavicular adenopathy  CVS: s1s2 RRR, no M/R/G  RESP: CTA b/l, no crackles, no wheezes or rhonchi  ABD: soft, NT, ND, BS+, no hepatosplenomegaly  EXT: no deformities, no pedal edema  SKIN: no rashes, warm and dry  NEURO: normal mentation, strength 5/5 on all 4 extremities, no sensory deficits      LABS   10/24/24  Hgb F 8.5, Hgb S 80.8   11/18/24  AST 19, ALT 6, , Ferritin 1383 , % sat 106, WBC 12 Hgb 7.6 .8 Platelet 431   1. Sickle cell disease D57.1 C/w hydrea 1500mg daily, Hg F not at goal on most recent lab work, Hgb electrophoresis at 8.5  C/w folic acid 1mg daily  Adequate PO hydration  Analgesia with fentanyl and percocet 10mg as needed. C/w fentanyl 100mcg/hr,   Avoid overtransfusion. Only transfuse if symptomatic anemia. Do not transfuse for simple pain crisis  Of note, she did receive 3 days of Desferal and was transfused 1 unit PRBC during hospitalization at [facility] 11/2021.  Desferal x 10 days planned for 3/2022 but patient decided against Desferal, will continue to monitor ferritin closely and will Obtain hg electrophoresis, ferritin, iron, tibc, CBC w/ diff,  CMP, folate, retic, UA for urine protein 1 week prior to return visit  Unknown prior vaccinations. She believes she last got PNA vaccinations ~5 years ago.  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           Plan:   10/22/24  - Reinforced medication compliance, good oral hydration, diet, and rest   - continue folic acid 1 mg daily,  - increase hydroxyurea 2000 daily  - continueJadenu 1080 daily  - C/w antinausea meds  - Continue with Periactin for appetite  - Continue with pain medication for all pain crisis.    - Repeat labs on follow up including hemoglobin electrophoresis- RTC 6 WEEKS.      A total of  30 minutes were spent in review of records, interpretation of test, coordination of care, discussion and counseling with the patient.        Portions of the record may have been created with voice recognition software. Occasional wrong-word or sound-a-like substitutions may have occurred due to the inherent limitations of voice recognition software. Read the chart carefully and recognize, using context, where substitutions have occurred.

## 2024-12-18 DIAGNOSIS — D57.00 HB-SS DISEASE WITH CRISIS: ICD-10-CM

## 2024-12-18 DIAGNOSIS — D57.00 SICKLE CELL PAIN CRISIS: ICD-10-CM

## 2024-12-18 RX ORDER — OXYCODONE AND ACETAMINOPHEN 10; 325 MG/1; MG/1
1 TABLET ORAL EVERY 6 HOURS PRN
Qty: 120 TABLET | Refills: 0 | Status: SHIPPED | OUTPATIENT
Start: 2024-12-18 | End: 2025-12-18

## 2024-12-18 RX ORDER — FENTANYL 100 UG/H
PATCH TRANSDERMAL
Qty: 10 PATCH | Refills: 0 | Status: SHIPPED | OUTPATIENT
Start: 2024-12-18

## 2025-01-07 ENCOUNTER — OFFICE VISIT (OUTPATIENT)
Dept: HEMATOLOGY/ONCOLOGY | Facility: CLINIC | Age: 35
End: 2025-01-07
Payer: MEDICAID

## 2025-01-07 VITALS
OXYGEN SATURATION: 98 % | SYSTOLIC BLOOD PRESSURE: 108 MMHG | DIASTOLIC BLOOD PRESSURE: 75 MMHG | TEMPERATURE: 98 F | RESPIRATION RATE: 18 BRPM | BODY MASS INDEX: 26.72 KG/M2 | HEIGHT: 65 IN | WEIGHT: 160.38 LBS | HEART RATE: 111 BPM

## 2025-01-07 DIAGNOSIS — D57.00 HB-SS DISEASE WITH CRISIS: Primary | ICD-10-CM

## 2025-01-07 DIAGNOSIS — E83.111 IRON OVERLOAD DUE TO REPEATED RED BLOOD CELL TRANSFUSIONS: ICD-10-CM

## 2025-01-07 PROCEDURE — 99214 OFFICE O/P EST MOD 30 MIN: CPT | Mod: ,,, | Performed by: INTERNAL MEDICINE

## 2025-01-07 PROCEDURE — 1159F MED LIST DOCD IN RCRD: CPT | Mod: CPTII,,, | Performed by: INTERNAL MEDICINE

## 2025-01-07 PROCEDURE — 3008F BODY MASS INDEX DOCD: CPT | Mod: CPTII,,, | Performed by: INTERNAL MEDICINE

## 2025-01-07 PROCEDURE — 3074F SYST BP LT 130 MM HG: CPT | Mod: CPTII,,, | Performed by: INTERNAL MEDICINE

## 2025-01-07 PROCEDURE — 3078F DIAST BP <80 MM HG: CPT | Mod: CPTII,,, | Performed by: INTERNAL MEDICINE

## 2025-01-07 NOTE — PROGRESS NOTES
Diagnosis:  - SCD     Treatment History:  - Folic acid, Hydrea, Jadenu     Current Treatment:   - hydrea 1500 mg daily  - Jadenu 1080 mg daily  - Folic acid 1 mg daily          Subjective:         HPI  Anya Leal  34 y.o.  female with past medical history significant for SCD here for follow-up  According to the patient she had been on folic acid, hydroxyurea and jadenu in the past.  Patient had not been taking her HU and Jadenu recently.  Patient had multiple transfusions over the year, requiring almost 10 transfusion last year.  Denies any history of exchange transfusion or acute chest syndrome.  Denies any history of avascular necrosis.  Denies any history of stroke. No ACS ever in her life never intubated Never full exchange transfusions >20 lifetime transfusions, in 2019 pt reports ~10 units of PRBC overall  No AVN  Never told she has iron overload and has never been on iron chelation prior to initial presentation  No strokes  Not on any pediatric transfusion regimen  >10 pain crisis per year (occur almost with every cycle requiring frequent hospital visits)  hospitalization 2/2021, had partial exchange of 1 unit  October 2023 reports pain crisis the last week of October, which involved a hospital admission for 2 days along with 2 units of PRBCs, IV pain medication, and IVFs.  May 11-20, 2024:  Sickle cell vaso-occlusive pain crisis with 2 units of blood, IVF, and pain medication.     Interval History:          01/07/2025: Patient continues to do well with folic acid and Hydrea.  Her labs show hemoglobin of 7.9 white blood cell count 91709  platelet count 410 with ferritin 1661 and iron 84 % sat 39. She is staying indoors to avoid cold      11/21/24  Here for follow up.  She continues on folic acid Hydrea NJ new and tolerating well.  Unfortunately she has noted with cooler temperatures that she is having increased pain and weakness.  Her labs from October show hemoglobin F at 8.5 %          10/22/2024:  Ms. 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 She reports compliance with Folic acid, Hydrea, and Jadenu.   Refilled medication today and reinforce importance of compliance with medications in order to avoid crises. She reports pain. She denies, chills, headaches, dizziness, cough, SOB, abnormal bleeding, rash, nausea, weight loss, abdominal pain, flank pain, change in bowel or bladder habits, no lower extremity pain or swelling, no leg ulcers, and no neuropathy.  Reports eating when she feels not so fatigued.  Drinking greater than 6 bottles of water a day.  Weight is stable.  She reports up-to-date on all vaccinations. Will refer to cardiology today for recurrent syncopal episodes.      Past Medical History:   Diagnosis Date    Calculus of gallbladder without cholecystitis without obstruction     Encounter for blood transfusion     Flu 04/2019    sore throat    Sickle cell anemia        Past Surgical History:   Procedure Laterality Date    GALLBLADDER SURGERY  01/05/2024    MEDIPORT INSERTION, SINGLE      TUBAL LIGATION         Family History   Problem Relation Name Age of Onset    Hypertension Mother         Social History     Socioeconomic History    Marital status:    Tobacco Use    Smoking status: Never    Smokeless tobacco: Never   Substance and Sexual Activity    Alcohol use: No    Drug use: No    Sexual activity: Yes     Partners: Male       Current Outpatient Medications   Medication Sig Dispense Refill    cholecalciferol, vitamin D3, 1,250 mcg (50,000 unit) capsule Take 1 capsule (50,000 Units total) by mouth every 7 days. 20 capsule 1    cyproheptadine (PERIACTIN) 4 mg tablet Take 1 tablet (4 mg total) by mouth 3 (three) times daily as needed (appetite). 90 tablet 1    deferasirox (JADENU) 360 mg Tab Take 3 tablets (1,080 mg total) by mouth once daily. 90 tablet 3    fentaNYL (DURAGESIC) 100 mcg/hr REMOVE old PATCH AND apply ONE PATCH TO SKIN  EVERY 72 HOURS 10 patch 0    fluticasone propionate (FLONASE) 50 mcg/actuation nasal spray 1 spray (50 mcg total) by Each Nostril route once daily. 9.9 mL 2    folic acid (FOLVITE) 1 MG tablet Take 1 tablet (1 mg total) by mouth once daily. 90 tablet 3    hydroxyurea (HYDREA) 500 mg Cap Take 3 capsules (1,500 mg total) by mouth once daily. 90 capsule 5    lisinopril (PRINIVIL,ZESTRIL) 20 MG tablet Take by mouth once daily.      ondansetron (ZOFRAN-ODT) 4 MG TbDL Take 2 tablets (8 mg total) by mouth 2 (two) times daily. 30 tablet 3    oxyCODONE-acetaminophen (PERCOCET)  mg per tablet Take 1 tablet by mouth every 6 (six) hours as needed for Pain. 120 tablet 0    pantoprazole (PROTONIX) 40 MG tablet Take 1 tablet (40 mg total) by mouth once daily. 120 tablet 1    promethazine (PHENERGAN) 25 MG tablet Take 1 tablet (25 mg total) by mouth every 6 (six) hours as needed for Nausea. 90 tablet 5    acyclovir (ZOVIRAX) 400 MG tablet Take 1 tablet (400 mg total) by mouth 2 (two) times daily. for 5 days 10 tablet 0    amLODIPine (NORVASC) 5 MG tablet  (Patient not taking: Reported on 7/29/2024)  0    ibuprofen (ADVIL,MOTRIN) 800 MG tablet  (Patient not taking: Reported on 1/7/2025)  1    ondansetron (ZOFRAN) 4 MG tablet Take 2 tablets (8 mg total) by mouth every 8 (eight) hours as needed for Nausea. (Patient not taking: Reported on 1/7/2025) 90 tablet 6     No current facility-administered medications for this visit.       Review of patient's allergies indicates:   Allergen Reactions    Aspirin     Butorphanol     Compazine [prochlorperazine edisylate]     Ketorolac      Other reaction(s): other    Morpholine analogues     Nubain [nalbuphine]     Stadol [butorphanol tartrate]     Tramadol Rash     hives         Review of systems  CONSTITUTIONAL: no fevers, no chills, no weight loss, no fatigue, no weakness  HEMATOLOGIC: no abnormal bleeding, no abnormal bruising, no drenching night sweats  ONCOLOGIC: no new masses or  lumps  HEENT: no vision loss, no tinnitus or hearing loss, no nose bleeding, no dysphagia, no odynophagia  CVS: no chest pain, no palpitations, no dyspnea on exertion  RESP: no shortness of breath, no hemoptysis, no cough  GI: no nausea, no vomiting, no diarrhea, no constipation, no melena, no hematochezia, no hematemesis, no abdominal pain, no increase in abdominal girth  : no dysuria, no hematuria, no hesitancy, no scrotal swelling, no discharge  INTEGUMENT: no rashes, no abnormal bruising, no nail pitting, no hyperpigmentation  NEURO: no falls, no memory loss, no paresthesias or dysesthesias, no urofecal incontinence or retention, no loss of strength on any extremity  MSK: no back pain, no new joint pain, no joint swelling  PSYCH: no suicidal or homicidal ideation, no depression, no insomnia, no anhedonia  ENDOCRINE: no heat or cold intolerance, no polyuria, no polydipsia      Physical Exam:  Vitals:    01/07/25 1014   BP: 108/75   Pulse: (!) 111   Resp: 18   Temp: 98.4 °F (36.9 °C)       ECOG PS 0  GA: AAOx3, NAD  HEENT: NCAT, PERRLA, EOMI, good dentition, moist oral mucous membranes  LYMPH: no cervical, axillary or supraclavicular adenopathy  CVS: s1s2 RRR, no M/R/G  RESP: CTA b/l, no crackles, no wheezes or rhonchi  ABD: soft, NT, ND, BS+, no hepatosplenomegaly  EXT: no deformities, no pedal edema  SKIN: no rashes, warm and dry  NEURO: normal mentation, strength 5/5 on all 4 extremities, no sensory deficits    LABS   10/24/24  Hgb F 8.5, Hgb S 80.8   11/18/24  AST 19, ALT 6, , Ferritin 1383 , % sat 106, WBC 12 Hgb 7.6 .8 Platelet 431     01/03/2025  White blood cell count 79748 hemoglobin 7.9 platelets 410 AST 41 ALT 12 iron 84% sat 39 ferritin 1661  1. Sickle cell disease D57.1 C/w hydrea 1500mg daily, Hg F not at goal on most recent lab work, Hgb electrophoresis at 8.5  C/w folic acid 1mg daily  Adequate PO hydration  Analgesia with fentanyl and percocet 10mg as needed. C/w fentanyl  100mcg/hr,   Avoid overtransfusion. Only transfuse if symptomatic anemia. Do not transfuse for simple pain crisis  Of note, she did receive 3 days of Desferal and was transfused 1 unit PRBC during hospitalization at [facility] 11/2021.  Desferal x 10 days planned for 3/2022 but patient decided against Desferal, will continue to monitor ferritin closely and will Obtain hg electrophoresis, ferritin, iron, tibc, CBC w/ diff, CMP, folate, retic, UA for urine protein 1 week prior to return visit  Unknown prior vaccinations. She believes she last got PNA vaccinations ~5 years ago.  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           Plan:   01/07/2025  - Reinforced medication compliance, good oral hydration, diet, and rest   - continue folic acid 1 mg daily,  - continue hydroxyurea 2000 daily  - continueJadenu 1080 daily  - C/w antinausea meds  - Continue with Periactin for appetite  - Continue with pain medication for all pain crisis.    - Repeat labs on follow up including hemoglobin electrophoresis- RTC 8 WEEKS.      A total of  30 minutes were spent in review of records, interpretation of test, coordination of care, discussion and counseling with the patient.        Portions of the record may have been created with voice recognition software. Occasional wrong-word or sound-a-like substitutions may have occurred due to the inherent limitations of voice recognition software. Read the chart carefully and recognize, using context, where substitutions have occurred.

## 2025-01-16 DIAGNOSIS — D57.00 HB-SS DISEASE WITH CRISIS: ICD-10-CM

## 2025-01-16 DIAGNOSIS — D57.00 SICKLE CELL PAIN CRISIS: ICD-10-CM

## 2025-01-16 RX ORDER — FENTANYL 100 UG/H
PATCH TRANSDERMAL
Qty: 10 PATCH | Refills: 0 | OUTPATIENT
Start: 2025-01-16

## 2025-01-16 RX ORDER — OXYCODONE AND ACETAMINOPHEN 10; 325 MG/1; MG/1
1 TABLET ORAL EVERY 6 HOURS PRN
Qty: 120 TABLET | Refills: 0 | OUTPATIENT
Start: 2025-01-16 | End: 2025-02-15

## 2025-01-17 RX ORDER — FENTANYL 100 UG/H
PATCH TRANSDERMAL
Qty: 10 PATCH | Refills: 0 | Status: SHIPPED | OUTPATIENT
Start: 2025-01-17

## 2025-01-17 RX ORDER — OXYCODONE AND ACETAMINOPHEN 10; 325 MG/1; MG/1
1 TABLET ORAL EVERY 6 HOURS PRN
Qty: 120 TABLET | Refills: 0 | Status: SHIPPED | OUTPATIENT
Start: 2025-01-17 | End: 2025-02-16

## 2025-02-13 DIAGNOSIS — D57.00 HB-SS DISEASE WITH CRISIS: ICD-10-CM

## 2025-02-13 DIAGNOSIS — D57.00 SICKLE CELL PAIN CRISIS: ICD-10-CM

## 2025-02-13 RX ORDER — FENTANYL 100 UG/H
PATCH TRANSDERMAL
Qty: 10 PATCH | Refills: 0 | Status: SHIPPED | OUTPATIENT
Start: 2025-02-13

## 2025-02-13 RX ORDER — OXYCODONE AND ACETAMINOPHEN 10; 325 MG/1; MG/1
1 TABLET ORAL EVERY 6 HOURS PRN
Qty: 120 TABLET | Refills: 0 | Status: SHIPPED | OUTPATIENT
Start: 2025-02-13 | End: 2025-03-15

## 2025-02-24 DIAGNOSIS — E83.111 IRON OVERLOAD DUE TO REPEATED RED BLOOD CELL TRANSFUSIONS: ICD-10-CM

## 2025-02-24 DIAGNOSIS — D57.00 SICKLE CELL PAIN CRISIS: ICD-10-CM

## 2025-02-24 DIAGNOSIS — D57.00 HB-SS DISEASE WITH CRISIS: ICD-10-CM

## 2025-03-05 RX ORDER — DEFERASIROX 360 MG/1
3 TABLET, FILM COATED ORAL
Qty: 90 TABLET | Refills: 3 | Status: SHIPPED | OUTPATIENT
Start: 2025-03-05

## 2025-03-11 ENCOUNTER — OFFICE VISIT (OUTPATIENT)
Dept: HEMATOLOGY/ONCOLOGY | Facility: CLINIC | Age: 35
End: 2025-03-11
Payer: MEDICAID

## 2025-03-11 VITALS
DIASTOLIC BLOOD PRESSURE: 89 MMHG | TEMPERATURE: 99 F | WEIGHT: 164.38 LBS | HEIGHT: 65 IN | RESPIRATION RATE: 18 BRPM | SYSTOLIC BLOOD PRESSURE: 134 MMHG | BODY MASS INDEX: 27.39 KG/M2 | HEART RATE: 94 BPM | OXYGEN SATURATION: 100 %

## 2025-03-11 DIAGNOSIS — D57.00 HB-SS DISEASE WITH CRISIS: ICD-10-CM

## 2025-03-11 DIAGNOSIS — D57.00 SICKLE CELL PAIN CRISIS: ICD-10-CM

## 2025-03-11 DIAGNOSIS — D57.00 HEMOGLOBIN SS DISEASE WITH CRISIS: Primary | ICD-10-CM

## 2025-03-11 PROCEDURE — 99214 OFFICE O/P EST MOD 30 MIN: CPT | Mod: ,,, | Performed by: INTERNAL MEDICINE

## 2025-03-11 PROCEDURE — 1159F MED LIST DOCD IN RCRD: CPT | Mod: CPTII,,, | Performed by: INTERNAL MEDICINE

## 2025-03-11 PROCEDURE — 3075F SYST BP GE 130 - 139MM HG: CPT | Mod: CPTII,,, | Performed by: INTERNAL MEDICINE

## 2025-03-11 PROCEDURE — 3079F DIAST BP 80-89 MM HG: CPT | Mod: CPTII,,, | Performed by: INTERNAL MEDICINE

## 2025-03-11 PROCEDURE — 3008F BODY MASS INDEX DOCD: CPT | Mod: CPTII,,, | Performed by: INTERNAL MEDICINE

## 2025-03-11 RX ORDER — FENTANYL 100 UG/H
PATCH TRANSDERMAL
Qty: 10 PATCH | Refills: 0 | Status: SHIPPED | OUTPATIENT
Start: 2025-03-11

## 2025-03-11 RX ORDER — OXYCODONE AND ACETAMINOPHEN 10; 325 MG/1; MG/1
1 TABLET ORAL EVERY 6 HOURS PRN
Qty: 120 TABLET | Refills: 0 | Status: SHIPPED | OUTPATIENT
Start: 2025-03-11 | End: 2025-04-10

## 2025-03-11 NOTE — PROGRESS NOTES
Diagnosis:  - SCD     Treatment History:  - Folic acid, Hydrea, Jadenu     Current Treatment:   - hydrea 1500 mg daily  - Jadenu 1080 mg daily  - Folic acid 1 mg daily          Subjective:         HPI  Anya Leal  34 y.o.  female with past medical history significant for SCD here for follow-up  According to the patient she had been on folic acid, hydroxyurea and jadenu in the past.  Patient had not been taking her HU and Jadenu recently.  Patient had multiple transfusions over the year, requiring almost 10 transfusion last year.  Denies any history of exchange transfusion or acute chest syndrome.  Denies any history of avascular necrosis.  Denies any history of stroke. No ACS ever in her life never intubated Never full exchange transfusions >20 lifetime transfusions, in 2019 pt reports ~10 units of PRBC overall  No AVN  Never told she has iron overload and has never been on iron chelation prior to initial presentation  No strokes  Not on any pediatric transfusion regimen  >10 pain crisis per year (occur almost with every cycle requiring frequent hospital visits)  hospitalization 2/2021, had partial exchange of 1 unit  October 2023 reports pain crisis the last week of October, which involved a hospital admission for 2 days along with 2 units of PRBCs, IV pain medication, and IVFs.  May 11-20, 2024:  Sickle cell vaso-occlusive pain crisis with 2 units of blood, IVF, and pain medication.     Interval History:    03/11/2025: Patient here for follow up.  She continues on hydroxyurea and folic acid and tolerating well.  Her most recent labs from March 10th show MCV now 108.2 with hemoglobin of 8.7 and ferritin of 1059        01/07/2025: Patient continues to do well with folic acid and Hydrea.  Her labs show hemoglobin of 7.9 white blood cell count 54136  platelet count 410 with ferritin 1661 and iron 84 % sat 39. She is staying indoors to avoid cold       11/21/24  Here for follow up.  She continues  on folic acid Hydrea NJ new and tolerating well.  Unfortunately she has noted with cooler temperatures that she is having increased pain and weakness.  Her labs from October show hemoglobin F at 8.5 %           10/22/2024:  Ms. 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 She reports compliance with Folic acid, Hydrea, and Jadenu.   Refilled medication today and reinforce importance of compliance with medications in order to avoid crises. She reports pain. She denies, chills, headaches, dizziness, cough, SOB, abnormal bleeding, rash, nausea, weight loss, abdominal pain, flank pain, change in bowel or bladder habits, no lower extremity pain or swelling, no leg ulcers, and no neuropathy.  Reports eating when she feels not so fatigued.  Drinking greater than 6 bottles of water a day.  Weight is stable.  She reports up-to-date on all vaccinations. Will refer to cardiology today for recurrent syncopal episodes.         Past Medical History:   Diagnosis Date    Calculus of gallbladder without cholecystitis without obstruction     Encounter for blood transfusion     Flu 2019    sore throat    Sickle cell anemia        Past Surgical History:   Procedure Laterality Date     SECTION      GALLBLADDER SURGERY  2024    MEDIPORT INSERTION, SINGLE      TUBAL LIGATION         Family History   Problem Relation Name Age of Onset    Hypertension Mother Marybel        Social History     Socioeconomic History    Marital status:    Tobacco Use    Smoking status: Never    Smokeless tobacco: Never   Substance and Sexual Activity    Alcohol use: No    Drug use: No    Sexual activity: Not Currently     Partners: Male     Birth control/protection: I.U.D.       Current Medications[1]    Review of patient's allergies indicates:   Allergen Reactions    Aspirin     Butorphanol     Compazine [prochlorperazine edisylate]     Ketorolac      Other reaction(s): other    Morpholine analogues      Nubain [nalbuphine]     Stadol [butorphanol tartrate]     Tramadol Rash     hives         Review of systems  CONSTITUTIONAL: no fevers, no chills, no weight loss, no fatigue, no weakness  HEMATOLOGIC: no abnormal bleeding, no abnormal bruising, no drenching night sweats  ONCOLOGIC: no new masses or lumps  HEENT: no vision loss, no tinnitus or hearing loss, no nose bleeding, no dysphagia, no odynophagia  CVS: no chest pain, no palpitations, no dyspnea on exertion  RESP: no shortness of breath, no hemoptysis, no cough  GI: no nausea, no vomiting, no diarrhea, no constipation, no melena, no hematochezia, no hematemesis, no abdominal pain, no increase in abdominal girth  : no dysuria, no hematuria, no hesitancy, no scrotal swelling, no discharge  INTEGUMENT: no rashes, no abnormal bruising, no nail pitting, no hyperpigmentation  NEURO: no falls, no memory loss, no paresthesias or dysesthesias, no urofecal incontinence or retention, no loss of strength on any extremity  MSK: no back pain, no new joint pain, no joint swelling  PSYCH: no suicidal or homicidal ideation, no depression, no insomnia, no anhedonia  ENDOCRINE: no heat or cold intolerance, no polyuria, no polydipsia      Physical Exam:  Vitals:    03/11/25 1018   BP: 134/89   Pulse: 94   Resp: 18   Temp: 98.6 °F (37 °C)       ECOG PS 0  GA: AAOx3, NAD  HEENT: NCAT, PERRLA, EOMI, good dentition, moist oral mucous membranes  LYMPH: no cervical, axillary or supraclavicular adenopathy  CVS: s1s2 RRR, no M/R/G  RESP: CTA b/l, no crackles, no wheezes or rhonchi  ABD: soft, NT, ND, BS+, no hepatosplenomegaly  EXT: no deformities, no pedal edema  SKIN: no rashes, warm and dry  NEURO: normal mentation, strength 5/5 on all 4 extremities, no sensory deficits    LABS   10/24/24  Hgb F 8.5, Hgb S 80.8   11/18/24  AST 19, ALT 6, , Ferritin 1383 , % sat 106, WBC 12 Hgb 7.6 .8 Platelet 431      01/03/2025  White blood cell count 66540 hemoglobin 7.9 platelets  410 AST 41 ALT 12 iron 84% sat 39 ferritin 1661    03/10/2025  White blood cell count 6600 hemoglobin 8.7 platelets 313 % sat 85 ferritin 1059 iron 166    1. Sickle cell disease D57.1 C/w hydrea 1500mg daily, Hg F not at goal on most recent lab work, Hgb electrophoresis at 8.5  C/w folic acid 1mg daily  Adequate PO hydration  Analgesia with fentanyl and percocet 10mg as needed. C/w fentanyl 100mcg/hr,   Avoid overtransfusion. Only transfuse if symptomatic anemia. Do not transfuse for simple pain crisis  Of note, she did receive 3 days of Desferal and was transfused 1 unit PRBC during hospitalization at [facility] 11/2021.  Desferal x 10 days planned for 3/2022 but patient decided against Desferal, will continue to monitor ferritin closely and will Obtain hg electrophoresis, ferritin, iron, tibc, CBC w/ diff, CMP, folate, retic, UA for urine protein 1 week prior to return visit  Unknown prior vaccinations. She believes she last got PNA vaccinations ~5 years ago.  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           Plan:   3/7/2025  - Reinforced medication compliance, good oral hydration, diet, and rest   - continue folic acid 1 mg daily,  - continue hydroxyurea 2000 daily  - continueJadenu 1080 daily  - C/w antinausea meds  - Continue with Periactin for appetite  - Continue with pain medication for all pain crisis.    - Repeat labs on follow up including hemoglobin electrophoresis- RTC 8 WEEKS.      A total of  30 minutes were spent in review of records, interpretation of test, coordination of care, discussion and counseling with the patient.        Portions of the record may have been created with voice recognition software. Occasional wrong-word or sound-a-like substitutions may have occurred due to the inherent limitations of voice recognition software. Read the chart  carefully and recognize, using context, where substitutions have occurred.           [1]   Current Outpatient Medications   Medication Sig Dispense Refill    acyclovir (ZOVIRAX) 400 MG tablet Take 1 tablet (400 mg total) by mouth 2 (two) times daily. for 5 days 10 tablet 0    amLODIPine (NORVASC) 5 MG tablet  (Patient not taking: Reported on 7/29/2024)  0    cholecalciferol, vitamin D3, 1,250 mcg (50,000 unit) capsule Take 1 capsule (50,000 Units total) by mouth every 7 days. 20 capsule 1    cyproheptadine (PERIACTIN) 4 mg tablet Take 1 tablet (4 mg total) by mouth 3 (three) times daily as needed (appetite). 90 tablet 1    deferasirox (JADENU) 360 mg Tab TAKE 3 TABLETS BY MOUTH ONCE DAILY 90 tablet 3    fentaNYL (DURAGESIC) 100 mcg/hr REMOVE old PATCH AND apply ONE PATCH TO SKIN EVERY 72 HOURS 10 patch 0    fluticasone propionate (FLONASE) 50 mcg/actuation nasal spray 1 spray (50 mcg total) by Each Nostril route once daily. 9.9 mL 2    folic acid (FOLVITE) 1 MG tablet Take 1 tablet (1 mg total) by mouth once daily. 90 tablet 3    hydroxyurea (HYDREA) 500 mg Cap Take 3 capsules (1,500 mg total) by mouth once daily. 90 capsule 5    ibuprofen (ADVIL,MOTRIN) 800 MG tablet  (Patient not taking: Reported on 1/7/2025)  1    lisinopril (PRINIVIL,ZESTRIL) 20 MG tablet Take by mouth once daily.      ondansetron (ZOFRAN) 4 MG tablet Take 2 tablets (8 mg total) by mouth every 8 (eight) hours as needed for Nausea. (Patient not taking: Reported on 1/7/2025) 90 tablet 6    ondansetron (ZOFRAN-ODT) 4 MG TbDL Take 2 tablets (8 mg total) by mouth 2 (two) times daily. 30 tablet 3    oxyCODONE-acetaminophen (PERCOCET)  mg per tablet Take 1 tablet by mouth every 6 (six) hours as needed for Pain. 120 tablet 0    pantoprazole (PROTONIX) 40 MG tablet Take 1 tablet (40 mg total) by mouth once daily. 120 tablet 1    promethazine (PHENERGAN) 25 MG tablet Take 1 tablet (25 mg total) by mouth every 6 (six) hours as needed for Nausea. 90  tablet 5     No current facility-administered medications for this visit.

## 2025-03-31 DIAGNOSIS — E83.111 IRON OVERLOAD DUE TO REPEATED RED BLOOD CELL TRANSFUSIONS: ICD-10-CM

## 2025-03-31 DIAGNOSIS — D57.00 HB-SS DISEASE WITH CRISIS: ICD-10-CM

## 2025-03-31 DIAGNOSIS — D57.00 SICKLE CELL PAIN CRISIS: ICD-10-CM

## 2025-03-31 RX ORDER — HYDROXYUREA 500 MG/1
1500 CAPSULE ORAL
Qty: 90 CAPSULE | Refills: 5 | Status: SHIPPED | OUTPATIENT
Start: 2025-03-31

## 2025-04-10 DIAGNOSIS — D57.00 HB-SS DISEASE WITH CRISIS: ICD-10-CM

## 2025-04-10 DIAGNOSIS — D57.00 SICKLE CELL PAIN CRISIS: ICD-10-CM

## 2025-04-10 RX ORDER — FENTANYL 100 UG/H
PATCH TRANSDERMAL
Qty: 10 PATCH | Refills: 0 | Status: SHIPPED | OUTPATIENT
Start: 2025-04-10 | End: 2025-04-11 | Stop reason: SDUPTHER

## 2025-04-10 RX ORDER — OXYCODONE AND ACETAMINOPHEN 10; 325 MG/1; MG/1
1 TABLET ORAL EVERY 6 HOURS PRN
Qty: 120 TABLET | Refills: 0 | Status: SHIPPED | OUTPATIENT
Start: 2025-04-10 | End: 2025-04-11 | Stop reason: SDUPTHER

## 2025-04-11 DIAGNOSIS — D57.00 HB-SS DISEASE WITH CRISIS: ICD-10-CM

## 2025-04-11 DIAGNOSIS — D57.00 SICKLE CELL PAIN CRISIS: ICD-10-CM

## 2025-04-11 RX ORDER — FENTANYL 100 UG/H
PATCH TRANSDERMAL
Qty: 10 PATCH | Refills: 0 | Status: SHIPPED | OUTPATIENT
Start: 2025-04-11

## 2025-04-11 RX ORDER — OXYCODONE AND ACETAMINOPHEN 10; 325 MG/1; MG/1
1 TABLET ORAL EVERY 6 HOURS PRN
Qty: 120 TABLET | Refills: 0 | Status: SHIPPED | OUTPATIENT
Start: 2025-04-11 | End: 2025-05-11

## 2025-05-08 DIAGNOSIS — D57.00 HB-SS DISEASE WITH CRISIS: ICD-10-CM

## 2025-05-08 DIAGNOSIS — D57.00 SICKLE CELL PAIN CRISIS: ICD-10-CM

## 2025-05-08 RX ORDER — OXYCODONE AND ACETAMINOPHEN 10; 325 MG/1; MG/1
1 TABLET ORAL EVERY 6 HOURS PRN
Qty: 120 TABLET | Refills: 0 | Status: SHIPPED | OUTPATIENT
Start: 2025-05-08 | End: 2025-06-07

## 2025-05-08 RX ORDER — FENTANYL 100 UG/H
PATCH TRANSDERMAL
Qty: 10 PATCH | Refills: 0 | Status: SHIPPED | OUTPATIENT
Start: 2025-05-08

## 2025-05-12 ENCOUNTER — TELEPHONE (OUTPATIENT)
Dept: HEMATOLOGY/ONCOLOGY | Facility: CLINIC | Age: 35
End: 2025-05-12

## 2025-05-12 NOTE — TELEPHONE ENCOUNTER
----- Message from Tess sent at 5/12/2025  8:19 AM CDT -----  Patient called in this morning to r/s today's appt. She has been r/s for Friday, May 16th at 11.

## 2025-05-15 NOTE — PROGRESS NOTES
Diagnosis:  - SCD     Treatment History:  - Folic acid, Hydrea, Jadenu     Current Treatment:   - hydrea 1500 mg daily  - Jadenu 1080 mg daily  - Folic acid 1 mg daily          Subjective:         HPI  Anya Leal  34 y.o.  female with past medical history significant for SCD here for follow-up  According to the patient she had been on folic acid, hydroxyurea and jadenu in the past.  Patient had not been taking her HU and Jadenu recently.  Patient had multiple transfusions over the year, requiring almost 10 transfusion last year.  Denies any history of exchange transfusion or acute chest syndrome.  Denies any history of avascular necrosis.  Denies any history of stroke. No ACS ever in her life never intubated Never full exchange transfusions >20 lifetime transfusions, in 2019 pt reports ~10 units of PRBC overall  No AVN  Never told she has iron overload and has never been on iron chelation prior to initial presentation  No strokes  Not on any pediatric transfusion regimen  >10 pain crisis per year (occur almost with every cycle requiring frequent hospital visits)  hospitalization 2/2021, had partial exchange of 1 unit  October 2023 reports pain crisis the last week of October, which involved a hospital admission for 2 days along with 2 units of PRBCs, IV pain medication, and IVFs.  May 11-20, 2024:  Sickle cell vaso-occlusive pain crisis with 2 units of blood, IVF, and pain medication.     Interval History:   5/16/25: Patient here for follow up she continues to do well with hydroxyurea and folic acid.  Her biggest issue now is fatigue.  She does not have a primary doctor and has not had her yearly labs done.  She is managing her pain at home     03/11/2025: Patient here for follow up.  She continues on hydroxyurea and folic acid and tolerating well.  Her most recent labs from March 10th show MCV now 108.2 with hemoglobin of 8.7 and ferritin of 1059        01/07/2025: Patient continues to do well with  folic acid and Hydrea.  Her labs show hemoglobin of 7.9 white blood cell count 72609  platelet count 410 with ferritin 1661 and iron 84 % sat 39. She is staying indoors to avoid cold       24  Here for follow up.  She continues on folic acid Hydrea NJ new and tolerating well.  Unfortunately she has noted with cooler temperatures that she is having increased pain and weakness.  Her labs from October show hemoglobin F at 8.5 %           10/22/2024:  Ms. 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 She reports compliance with Folic acid, Hydrea, and Jadenu.   Refilled medication today and reinforce importance of compliance with medications in order to avoid crises. She reports pain. She denies, chills, headaches, dizziness, cough, SOB, abnormal bleeding, rash, nausea, weight loss, abdominal pain, flank pain, change in bowel or bladder habits, no lower extremity pain or swelling, no leg ulcers, and no neuropathy.  Reports eating when she feels not so fatigued.  Drinking greater than 6 bottles of water a day.  Weight is stable.  She reports up-to-date on all vaccinations. Will refer to cardiology today for recurrent syncopal episodes      Past Medical History:   Diagnosis Date    Calculus of gallbladder without cholecystitis without obstruction     Encounter for blood transfusion     Flu 2019    sore throat    Sickle cell anemia        Past Surgical History:   Procedure Laterality Date     SECTION      GALLBLADDER SURGERY  2024    MEDIPORT INSERTION, SINGLE      TUBAL LIGATION         Family History   Problem Relation Name Age of Onset    Hypertension Mother Marybel        Social History     Socioeconomic History    Marital status:    Tobacco Use    Smoking status: Never    Smokeless tobacco: Never   Substance and Sexual Activity    Alcohol use: No    Drug use: No    Sexual activity: Not Currently     Partners: Male     Birth control/protection:  I.U.D.       Current Medications[1]    Review of patient's allergies indicates:   Allergen Reactions    Aspirin     Butorphanol     Compazine [prochlorperazine edisylate]     Ketorolac      Other reaction(s): other    Morpholine analogues     Nubain [nalbuphine]     Stadol [butorphanol tartrate]     Tramadol Rash     hives         Review of systems  CONSTITUTIONAL: no fevers, no chills, no weight loss, no fatigue, no weakness  HEMATOLOGIC: no abnormal bleeding, no abnormal bruising, no drenching night sweats  ONCOLOGIC: no new masses or lumps  HEENT: no vision loss, no tinnitus or hearing loss, no nose bleeding, no dysphagia, no odynophagia  CVS: no chest pain, no palpitations, no dyspnea on exertion  RESP: no shortness of breath, no hemoptysis, no cough  GI: no nausea, no vomiting, no diarrhea, no constipation, no melena, no hematochezia, no hematemesis, no abdominal pain, no increase in abdominal girth  : no dysuria, no hematuria, no hesitancy, no scrotal swelling, no discharge  INTEGUMENT: no rashes, no abnormal bruising, no nail pitting, no hyperpigmentation  NEURO: no falls, no memory loss, no paresthesias or dysesthesias, no urofecal incontinence or retention, no loss of strength on any extremity  MSK: no back pain, no new joint pain, no joint swelling  PSYCH: no suicidal or homicidal ideation, no depression, no insomnia, no anhedonia  ENDOCRINE: no heat or cold intolerance, no polyuria, no polydipsia      Physical Exam:  Vitals:    05/16/25 1028   BP: 111/79   Pulse: 110   Resp: 16   Temp: 98.4 °F (36.9 °C)       ECOG PS 0  GA: AAOx3, NAD  HEENT: NCAT, PERRLA, EOMI, good dentition, moist oral mucous membranes  LYMPH: no cervical, axillary or supraclavicular adenopathy  CVS: s1s2 RRR, no M/R/G  RESP: CTA b/l, no crackles, no wheezes or rhonchi  ABD: soft, NT, ND, BS+, no hepatosplenomegaly  EXT: no deformities, no pedal edema  SKIN: no rashes, warm and dry  NEURO: normal mentation, strength 5/5 on all 4  extremities, no sensory deficits    LABS   10/24/24  Hgb F 8.5, Hgb S 80.8   11/18/24  AST 19, ALT 6, , Ferritin 1383 , % sat 106, WBC 12 Hgb 7.6 .8 Platelet 431      01/03/2025  White blood cell count 09263 hemoglobin 7.9 platelets 410 AST 41 ALT 12 iron 84% sat 39 ferritin 1661     03/10/2025  White blood cell count 6600 hemoglobin 8.7 platelets 313 % sat 85 ferritin 1059 iron 166 /11/25 white blood cell count 6400 hemoglobin 8.8  platelets 177 % sat 87 ferritin 655 iron 155 AST 17 ALT less than 7 creatinine 0.84     1. Sickle cell disease D57.1 C/w hydrea 1500mg daily, Hg F not at goal on most recent lab work, Hgb electrophoresis at 8.5  C/w folic acid 1mg daily  Adequate PO hydration  Analgesia with fentanyl and percocet 10mg as needed. C/w fentanyl 100mcg/hr,   Avoid overtransfusion. Only transfuse if symptomatic anemia. Do not transfuse for simple pain crisis  Of note, she did receive 3 days of Desferal and was transfused 1 unit PRBC during hospitalization at [facility] 11/2021.  Desferal x 10 days planned for 3/2022 but patient decided against Desferal, will continue to monitor ferritin closely and will Obtain hg electrophoresis, ferritin, iron, tibc, CBC w/ diff, CMP, folate, retic, UA for urine protein 1 week prior to return visit  Unknown prior vaccinations. She believes she last got PNA vaccinations ~5 years ago.  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           Plan:   5/16/25  - Reinforced medication compliance, good oral hydration, diet, and rest   - continue folic acid 1 mg daily,  - continue hydroxyurea 2000 daily  - continueJadenu 1080 daily  - C/w antinausea meds  - check TSH T3-T4 levels at next visit  - Continue with Periactin for appetite  - Continue with pain medication for all pain crisis.    - Repeat labs on follow up including  hemoglobin electrophoresis- RTC 8 WEEKS.        A total of  30 minutes were spent in review of records, interpretation of test, coordination of care, discussion and counseling with the patient.        Portions of the record may have been created with voice recognition software. Occasional wrong-word or sound-a-like substitutions may have occurred due to the inherent limitations of voice recognition software. Read the chart carefully and recognize, using context, where substitutions have occurred.           [1]   Current Outpatient Medications   Medication Sig Dispense Refill    deferasirox (JADENU) 360 mg Tab TAKE 3 TABLETS BY MOUTH ONCE DAILY 90 tablet 3    fentaNYL (DURAGESIC) 100 mcg/hr REMOVE old PATCH AND apply ONE PATCH TO SKIN EVERY 72 HOURS 10 patch 0    folic acid (FOLVITE) 1 MG tablet Take 1 tablet (1 mg total) by mouth once daily. 90 tablet 3    hydroxyurea (HYDREA) 500 mg Cap TAKE THREE CAPSULES BY MOUTH ONCE DAILY 90 capsule 5    ondansetron (ZOFRAN) 4 MG tablet Take 2 tablets (8 mg total) by mouth every 8 (eight) hours as needed for Nausea. 90 tablet 6    oxyCODONE-acetaminophen (PERCOCET)  mg per tablet Take 1 tablet by mouth every 6 (six) hours as needed for Pain. 120 tablet 0    promethazine (PHENERGAN) 25 MG tablet Take 1 tablet (25 mg total) by mouth every 6 (six) hours as needed for Nausea. 90 tablet 5     No current facility-administered medications for this visit.

## 2025-05-16 ENCOUNTER — OFFICE VISIT (OUTPATIENT)
Dept: HEMATOLOGY/ONCOLOGY | Facility: CLINIC | Age: 35
End: 2025-05-16
Payer: MEDICAID

## 2025-05-16 VITALS
WEIGHT: 165.31 LBS | SYSTOLIC BLOOD PRESSURE: 111 MMHG | BODY MASS INDEX: 27.54 KG/M2 | RESPIRATION RATE: 16 BRPM | OXYGEN SATURATION: 100 % | HEART RATE: 110 BPM | HEIGHT: 65 IN | DIASTOLIC BLOOD PRESSURE: 79 MMHG | TEMPERATURE: 98 F

## 2025-05-16 DIAGNOSIS — D57.00 HB-SS DISEASE WITH CRISIS: Primary | ICD-10-CM

## 2025-06-05 DIAGNOSIS — D57.00 HB-SS DISEASE WITH CRISIS: ICD-10-CM

## 2025-06-05 DIAGNOSIS — D57.00 SICKLE CELL PAIN CRISIS: ICD-10-CM

## 2025-06-05 RX ORDER — FENTANYL 100 UG/H
PATCH TRANSDERMAL
Qty: 10 PATCH | Refills: 0 | Status: SHIPPED | OUTPATIENT
Start: 2025-06-05

## 2025-06-05 RX ORDER — OXYCODONE AND ACETAMINOPHEN 10; 325 MG/1; MG/1
1 TABLET ORAL EVERY 6 HOURS PRN
Qty: 120 TABLET | Refills: 0 | Status: SHIPPED | OUTPATIENT
Start: 2025-06-05 | End: 2025-07-05

## 2025-07-03 DIAGNOSIS — D57.00 HB-SS DISEASE WITH CRISIS: ICD-10-CM

## 2025-07-03 DIAGNOSIS — D57.00 SICKLE CELL PAIN CRISIS: ICD-10-CM

## 2025-07-03 RX ORDER — OXYCODONE AND ACETAMINOPHEN 10; 325 MG/1; MG/1
1 TABLET ORAL EVERY 6 HOURS PRN
Qty: 120 TABLET | Refills: 0 | Status: SHIPPED | OUTPATIENT
Start: 2025-07-03 | End: 2025-08-02

## 2025-07-03 RX ORDER — FENTANYL 100 UG/H
PATCH TRANSDERMAL
Qty: 10 PATCH | Refills: 0 | Status: SHIPPED | OUTPATIENT
Start: 2025-07-03

## 2025-07-17 ENCOUNTER — TELEPHONE (OUTPATIENT)
Dept: HEMATOLOGY/ONCOLOGY | Facility: CLINIC | Age: 35
End: 2025-07-17
Payer: MEDICAID

## 2025-07-17 NOTE — TELEPHONE ENCOUNTER
Called patient to confirm appointment for 7/21/25. Spoke with patient. Patient confirmed appointment.

## 2025-07-21 ENCOUNTER — OFFICE VISIT (OUTPATIENT)
Dept: HEMATOLOGY/ONCOLOGY | Facility: CLINIC | Age: 35
End: 2025-07-21
Payer: MEDICAID

## 2025-07-21 VITALS
RESPIRATION RATE: 14 BRPM | TEMPERATURE: 98 F | BODY MASS INDEX: 27.19 KG/M2 | HEART RATE: 126 BPM | OXYGEN SATURATION: 98 % | DIASTOLIC BLOOD PRESSURE: 80 MMHG | HEIGHT: 65 IN | WEIGHT: 163.19 LBS | SYSTOLIC BLOOD PRESSURE: 118 MMHG

## 2025-07-21 DIAGNOSIS — D57.00 HB-SS DISEASE WITH CRISIS: Primary | ICD-10-CM

## 2025-07-21 PROCEDURE — 99214 OFFICE O/P EST MOD 30 MIN: CPT | Mod: ,,, | Performed by: INTERNAL MEDICINE

## 2025-07-21 PROCEDURE — 3079F DIAST BP 80-89 MM HG: CPT | Mod: CPTII,,, | Performed by: INTERNAL MEDICINE

## 2025-07-21 PROCEDURE — 3074F SYST BP LT 130 MM HG: CPT | Mod: CPTII,,, | Performed by: INTERNAL MEDICINE

## 2025-07-21 PROCEDURE — 3008F BODY MASS INDEX DOCD: CPT | Mod: CPTII,,, | Performed by: INTERNAL MEDICINE

## 2025-07-21 RX ORDER — OFLOXACIN 3 MG/ML
10 SOLUTION AURICULAR (OTIC) DAILY
COMMUNITY
Start: 2025-07-15

## 2025-07-21 RX ORDER — ACYCLOVIR 400 MG/1
400 TABLET ORAL 2 TIMES DAILY
COMMUNITY

## 2025-07-21 RX ORDER — AMOXICILLIN AND CLAVULANATE POTASSIUM 875; 125 MG/1; MG/1
1 TABLET, FILM COATED ORAL EVERY 12 HOURS
COMMUNITY
Start: 2025-07-15

## 2025-07-21 NOTE — PROGRESS NOTES
Diagnosis:  - SCD     Treatment History:  - Folic acid, Hydrea, Jadenu     Current Treatment:   - hydrea 1500 mg daily  - Jadenu 1080 mg daily  - Folic acid 1 mg daily          Subjective:         HPI  Anya Leal  34 y.o.  female with past medical history significant for SCD here for follow-up  According to the patient she had been on folic acid, hydroxyurea and jadenu in the past.  Patient had not been taking her HU and Jadenu recently.  Patient had multiple transfusions over the year, requiring almost 10 transfusion last year.  Denies any history of exchange transfusion or acute chest syndrome.  Denies any history of avascular necrosis.  Denies any history of stroke. No ACS ever in her life never intubated Never full exchange transfusions >20 lifetime transfusions, in 2019 pt reports ~10 units of PRBC overall  No AVN  Never told she has iron overload and has never been on iron chelation prior to initial presentation  No strokes  Not on any pediatric transfusion regimen  >10 pain crisis per year (occur almost with every cycle requiring frequent hospital visits)  hospitalization 2/2021, had partial exchange of 1 unit  October 2023 reports pain crisis the last week of October, which involved a hospital admission for 2 days along with 2 units of PRBCs, IV pain medication, and IVFs.  May 11-20, 2024:  Sickle cell vaso-occlusive pain crisis with 2 units of blood, IVF, and pain medication.     Interval History:   07/21/2025: Patient here for follow up.  She is doing well at this time with better pain control.  She had recent ear infection and is on antibiotics.  She states she is not sleeping well      5/16/25: Patient here for follow up she continues to do well with hydroxyurea and folic acid.  Her biggest issue now is fatigue.  She does not have a primary doctor and has not had her yearly labs done.  She is managing her pain at home      03/11/2025: Patient here for follow up.  She continues on  hydroxyurea and folic acid and tolerating well.  Her most recent labs from  show MCV now 108.2 with hemoglobin of 8.7 and ferritin of 1059        2025: Patient continues to do well with folic acid and Hydrea.  Her labs show hemoglobin of 7.9 white blood cell count 79323  platelet count 410 with ferritin 1661 and iron 84 % sat 39. She is staying indoors to avoid cold       24  Here for follow up.  She continues on folic acid Hydrea [place] new and tolerating well.  Unfortunately she has noted with cooler temperatures that she is having increased pain and weakness.  Her labs from October show hemoglobin F at 8.5 %           10/22/2024:  Ms. 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 She reports compliance with Folic acid, Hydrea, and Jadenu.   Refilled medication today and reinforce importance of compliance with medications in order to avoid crises. She reports pain. She denies, chills, headaches, dizziness, cough, SOB, abnormal bleeding, rash, nausea, weight loss, abdominal pain, flank pain, change in bowel or bladder habits, no lower extremity pain or swelling, no leg ulcers, and no neuropathy.  Reports eating when she feels not so fatigued.  Drinking greater than 6 bottles of water a day.  Weight is stable.  She reports up-to-date on all vaccinations. Will refer to cardiology today for recurrent syncopal episodes      Past Medical History:   Diagnosis Date    Calculus of gallbladder without cholecystitis without obstruction     Encounter for blood transfusion     Flu 2019    sore throat    Sickle cell anemia        Past Surgical History:   Procedure Laterality Date     SECTION      GALLBLADDER SURGERY  2024    MEDIPORT INSERTION, SINGLE      TUBAL LIGATION         Family History   Problem Relation Name Age of Onset    Hypertension Mother Marybel        Social History     Socioeconomic History    Marital status:    Tobacco Use     Smoking status: Never    Smokeless tobacco: Never   Substance and Sexual Activity    Alcohol use: No    Drug use: No    Sexual activity: Not Currently     Partners: Male     Birth control/protection: I.U.D.       Current Medications[1]    Review of patient's allergies indicates:   Allergen Reactions    Aspirin     Butorphanol     Compazine [prochlorperazine edisylate]     Ketorolac      Other reaction(s): other    Morpholine analogues     Nubain [nalbuphine]     Stadol [butorphanol tartrate]     Tramadol Rash     hives         Review of systems  CONSTITUTIONAL: no fevers, no chills, no weight loss, no fatigue, no weakness  HEMATOLOGIC: no abnormal bleeding, no abnormal bruising, no drenching night sweats  ONCOLOGIC: no new masses or lumps  HEENT: no vision loss, no tinnitus or hearing loss, no nose bleeding, no dysphagia, no odynophagia  CVS: no chest pain, no palpitations, no dyspnea on exertion  RESP: no shortness of breath, no hemoptysis, no cough  GI: no nausea, no vomiting, no diarrhea, no constipation, no melena, no hematochezia, no hematemesis, no abdominal pain, no increase in abdominal girth  : no dysuria, no hematuria, no hesitancy, no scrotal swelling, no discharge  INTEGUMENT: no rashes, no abnormal bruising, no nail pitting, no hyperpigmentation  NEURO: no falls, no memory loss, no paresthesias or dysesthesias, no urofecal incontinence or retention, no loss of strength on any extremity  MSK: no back pain, no new joint pain, no joint swelling  PSYCH: no suicidal or homicidal ideation, no depression, no insomnia, no anhedonia  ENDOCRINE: no heat or cold intolerance, no polyuria, no polydipsia      Physical Exam:  Vitals:    07/21/25 1501   BP: 118/80   Pulse: (!) 126   Resp: 14   Temp: 97.8 °F (36.6 °C)       ECOG PS 0  GA: AAOx3, NAD  HEENT: NCAT, PERRLA, EOMI, good dentition, moist oral mucous membranes  LYMPH: no cervical, axillary or supraclavicular adenopathy  CVS: s1s2 RRR, no M/R/G  RESP: CTA  b/l, no crackles, no wheezes or rhonchi  ABD: soft, NT, ND, BS+, no hepatosplenomegaly  EXT: no deformities, no pedal edema  SKIN: no rashes, warm and dry  NEURO: normal mentation, strength 5/5 on all 4 extremities, no sensory deficits    LABS   7/15/25  Iron 146% sat 69 ferritin 662 white blood cell count 4620 hemoglobin 9.3 platelets 385 hemoglobin F21.3 hemoglobin S76.5   10/24/24  Hgb F 8.5, Hgb S 80.8   11/18/24  AST 19, ALT 6, , Ferritin 1383 , % sat 106, WBC 12 Hgb 7.6 .8 Platelet 431      01/03/2025  White blood cell count 32096 hemoglobin 7.9 platelets 410 AST 41 ALT 12 iron 84% sat 39 ferritin 1661     03/10/2025  White blood cell count 6600 hemoglobin 8.7 platelets 313 % sat 85 ferritin 1059 iron 166 /11/25 white blood cell count 6400 hemoglobin 8.8  platelets 177 % sat 87 ferritin 655 iron 155 AST 17 ALT less than 7 creatinine 0.84     1. Sickle cell disease D57.1 C/w hydrea 1500mg daily, Hg F not at goal on most recent lab work, Hgb electrophoresis at 8.5  C/w folic acid 1mg daily  Adequate PO hydration  Analgesia with fentanyl and percocet 10mg as needed. C/w fentanyl 100mcg/hr,   Avoid overtransfusion. Only transfuse if symptomatic anemia. Do not transfuse for simple pain crisis  Of note, she did receive 3 days of Desferal and was transfused 1 unit PRBC during hospitalization at [facility] 11/2021.  Desferal x 10 days planned for 3/2022 but patient decided against Desferal, will continue to monitor ferritin closely and will Obtain hg electrophoresis, ferritin, iron, tibc, CBC w/ diff, CMP, folate, retic, UA for urine protein 1 week prior to return visit  Unknown prior vaccinations. She believes she last got PNA vaccinations ~5 years ago.  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           Plan:   7/21/25    - continue folic acid 1 mg  daily,  - continue hydroxyurea 2000 daily  - continueJadenu 1080 daily  - C/w antinausea meds  - Continue with Periactin for appetite  - Continue with pain medication for all pain crisis.    - Repeat labs on follow up including hemoglobin electrophoresis- RTC 8 WEEKS.      A total of  30 minutes were spent in review of records, interpretation of test, coordination of care, discussion and counseling with the patient.        Portions of the record may have been created with voice recognition software. Occasional wrong-word or sound-a-like substitutions may have occurred due to the inherent limitations of voice recognition software. Read the chart carefully and recognize, using context, where substitutions have occurred.           [1]   Current Outpatient Medications   Medication Sig Dispense Refill    deferasirox (JADENU) 360 mg Tab TAKE 3 TABLETS BY MOUTH ONCE DAILY 90 tablet 3    fentaNYL (DURAGESIC) 100 mcg/hr REMOVE old PATCH AND apply ONE PATCH TO SKIN EVERY 72 HOURS 10 patch 0    folic acid (FOLVITE) 1 MG tablet Take 1 tablet (1 mg total) by mouth once daily. 90 tablet 3    hydroxyurea (HYDREA) 500 mg Cap TAKE THREE CAPSULES BY MOUTH ONCE DAILY 90 capsule 5    ondansetron (ZOFRAN) 4 MG tablet Take 2 tablets (8 mg total) by mouth every 8 (eight) hours as needed for Nausea. 90 tablet 6    oxyCODONE-acetaminophen (PERCOCET)  mg per tablet Take 1 tablet by mouth every 6 (six) hours as needed for Pain. 120 tablet 0    promethazine (PHENERGAN) 25 MG tablet Take 1 tablet (25 mg total) by mouth every 6 (six) hours as needed for Nausea. 90 tablet 5     No current facility-administered medications for this visit.

## 2025-07-31 DIAGNOSIS — D57.00 SICKLE CELL PAIN CRISIS: ICD-10-CM

## 2025-07-31 DIAGNOSIS — D57.00 HB-SS DISEASE WITH CRISIS: ICD-10-CM

## 2025-08-01 RX ORDER — FENTANYL 100 UG/H
PATCH TRANSDERMAL
Qty: 10 PATCH | Refills: 0 | Status: SHIPPED | OUTPATIENT
Start: 2025-08-01

## 2025-08-01 RX ORDER — OXYCODONE AND ACETAMINOPHEN 10; 325 MG/1; MG/1
1 TABLET ORAL EVERY 6 HOURS PRN
Qty: 120 TABLET | Refills: 0 | Status: SHIPPED | OUTPATIENT
Start: 2025-08-01 | End: 2025-08-31

## 2025-08-28 DIAGNOSIS — D57.00 SICKLE CELL PAIN CRISIS: ICD-10-CM

## 2025-08-28 DIAGNOSIS — D57.00 HB-SS DISEASE WITH CRISIS: ICD-10-CM

## 2025-08-28 RX ORDER — OXYCODONE AND ACETAMINOPHEN 10; 325 MG/1; MG/1
1 TABLET ORAL EVERY 6 HOURS PRN
Qty: 120 TABLET | Refills: 0 | Status: SHIPPED | OUTPATIENT
Start: 2025-08-29 | End: 2025-09-28

## 2025-08-28 RX ORDER — FENTANYL 100 UG/H
PATCH TRANSDERMAL
Qty: 10 PATCH | Refills: 0 | Status: SHIPPED | OUTPATIENT
Start: 2025-08-28